# Patient Record
Sex: MALE | Race: WHITE | NOT HISPANIC OR LATINO | Employment: OTHER | URBAN - NONMETROPOLITAN AREA
[De-identification: names, ages, dates, MRNs, and addresses within clinical notes are randomized per-mention and may not be internally consistent; named-entity substitution may affect disease eponyms.]

---

## 2017-04-11 ENCOUNTER — OFFICE VISIT (OUTPATIENT)
Dept: UROLOGY | Facility: CLINIC | Age: 62
End: 2017-04-11

## 2017-04-11 VITALS
DIASTOLIC BLOOD PRESSURE: 74 MMHG | SYSTOLIC BLOOD PRESSURE: 110 MMHG | HEIGHT: 71 IN | BODY MASS INDEX: 34.69 KG/M2 | TEMPERATURE: 97.6 F | WEIGHT: 247.8 LBS

## 2017-04-11 DIAGNOSIS — N13.8 BPH (BENIGN PROSTATIC HYPERTROPHY) WITH URINARY OBSTRUCTION: ICD-10-CM

## 2017-04-11 DIAGNOSIS — N40.1 BPH (BENIGN PROSTATIC HYPERTROPHY) WITH URINARY OBSTRUCTION: ICD-10-CM

## 2017-04-11 DIAGNOSIS — E29.1 HYPOGONADISM, MALE: Primary | ICD-10-CM

## 2017-04-11 DIAGNOSIS — N52.9 ERECTILE DYSFUNCTION, UNSPECIFIED ERECTILE DYSFUNCTION TYPE: ICD-10-CM

## 2017-04-11 DIAGNOSIS — N52.9 IMPOTENCE DUE TO ERECTILE DYSFUNCTION: ICD-10-CM

## 2017-04-11 LAB
BILIRUB BLD-MCNC: NEGATIVE MG/DL
CLARITY, POC: CLEAR
COLOR UR: YELLOW
GLUCOSE UR STRIP-MCNC: NEGATIVE MG/DL
KETONES UR QL: NEGATIVE
LEUKOCYTE EST, POC: NEGATIVE
NITRITE UR-MCNC: NEGATIVE MG/ML
PH UR: 6.5 [PH] (ref 5–8)
PROT UR STRIP-MCNC: ABNORMAL MG/DL
RBC # UR STRIP: ABNORMAL /UL
SP GR UR: 1.03 (ref 1–1.03)
UROBILINOGEN UR QL: NORMAL

## 2017-04-11 PROCEDURE — 81003 URINALYSIS AUTO W/O SCOPE: CPT | Performed by: UROLOGY

## 2017-04-11 PROCEDURE — 99214 OFFICE O/P EST MOD 30 MIN: CPT | Performed by: UROLOGY

## 2017-04-11 RX ORDER — VARDENAFIL HYDROCHLORIDE 20 MG/1
20 TABLET ORAL DAILY PRN
Qty: 6 TABLET | Refills: 5 | Status: SHIPPED | OUTPATIENT
Start: 2017-04-11 | End: 2017-12-16 | Stop reason: SDUPTHER

## 2017-04-11 NOTE — PROGRESS NOTES
Subjective    Mr. Rees is 61 y.o. male    CHIEF COMPLAINT: Hypogonadism    The patient is doing reasonably well from a hypogonadism point of view has recent testosterone is right in the middle 668 he is using Axiron one swipe under each arm daily.    His hemoglobin and hematocrit although high normal are stable he is still donating blood as often as he is allowed to        We also following him for BPH clinically he is doing well his AUA score today is moderate he denies any gross hematuria there is no flank pain pain he does have little bit urgency frequency known infections etc. since we have seen him his PSA is normal in the 1.35 range he still takes tamsulosin he said he tried to stop it but he noticed immediately the results and so he is continuing to take    We are also following him for ED and Levitra seems to working well and went ahead and refill that for him today      History of Present Illness      The following portions of the patient's history were reviewed and updated as appropriate: allergies, current medications, past family history, past medical history, past social history, past surgical history and problem list.    Review of Systems   Constitutional: Negative.  Negative for chills and fever.   Gastrointestinal: Negative for abdominal distention, abdominal pain, anal bleeding, blood in stool and nausea.   Genitourinary: Positive for frequency. Negative for difficulty urinating, dysuria, flank pain, hematuria and urgency.   Psychiatric/Behavioral: Negative.  Negative for agitation and confusion.         Current Outpatient Prescriptions:   •  aspirin 81 MG EC tablet, Take 81 mg by mouth Daily., Disp: , Rfl:   •  AXIRON 30 MG/ACT solution, USE 1 PUMP TO EACH ARM DAILY, Disp: , Rfl: 5  •  CHANTIX CONTINUING MONTH HETAL 1 MG tablet, TAKE 1 TABLET BY MOUTH TWICE A DAY, Disp: , Rfl: 2  •  lisinopril-hydrochlorothiazide (PRINZIDE,ZESTORETIC) 20-12.5 MG per tablet, TAKE 1 TABLET BY MOUTH EVERY DAY, Disp: ,  "Rfl: 5  •  tamsulosin (FLOMAX) 0.4 MG capsule 24 hr capsule, TAKE ONE CAPSULE BY MOUTH EVERY DAY SHORTLY AFTER THE SAME MEAL, Disp: , Rfl: 3  •  vardenafil (LEVITRA) 20 MG tablet, Take 1 tablet by mouth Daily As Needed for erectile dysfunction., Disp: 6 tablet, Rfl: 5    Past Medical History:   Diagnosis Date   • Hypertension    • Nephrolithiasis        Past Surgical History:   Procedure Laterality Date   • BELOW KNEE LEG AMPUTATION     • LYMPH NODE BIOPSY         Social History     Social History   • Marital status:      Spouse name: N/A   • Number of children: N/A   • Years of education: N/A     Social History Main Topics   • Smoking status: Former Smoker   • Smokeless tobacco: None   • Alcohol use No   • Drug use: None   • Sexual activity: Not Asked     Other Topics Concern   • None     Social History Narrative       Family History   Problem Relation Age of Onset   • Kidney cancer Father    • No Known Problems Mother        Objective    /74  Temp 97.6 °F (36.4 °C)  Ht 71\" (180.3 cm)  Wt 247 lb 12.8 oz (112 kg)  BMI 34.56 kg/m2    Physical Exam      Results Encounter on 12/11/2016   Component Date Value Ref Range Status   • WBC 04/04/2017 5.65  4.80 - 10.80 10*3/mm3 Final   • RBC 04/04/2017 6.60* 4.80 - 5.90 10*6/mm3 Final   • Hemoglobin 04/04/2017 16.1  14.0 - 18.0 g/dL Final   • Hematocrit 04/04/2017 51.1  40.0 - 52.0 % Final   • MCV 04/04/2017 77.4* 82.0 - 95.0 fL Final   • MCH 04/04/2017 24.4* 28.0 - 32.0 pg Final   • MCHC 04/04/2017 31.5* 33.0 - 36.0 g/dL Final   • RDW 04/04/2017 15.1* 12.0 - 15.0 % Final   • Platelets 04/04/2017 297  130 - 400 10*3/mm3 Final   • Neutrophil Rel % 04/04/2017 49.7  39.0 - 78.0 % Final   • Lymphocyte Rel % 04/04/2017 35.6  15.0 - 45.0 % Final   • Monocyte Rel % 04/04/2017 11.9  4.0 - 12.0 % Final   • Eosinophil Rel % 04/04/2017 1.9  0.0 - 4.0 % Final   • Basophil Rel % 04/04/2017 0.7  0.0 - 2.0 % Final   • Neutrophils Absolute 04/04/2017 2.81  1.87 - 8.40 " 10*3/mm3 Final   • Lymphocytes Absolute 04/04/2017 2.01  0.72 - 4.86 10*3/mm3 Final   • Monocytes Absolute 04/04/2017 0.67  0.19 - 1.30 10*3/mm3 Final   • Eosinophils Absolute 04/04/2017 0.11  0.00 - 0.70 10*3/mm3 Final   • Basophils Absolute 04/04/2017 0.04  0.00 - 0.20 10*3/mm3 Final   • Immature Granulocyte Rel % 04/04/2017 0.2  0.0 - 5.0 % Final   • Immature Grans Absolute 04/04/2017 0.01  0.00 - 0.03 10*3/mm3 Final   • nRBC 04/04/2017 0.0  0.0 - 0.0 /100 WBC Final   • Glucose 04/04/2017 92  70 - 100 mg/dL Final   • BUN 04/04/2017 21  5 - 21 mg/dL Final   • Creatinine 04/04/2017 0.98  0.50 - 1.40 mg/dL Final   • eGFR Non African Am 04/04/2017 78  >60 mL/min/1.73 Final   • eGFR African Am 04/04/2017 95  >60 mL/min/1.73 Final   • BUN/Creatinine Ratio 04/04/2017 21.4  7.0 - 25.0 Final   • Sodium 04/04/2017 138  135 - 145 mmol/L Final   • Potassium 04/04/2017 4.7  3.5 - 5.3 mmol/L Final   • Chloride 04/04/2017 100  98 - 110 mmol/L Final   • Total CO2 04/04/2017 27.0  24.0 - 31.0 mmol/L Final   • Calcium 04/04/2017 9.2  8.4 - 10.4 mg/dL Final   • Total Protein 04/04/2017 7.2  6.3 - 8.7 g/dL Final   • Albumin 04/04/2017 4.20  3.50 - 5.00 g/dL Final   • Globulin 04/04/2017 3.0  gm/dL Final   • A/G Ratio 04/04/2017 1.4  1.1 - 2.5 g/dL Final   • Total Bilirubin 04/04/2017 0.5  0.1 - 1.0 mg/dL Final   • Alkaline Phosphatase 04/04/2017 48  24 - 120 U/L Final   • AST (SGOT) 04/04/2017 25  7 - 45 U/L Final   • ALT (SGPT) 04/04/2017 33  0 - 54 U/L Final   • PSA 04/04/2017 1.350  0.000 - 4.000 ng/mL Final   • Testosterone, Total 04/04/2017 668  348 - 1197 ng/dL Final   • Comment 04/04/2017 Comment   Final    Comment: Adult male reference interval is based on a population of lean males  up to 40 years old.         Results for orders placed or performed in visit on 04/11/17   POC Urinalysis Dipstick, Automated   Result Value Ref Range    Color Yellow Yellow, Straw, Dark Yellow, Linda    Clarity, UA Clear Clear    Glucose, UA  Negative Negative, 1000 mg/dL (3+) mg/dL    Bilirubin Negative Negative    Ketones, UA Negative Negative    Specific Gravity  1.030 1.005 - 1.030    Blood, UA Trace (A) Negative    pH, Urine 6.5 5.0 - 8.0    Protein, POC Trace (A) Negative mg/dL    Urobilinogen, UA Normal Normal    Leukocytes Negative Negative    Nitrite, UA Negative Negative       Assessment and Plan    Roc was seen today for hypogonadism.    Diagnoses and all orders for this visit:    Hypogonadism, male  -     POC Urinalysis Dipstick, Automated  -     CBC & Differential; Future  -     Comprehensive Metabolic Panel; Future  -     Testosterone; Future    BPH (benign prostatic hypertrophy) with urinary obstruction    Impotence due to erectile dysfunction    Erectile dysfunction, unspecified erectile dysfunction type  -     vardenafil (LEVITRA) 20 MG tablet; Take 1 tablet by mouth Daily As Needed for erectile dysfunction.   plan--from a hypogonadism point review he seems to be stable again we did discussed the elevated borderline high hemoglobin and hematocrit he does take aspirin daily and so he will continue giving units of blood as he has been.    I refilled his Axiron formed by written prescription today as well.    Also refilled his Levitra all seen back in 6 months repeat blood studies call sooner as needed    EMR Dragon/Transcription disclaimer:  Much of this encounter note is an electronic transcription/translation of spoken language to printed text. The electronic translation of spoken language may permit erroneous, or at times, nonsensical words or phrases to be inadvertently transcribed; although I have reviewed the note for such errors, some may still exist.

## 2017-04-16 ENCOUNTER — RESULTS ENCOUNTER (OUTPATIENT)
Dept: UROLOGY | Facility: CLINIC | Age: 62
End: 2017-04-16

## 2017-04-16 DIAGNOSIS — E29.1 HYPOGONADISM, MALE: ICD-10-CM

## 2017-06-12 ENCOUNTER — TELEPHONE (OUTPATIENT)
Dept: UROLOGY | Facility: CLINIC | Age: 62
End: 2017-06-12

## 2017-06-12 DIAGNOSIS — N40.1 BPH (BENIGN PROSTATIC HYPERTROPHY) WITH URINARY OBSTRUCTION: Primary | ICD-10-CM

## 2017-06-12 DIAGNOSIS — N13.8 BPH (BENIGN PROSTATIC HYPERTROPHY) WITH URINARY OBSTRUCTION: Primary | ICD-10-CM

## 2017-06-12 RX ORDER — TAMSULOSIN HYDROCHLORIDE 0.4 MG/1
1 CAPSULE ORAL DAILY
Qty: 30 CAPSULE | Refills: 3 | Status: SHIPPED | OUTPATIENT
Start: 2017-06-12 | End: 2017-10-10

## 2017-06-12 NOTE — TELEPHONE ENCOUNTER
Patient called for a medication refill on Flomax (Tamsulosin) 0.4 mg. He takes one a day. He wants it to go to [facility].

## 2017-10-05 LAB
ALBUMIN SERPL-MCNC: 4.4 G/DL (ref 3.5–5)
ALBUMIN/GLOB SERPL: 1.5 G/DL (ref 1.1–2.5)
ALP SERPL-CCNC: 52 U/L (ref 24–120)
ALT SERPL-CCNC: 31 U/L (ref 0–54)
AST SERPL-CCNC: 24 U/L (ref 7–45)
BASOPHILS # BLD AUTO: 0.06 10*3/MM3 (ref 0–0.2)
BASOPHILS NFR BLD AUTO: 1 % (ref 0–2)
BILIRUB SERPL-MCNC: 0.4 MG/DL (ref 0.1–1)
BUN SERPL-MCNC: 18 MG/DL (ref 5–21)
BUN/CREAT SERPL: 20.2 (ref 7–25)
CALCIUM SERPL-MCNC: 9.3 MG/DL (ref 8.4–10.4)
CHLORIDE SERPL-SCNC: 103 MMOL/L (ref 98–110)
CO2 SERPL-SCNC: 26 MMOL/L (ref 24–31)
CREAT SERPL-MCNC: 0.89 MG/DL (ref 0.5–1.4)
EOSINOPHIL # BLD AUTO: 0.16 10*3/MM3 (ref 0–0.7)
EOSINOPHIL NFR BLD AUTO: 2.8 % (ref 0–4)
ERYTHROCYTE [DISTWIDTH] IN BLOOD BY AUTOMATED COUNT: 14.9 % (ref 12–15)
GLOBULIN SER CALC-MCNC: 2.9 GM/DL
GLUCOSE SERPL-MCNC: 92 MG/DL (ref 70–100)
HCT VFR BLD AUTO: 50.7 % (ref 40–52)
HGB BLD-MCNC: 15.8 G/DL (ref 14–18)
IMM GRANULOCYTES # BLD: 0.02 10*3/MM3 (ref 0–0.03)
IMM GRANULOCYTES NFR BLD: 0.3 % (ref 0–5)
LYMPHOCYTES # BLD AUTO: 1.86 10*3/MM3 (ref 0.72–4.86)
LYMPHOCYTES NFR BLD AUTO: 32.1 % (ref 15–45)
MCH RBC QN AUTO: 24.6 PG (ref 28–32)
MCHC RBC AUTO-ENTMCNC: 31.2 G/DL (ref 33–36)
MCV RBC AUTO: 79.1 FL (ref 82–95)
MONOCYTES # BLD AUTO: 0.75 10*3/MM3 (ref 0.19–1.3)
MONOCYTES NFR BLD AUTO: 12.9 % (ref 4–12)
NEUTROPHILS # BLD AUTO: 2.95 10*3/MM3 (ref 1.87–8.4)
NEUTROPHILS NFR BLD AUTO: 50.9 % (ref 39–78)
PLATELET # BLD AUTO: 320 10*3/MM3 (ref 130–400)
POTASSIUM SERPL-SCNC: 4.6 MMOL/L (ref 3.5–5.3)
PROT SERPL-MCNC: 7.3 G/DL (ref 6.3–8.7)
RBC # BLD AUTO: 6.41 10*6/MM3 (ref 4.8–5.9)
SODIUM SERPL-SCNC: 140 MMOL/L (ref 135–145)
TESTOST SERPL-MCNC: 700 NG/DL (ref 264–916)
WBC # BLD AUTO: 5.8 10*3/MM3 (ref 4.8–10.8)

## 2017-10-17 ENCOUNTER — OFFICE VISIT (OUTPATIENT)
Dept: UROLOGY | Facility: CLINIC | Age: 62
End: 2017-10-17

## 2017-10-17 VITALS — BODY MASS INDEX: 36.14 KG/M2 | WEIGHT: 244 LBS | TEMPERATURE: 97.4 F | HEIGHT: 69 IN

## 2017-10-17 DIAGNOSIS — N52.9 IMPOTENCE DUE TO ERECTILE DYSFUNCTION: ICD-10-CM

## 2017-10-17 DIAGNOSIS — N40.1 BENIGN PROSTATIC HYPERPLASIA WITH LOWER URINARY TRACT SYMPTOMS, SYMPTOM DETAILS UNSPECIFIED: ICD-10-CM

## 2017-10-17 DIAGNOSIS — E29.1 HYPOGONADISM, MALE: Primary | ICD-10-CM

## 2017-10-17 LAB
BILIRUB BLD-MCNC: NEGATIVE MG/DL
CLARITY, POC: CLEAR
COLOR UR: YELLOW
GLUCOSE UR STRIP-MCNC: NEGATIVE MG/DL
KETONES UR QL: NEGATIVE
LEUKOCYTE EST, POC: NEGATIVE
NITRITE UR-MCNC: NEGATIVE MG/ML
PH UR: 6 [PH] (ref 5–8)
PROT UR STRIP-MCNC: NEGATIVE MG/DL
RBC # UR STRIP: ABNORMAL /UL
SP GR UR: 1.03 (ref 1–1.03)
UROBILINOGEN UR QL: NORMAL

## 2017-10-17 PROCEDURE — 99214 OFFICE O/P EST MOD 30 MIN: CPT | Performed by: UROLOGY

## 2017-10-17 PROCEDURE — 81003 URINALYSIS AUTO W/O SCOPE: CPT | Performed by: UROLOGY

## 2017-10-17 NOTE — PROGRESS NOTES
Subjective    Mr. Rees is 62 y.o. male    CHIEF COMPLAINT: Hypogonadism    The patient came back today for follow-up of hypogonadism he is been doing recently well he still takes Axiron daily on his testosterone still is right in the middle and consistent with what was last time at 700.    We again discussed a CBC his hemoglobin and hematocrit are borderline upper limits normal but he does give a unit of blood (much as frequently as he can.    The lab work appears to be normal he does feel better with the Axiron and wants to continue    He also has BPH as a way score today is 15 he did question other alternatives other than the Flomax which the patient is on I did discuss with him other options including the ureter left and gave him a brochure regarding this also laser and a TURP.  He does not have any gross hematuria no urinary tract infections no worsening really have the symptoms too much since we last saw    At this point he is not interested in pursuing this any further but will keep these things in mind    Patient also complains of ED he does take Levitra and he requested a refill for medication      History of Present Illness      The following portions of the patient's history were reviewed and updated as appropriate: allergies, current medications, past family history, past medical history, past social history, past surgical history and problem list.    Review of Systems   Constitutional: Negative.  Negative for chills and fever.   Gastrointestinal: Negative for abdominal distention, abdominal pain, anal bleeding, blood in stool and nausea.   Genitourinary: Positive for frequency and urgency. Negative for difficulty urinating, dysuria, flank pain and hematuria.   Psychiatric/Behavioral: Negative.  Negative for agitation and confusion.         Current Outpatient Prescriptions:   •  aspirin 81 MG EC tablet, Take 81 mg by mouth Daily., Disp: , Rfl:   •  AXIRON 30 MG/ACT solution, USE 1 PUMP TO EACH ARM DAILY,  "Disp: , Rfl: 5  •  CHANTIX CONTINUING MONTH HETAL 1 MG tablet, TAKE 1 TABLET BY MOUTH TWICE A DAY, Disp: , Rfl: 2  •  lisinopril-hydrochlorothiazide (PRINZIDE,ZESTORETIC) 20-12.5 MG per tablet, TAKE 1 TABLET BY MOUTH EVERY DAY, Disp: , Rfl: 5  •  vardenafil (LEVITRA) 20 MG tablet, Take 1 tablet by mouth Daily As Needed for erectile dysfunction., Disp: 6 tablet, Rfl: 5    Past Medical History:   Diagnosis Date   • Hypertension    • Nephrolithiasis        Past Surgical History:   Procedure Laterality Date   • BELOW KNEE LEG AMPUTATION     • LYMPH NODE BIOPSY         Social History     Social History   • Marital status:      Spouse name: N/A   • Number of children: N/A   • Years of education: N/A     Social History Main Topics   • Smoking status: Former Smoker   • Smokeless tobacco: Never Used   • Alcohol use No   • Drug use: None   • Sexual activity: Not Asked     Other Topics Concern   • None     Social History Narrative       Family History   Problem Relation Age of Onset   • Kidney cancer Father    • No Known Problems Mother        Objective    Temp 97.4 °F (36.3 °C)  Ht 69\" (175.3 cm)  Wt 244 lb (111 kg)  BMI 36.03 kg/m2    Physical Exam   Constitutional: He is oriented to person, place, and time. He appears well-developed and well-nourished.   Pulmonary/Chest: Effort normal.   Abdominal: Soft. He exhibits no distension and no mass. There is no tenderness. There is no rebound and no guarding. No hernia. Hernia confirmed negative in the right inguinal area and confirmed negative in the left inguinal area.   overweight   Genitourinary: Penis normal. Rectal exam shows no mass, no tenderness and anal tone normal. Enlarged: for the age of the patient. Right testis shows no mass, no swelling and no tenderness. Left testis shows no mass, no swelling and no tenderness. Circumcised. No hypospadias. No discharge found.   Genitourinary Comments:  The urethral meatus normal in position without evidence of stricture. " Epididymis without mass or tenderness. Vas Deferens is palpably normal.Anus and perineum without mass or tenderness. The prostate is approximately 30 ml. It is Symmetric, with a Soft consistency. There are no nodules present. . The seminal vesicles are Not palpable due to the size of the prostate    Testes atrophic.     Neurological: He is alert and oriented to person, place, and time.   Vitals reviewed.        Results Encounter on 04/16/2017   Component Date Value Ref Range Status   • WBC 10/04/2017 5.80  4.80 - 10.80 10*3/mm3 Final   • RBC 10/04/2017 6.41* 4.80 - 5.90 10*6/mm3 Final   • Hemoglobin 10/04/2017 15.8  14.0 - 18.0 g/dL Final   • Hematocrit 10/04/2017 50.7  40.0 - 52.0 % Final   • MCV 10/04/2017 79.1* 82.0 - 95.0 fL Final   • MCH 10/04/2017 24.6* 28.0 - 32.0 pg Final   • MCHC 10/04/2017 31.2* 33.0 - 36.0 g/dL Final   • RDW 10/04/2017 14.9  12.0 - 15.0 % Final   • Platelets 10/04/2017 320  130 - 400 10*3/mm3 Final   • Neutrophil Rel % 10/04/2017 50.9  39.0 - 78.0 % Final   • Lymphocyte Rel % 10/04/2017 32.1  15.0 - 45.0 % Final   • Monocyte Rel % 10/04/2017 12.9* 4.0 - 12.0 % Final   • Eosinophil Rel % 10/04/2017 2.8  0.0 - 4.0 % Final   • Basophil Rel % 10/04/2017 1.0  0.0 - 2.0 % Final   • Neutrophils Absolute 10/04/2017 2.95  1.87 - 8.40 10*3/mm3 Final   • Lymphocytes Absolute 10/04/2017 1.86  0.72 - 4.86 10*3/mm3 Final   • Monocytes Absolute 10/04/2017 0.75  0.19 - 1.30 10*3/mm3 Final   • Eosinophils Absolute 10/04/2017 0.16  0.00 - 0.70 10*3/mm3 Final   • Basophils Absolute 10/04/2017 0.06  0.00 - 0.20 10*3/mm3 Final   • Immature Granulocyte Rel % 10/04/2017 0.3  0.0 - 5.0 % Final   • Immature Grans Absolute 10/04/2017 0.02  0.00 - 0.03 10*3/mm3 Final   • Glucose 10/04/2017 92  70 - 100 mg/dL Final   • BUN 10/04/2017 18  5 - 21 mg/dL Final   • Creatinine 10/04/2017 0.89  0.50 - 1.40 mg/dL Final   • eGFR Non  Am 10/04/2017 87  >60 mL/min/1.73 Final   • eGFR African Am 10/04/2017 105  >60  mL/min/1.73 Final   • BUN/Creatinine Ratio 10/04/2017 20.2  7.0 - 25.0 Final   • Sodium 10/04/2017 140  135 - 145 mmol/L Final   • Potassium 10/04/2017 4.6  3.5 - 5.3 mmol/L Final   • Chloride 10/04/2017 103  98 - 110 mmol/L Final   • Total CO2 10/04/2017 26.0  24.0 - 31.0 mmol/L Final   • Calcium 10/04/2017 9.3  8.4 - 10.4 mg/dL Final   • Total Protein 10/04/2017 7.3  6.3 - 8.7 g/dL Final   • Albumin 10/04/2017 4.40  3.50 - 5.00 g/dL Final   • Globulin 10/04/2017 2.9  gm/dL Final   • A/G Ratio 10/04/2017 1.5  1.1 - 2.5 g/dL Final   • Total Bilirubin 10/04/2017 0.4  0.1 - 1.0 mg/dL Final   • Alkaline Phosphatase 10/04/2017 52  24 - 120 U/L Final   • AST (SGOT) 10/04/2017 24  7 - 45 U/L Final   • ALT (SGPT) 10/04/2017 31  0 - 54 U/L Final   • Testosterone, Total 10/04/2017 700  264 - 916 ng/dL Final    Comment: Adult male reference interval is based on a population of  healthy nonobese males (BMI <30) between 19 and 39 years old.  chaparro Leon.al. JCEM 2017,102;8448-7566. PMID: 95216771.         Results for orders placed or performed in visit on 10/17/17   POC Urinalysis Dipstick, Automated   Result Value Ref Range    Color Yellow Yellow, Straw, Dark Yellow, Linda    Clarity, UA Clear Clear    Glucose, UA Negative Negative, 1000 mg/dL (3+) mg/dL    Bilirubin Negative Negative    Ketones, UA Negative Negative    Specific Gravity  1.030 1.005 - 1.030    Blood, UA Trace (A) Negative    pH, Urine 6.0 5.0 - 8.0    Protein, POC Negative Negative mg/dL    Urobilinogen, UA Normal Normal    Leukocytes Negative Negative    Nitrite, UA Negative Negative       Assessment and Plan    Roc was seen today for hypogandism, in male.    Diagnoses and all orders for this visit:    Hypogonadism, male  -     POC Urinalysis Dipstick, Automated  -     PSA; Future  -     Testosterone; Future  -     CBC & Differential; Future  -     Comprehensive Metabolic Panel; Future    Impotence due to erectile dysfunction    Benign prostatic  hyperplasia with lower urinary tract symptoms, symptom details unspecified  -     PSA; Future    Plan--from a BPH point review the patient is doing recently well again I discussed alternative management gave him information regarding your left he will call if he like to pursue this further    I also discussed with him the testosterone and he would like to continue the actual on he still has a couple refills on this prescription so he will call when it starts to run out    We also discussed ED I did refill his Levitra 20 mg form gave him 10 with 5 refills    We will seen back in 6 months again with a PSA and blood work has a difficulties prior then he will call

## 2017-10-22 ENCOUNTER — RESULTS ENCOUNTER (OUTPATIENT)
Dept: UROLOGY | Facility: CLINIC | Age: 62
End: 2017-10-22

## 2017-10-22 DIAGNOSIS — N40.1 BENIGN PROSTATIC HYPERPLASIA WITH LOWER URINARY TRACT SYMPTOMS, SYMPTOM DETAILS UNSPECIFIED: ICD-10-CM

## 2017-10-22 DIAGNOSIS — E29.1 HYPOGONADISM, MALE: ICD-10-CM

## 2017-11-13 ENCOUNTER — TRANSCRIBE ORDERS (OUTPATIENT)
Dept: GENERAL RADIOLOGY | Facility: HOSPITAL | Age: 62
End: 2017-11-13

## 2017-11-13 ENCOUNTER — HOSPITAL ENCOUNTER (OUTPATIENT)
Dept: GENERAL RADIOLOGY | Facility: HOSPITAL | Age: 62
Discharge: HOME | End: 2017-11-13
Attending: INTERNAL MEDICINE | Admitting: INTERNAL MEDICINE

## 2017-11-13 DIAGNOSIS — J20.9 ACUTE BRONCHITIS, UNSPECIFIED ORGANISM: Primary | ICD-10-CM

## 2017-11-13 PROCEDURE — 71020 HC CHEST PA AND LATERAL: CPT

## 2017-12-01 ENCOUNTER — TELEPHONE (OUTPATIENT)
Dept: UROLOGY | Facility: CLINIC | Age: 62
End: 2017-12-01

## 2017-12-01 DIAGNOSIS — N40.1 BENIGN PROSTATIC HYPERPLASIA WITH LOWER URINARY TRACT SYMPTOMS, SYMPTOM DETAILS UNSPECIFIED: Primary | ICD-10-CM

## 2017-12-01 NOTE — TELEPHONE ENCOUNTER
----- Message from Ingrid Vieyra MA sent at 12/1/2017  8:50 AM CST -----  Tried escribing, could not I verablly called this in to [facility] 12/1/17 8:51  ----- Message -----     From: Sweta Cordova CMA     Sent: 12/1/2017   8:39 AM       To: Ingrid Vieyra MA    Patient would like axiron refilled.

## 2017-12-16 DIAGNOSIS — N52.9 ERECTILE DYSFUNCTION, UNSPECIFIED ERECTILE DYSFUNCTION TYPE: ICD-10-CM

## 2017-12-18 RX ORDER — VARDENAFIL HCL 20 MG
TABLET ORAL
Qty: 18 TABLET | Refills: 4 | Status: SHIPPED | OUTPATIENT
Start: 2017-12-18 | End: 2018-04-17 | Stop reason: SDUPTHER

## 2018-03-12 ENCOUNTER — HOSPITAL ENCOUNTER (OUTPATIENT)
Dept: GENERAL RADIOLOGY | Facility: HOSPITAL | Age: 63
Discharge: HOME | End: 2018-03-12
Admitting: NURSE PRACTITIONER

## 2018-03-12 ENCOUNTER — TRANSCRIBE ORDERS (OUTPATIENT)
Dept: GENERAL RADIOLOGY | Facility: HOSPITAL | Age: 63
End: 2018-03-12

## 2018-03-12 DIAGNOSIS — M53.3 COCCYX PAIN: Primary | ICD-10-CM

## 2018-03-12 PROCEDURE — 72220 X-RAY EXAM SACRUM TAILBONE: CPT

## 2018-04-11 LAB
ALBUMIN SERPL-MCNC: 4.3 G/DL (ref 3.5–5)
ALBUMIN/GLOB SERPL: 1.5 G/DL (ref 1.1–2.5)
ALP SERPL-CCNC: 51 U/L (ref 24–120)
ALT SERPL-CCNC: 34 U/L (ref 0–54)
AST SERPL-CCNC: 25 U/L (ref 7–45)
BASOPHILS # BLD AUTO: 0.05 10*3/MM3 (ref 0–0.2)
BASOPHILS NFR BLD AUTO: 1 % (ref 0–2)
BILIRUB SERPL-MCNC: 0.3 MG/DL (ref 0.1–1)
BUN SERPL-MCNC: 20 MG/DL (ref 5–21)
BUN/CREAT SERPL: 22 (ref 7–25)
CALCIUM SERPL-MCNC: 9.5 MG/DL (ref 8.4–10.4)
CHLORIDE SERPL-SCNC: 100 MMOL/L (ref 98–110)
CO2 SERPL-SCNC: 28 MMOL/L (ref 24–31)
CREAT SERPL-MCNC: 0.91 MG/DL (ref 0.5–1.4)
EOSINOPHIL # BLD AUTO: 0.09 10*3/MM3 (ref 0–0.7)
EOSINOPHIL NFR BLD AUTO: 1.9 % (ref 0–4)
ERYTHROCYTE [DISTWIDTH] IN BLOOD BY AUTOMATED COUNT: 14.8 % (ref 12–15)
GFR SERPLBLD CREATININE-BSD FMLA CKD-EPI: 103 ML/MIN/1.73
GFR SERPLBLD CREATININE-BSD FMLA CKD-EPI: 85 ML/MIN/1.73
GLOBULIN SER CALC-MCNC: 2.9 GM/DL
GLUCOSE SERPL-MCNC: 75 MG/DL (ref 70–100)
HCT VFR BLD AUTO: 53 % (ref 40–52)
HGB BLD-MCNC: 16.2 G/DL (ref 14–18)
IMM GRANULOCYTES # BLD: 0.01 10*3/MM3 (ref 0–0.03)
IMM GRANULOCYTES NFR BLD: 0.2 % (ref 0–5)
LYMPHOCYTES # BLD AUTO: 1.79 10*3/MM3 (ref 0.72–4.86)
LYMPHOCYTES NFR BLD AUTO: 37.1 % (ref 15–45)
MCH RBC QN AUTO: 24.7 PG (ref 28–32)
MCHC RBC AUTO-ENTMCNC: 30.6 G/DL (ref 33–36)
MCV RBC AUTO: 80.9 FL (ref 82–95)
MONOCYTES # BLD AUTO: 0.55 10*3/MM3 (ref 0.19–1.3)
MONOCYTES NFR BLD AUTO: 11.4 % (ref 4–12)
NEUTROPHILS # BLD AUTO: 2.34 10*3/MM3 (ref 1.87–8.4)
NEUTROPHILS NFR BLD AUTO: 48.4 % (ref 39–78)
NRBC BLD AUTO-RTO: 0 /100 WBC (ref 0–0)
PLATELET # BLD AUTO: 347 10*3/MM3 (ref 130–400)
POTASSIUM SERPL-SCNC: 4.2 MMOL/L (ref 3.5–5.3)
PROT SERPL-MCNC: 7.2 G/DL (ref 6.3–8.7)
PSA SERPL-MCNC: 1.23 NG/ML (ref 0–4)
RBC # BLD AUTO: 6.55 10*6/MM3 (ref 4.8–5.9)
SODIUM SERPL-SCNC: 140 MMOL/L (ref 135–145)
TESTOST SERPL-MCNC: 551 NG/DL (ref 264–916)
WBC # BLD AUTO: 4.83 10*3/MM3 (ref 4.8–10.8)

## 2018-04-17 ENCOUNTER — OFFICE VISIT (OUTPATIENT)
Dept: UROLOGY | Facility: CLINIC | Age: 63
End: 2018-04-17

## 2018-04-17 VITALS
HEIGHT: 71 IN | WEIGHT: 246.4 LBS | DIASTOLIC BLOOD PRESSURE: 80 MMHG | TEMPERATURE: 98 F | BODY MASS INDEX: 34.5 KG/M2 | SYSTOLIC BLOOD PRESSURE: 138 MMHG

## 2018-04-17 DIAGNOSIS — E29.1 HYPOGONADISM, MALE: Primary | ICD-10-CM

## 2018-04-17 DIAGNOSIS — N52.9 ERECTILE DYSFUNCTION, UNSPECIFIED ERECTILE DYSFUNCTION TYPE: ICD-10-CM

## 2018-04-17 DIAGNOSIS — N40.1 BENIGN PROSTATIC HYPERPLASIA WITH LOWER URINARY TRACT SYMPTOMS, SYMPTOM DETAILS UNSPECIFIED: ICD-10-CM

## 2018-04-17 LAB
BILIRUB BLD-MCNC: NEGATIVE MG/DL
CLARITY, POC: CLEAR
COLOR UR: YELLOW
GLUCOSE UR STRIP-MCNC: NEGATIVE MG/DL
KETONES UR QL: NEGATIVE
LEUKOCYTE EST, POC: NEGATIVE
NITRITE UR-MCNC: NEGATIVE MG/ML
PH UR: 6 [PH] (ref 5–8)
PROT UR STRIP-MCNC: ABNORMAL MG/DL
RBC # UR STRIP: ABNORMAL /UL
SP GR UR: 1.02 (ref 1–1.03)
UROBILINOGEN UR QL: NORMAL

## 2018-04-17 PROCEDURE — 81001 URINALYSIS AUTO W/SCOPE: CPT | Performed by: UROLOGY

## 2018-04-17 PROCEDURE — 99213 OFFICE O/P EST LOW 20 MIN: CPT | Performed by: UROLOGY

## 2018-04-17 RX ORDER — VARDENAFIL HYDROCHLORIDE 20 MG/1
20 TABLET ORAL SEE ADMIN INSTRUCTIONS
Qty: 18 TABLET | Refills: 4 | Status: SHIPPED | OUTPATIENT
Start: 2018-04-17 | End: 2018-12-24 | Stop reason: SDUPTHER

## 2018-04-17 RX ORDER — TAMSULOSIN HYDROCHLORIDE 0.4 MG/1
1 CAPSULE ORAL DAILY
Qty: 90 CAPSULE | Refills: 5 | Status: SHIPPED | OUTPATIENT
Start: 2018-04-17 | End: 2019-04-16 | Stop reason: SDUPTHER

## 2018-04-17 NOTE — PROGRESS NOTES
Subjective    Mr. Rees is 62 y.o. male    CHIEF COMPLAINT: Hypogonadism    The patient comes back for follow-up of hypogonadism clinically he is doing well I he denies any significant side effects from medications I did discuss his hemoglobin and hematocrit with him which is minimally elevated and actually he is giving a unit of blood whenever he cancer recommend he continue to do so.    I has most recent testosterone was 550.    He also has a history of BPH as a way score today is moderate he denies any gross hematuria little bit urgency on occasion no urinary tract infections he does take Flomax on a daily basis which I refilled for him.  His PSA is normal    He also has ED and well with Levitra and the testosterone I refilled Levitra as well.          History of Present Illness      The following portions of the patient's history were reviewed and updated as appropriate: allergies, current medications, past family history, past medical history, past social history, past surgical history and problem list.    Review of Systems   Constitutional: Negative.  Negative for chills and fever.   Gastrointestinal: Negative for abdominal distention, abdominal pain, anal bleeding, blood in stool and nausea.   Genitourinary: Positive for urgency. Negative for difficulty urinating, dysuria, flank pain, frequency and hematuria.   Psychiatric/Behavioral: Negative.  Negative for agitation and confusion.         Current Outpatient Prescriptions:   •  aspirin 81 MG EC tablet, Take 81 mg by mouth Daily., Disp: , Rfl:   •  AXIRON 30 MG/ACT solution, Use 1 pump under each arm daily, Disp: 90 mL, Rfl: 5  •  CHANTIX CONTINUING MONTH HETAL 1 MG tablet, TAKE 1 TABLET BY MOUTH TWICE A DAY, Disp: , Rfl: 2  •  lisinopril-hydrochlorothiazide (PRINZIDE,ZESTORETIC) 20-12.5 MG per tablet, TAKE 1 TABLET BY MOUTH EVERY DAY, Disp: , Rfl: 5  •  tamsulosin (FLOMAX) 0.4 MG capsule 24 hr capsule, Take 1 capsule by mouth Daily., Disp: 90 capsule, Rfl: 5  •   "vardenafil (LEVITRA) 20 MG tablet, Take 1 tablet by mouth See Admin Instructions., Disp: 18 tablet, Rfl: 4    Past Medical History:   Diagnosis Date   • Hypertension    • Nephrolithiasis        Past Surgical History:   Procedure Laterality Date   • BELOW KNEE LEG AMPUTATION     • LYMPH NODE BIOPSY         Social History     Social History   • Marital status:      Social History Main Topics   • Smoking status: Former Smoker   • Smokeless tobacco: Never Used   • Alcohol use No   • Drug use: Unknown     Other Topics Concern   • Not on file       Family History   Problem Relation Age of Onset   • Kidney cancer Father    • No Known Problems Mother        Objective    /80   Temp 98 °F (36.7 °C)   Ht 180.3 cm (71\")   Wt 112 kg (246 lb 6.4 oz)   BMI 34.37 kg/m²     Physical Exam      Results Encounter on 10/22/2017   Component Date Value Ref Range Status   • PSA 04/11/2018 1.230  0.000 - 4.000 ng/mL Final   • Testosterone, Total 04/11/2018 551  264 - 916 ng/dL Final    Comment: Adult male reference interval is based on a population of  healthy nonobese males (BMI <30) between 19 and 39 years old.  Edward et.al. JCEM 2017,102;1735-7215. PMID: [id number].     • WBC 04/11/2018 4.83  4.80 - 10.80 10*3/mm3 Final   • RBC 04/11/2018 6.55* 4.80 - 5.90 10*6/mm3 Final   • Hemoglobin 04/11/2018 16.2  14.0 - 18.0 g/dL Final   • Hematocrit 04/11/2018 53.0* 40.0 - 52.0 % Final   • MCV 04/11/2018 80.9* 82.0 - 95.0 fL Final   • MCH 04/11/2018 24.7* 28.0 - 32.0 pg Final   • MCHC 04/11/2018 30.6* 33.0 - 36.0 g/dL Final   • RDW 04/11/2018 14.8  12.0 - 15.0 % Final   • Platelets 04/11/2018 347  130 - 400 10*3/mm3 Final   • Neutrophil Rel % 04/11/2018 48.4  39.0 - 78.0 % Final   • Lymphocyte Rel % 04/11/2018 37.1  15.0 - 45.0 % Final   • Monocyte Rel % 04/11/2018 11.4  4.0 - 12.0 % Final   • Eosinophil Rel % 04/11/2018 1.9  0.0 - 4.0 % Final   • Basophil Rel % 04/11/2018 1.0  0.0 - 2.0 % Final   • Neutrophils Absolute " 04/11/2018 2.34  1.87 - 8.40 10*3/mm3 Final   • Lymphocytes Absolute 04/11/2018 1.79  0.72 - 4.86 10*3/mm3 Final   • Monocytes Absolute 04/11/2018 0.55  0.19 - 1.30 10*3/mm3 Final   • Eosinophils Absolute 04/11/2018 0.09  0.00 - 0.70 10*3/mm3 Final   • Basophils Absolute 04/11/2018 0.05  0.00 - 0.20 10*3/mm3 Final   • Immature Granulocyte Rel % 04/11/2018 0.2  0.0 - 5.0 % Final   • Immature Grans Absolute 04/11/2018 0.01  0.00 - 0.03 10*3/mm3 Final   • nRBC 04/11/2018 0.0  0.0 - 0.0 /100 WBC Final   • Glucose 04/11/2018 75  70 - 100 mg/dL Final   • BUN 04/11/2018 20  5 - 21 mg/dL Final   • Creatinine 04/11/2018 0.91  0.50 - 1.40 mg/dL Final   • eGFR Non African Am 04/11/2018 85  >60 mL/min/1.73 Final   • eGFR African Am 04/11/2018 103  >60 mL/min/1.73 Final   • BUN/Creatinine Ratio 04/11/2018 22.0  7.0 - 25.0 Final   • Sodium 04/11/2018 140  135 - 145 mmol/L Final   • Potassium 04/11/2018 4.2  3.5 - 5.3 mmol/L Final   • Chloride 04/11/2018 100  98 - 110 mmol/L Final   • Total CO2 04/11/2018 28.0  24.0 - 31.0 mmol/L Final   • Calcium 04/11/2018 9.5  8.4 - 10.4 mg/dL Final   • Total Protein 04/11/2018 7.2  6.3 - 8.7 g/dL Final   • Albumin 04/11/2018 4.30  3.50 - 5.00 g/dL Final   • Globulin 04/11/2018 2.9  gm/dL Final   • A/G Ratio 04/11/2018 1.5  1.1 - 2.5 g/dL Final   • Total Bilirubin 04/11/2018 0.3  0.1 - 1.0 mg/dL Final   • Alkaline Phosphatase 04/11/2018 51  24 - 120 U/L Final   • AST (SGOT) 04/11/2018 25  7 - 45 U/L Final   • ALT (SGPT) 04/11/2018 34  0 - 54 U/L Final       Results for orders placed or performed in visit on 04/17/18   POC Urinalysis Dipstick, Automated   Result Value Ref Range    Color Yellow Yellow, Straw, Dark Yellow, Linda    Clarity, UA Clear Clear    Glucose, UA Negative Negative, 1000 mg/dL (3+) mg/dL    Bilirubin Negative Negative    Ketones, UA Negative Negative    Specific Gravity  1.025 1.005 - 1.030    Blood, UA Moderate (A) Negative    pH, Urine 6.0 5.0 - 8.0    Protein, POC Trace  (A) Negative mg/dL    Urobilinogen, UA Normal Normal    Leukocytes Negative Negative    Nitrite, UA Negative Negative       Assessment and Plan    Diagnoses and all orders for this visit:    Hypogonadism, male  -     POC Urinalysis Dipstick, Automated  -     CBC & Differential; Future  -     Comprehensive Metabolic Panel; Future  -     Testosterone; Future    Erectile dysfunction, unspecified erectile dysfunction type  -     vardenafil (LEVITRA) 20 MG tablet; Take 1 tablet by mouth See Admin Instructions.    Benign prostatic hyperplasia with lower urinary tract symptoms, symptom details unspecified  -     tamsulosin (FLOMAX) 0.4 MG capsule 24 hr capsule; Take 1 capsule by mouth Daily.    Plan--we will continue the actual wrong which I refilled for him will seen back again in 6 months with repeat blood work he will continue to give blood and can take aspirin    From a BPH point review he is doing well seen back again in 66 months he does not need a PSA at that time    From ED point review I refilled

## 2018-04-17 NOTE — PATIENT INSTRUCTIONS

## 2018-04-22 ENCOUNTER — RESULTS ENCOUNTER (OUTPATIENT)
Dept: UROLOGY | Facility: CLINIC | Age: 63
End: 2018-04-22

## 2018-04-22 DIAGNOSIS — E29.1 HYPOGONADISM, MALE: ICD-10-CM

## 2018-06-07 ENCOUNTER — HOSPITAL ENCOUNTER (OUTPATIENT)
Dept: ULTRASOUND IMAGING | Facility: HOSPITAL | Age: 63
Discharge: HOME | End: 2018-06-07
Attending: EMERGENCY MEDICINE | Admitting: EMERGENCY MEDICINE

## 2018-06-07 ENCOUNTER — TRANSCRIBE ORDERS (OUTPATIENT)
Dept: LAB | Facility: HOSPITAL | Age: 63
End: 2018-06-07

## 2018-06-07 DIAGNOSIS — M79.605 LEFT LEG PAIN: ICD-10-CM

## 2018-06-07 DIAGNOSIS — M79.605 LEFT LEG PAIN: Primary | ICD-10-CM

## 2018-06-07 PROCEDURE — 93971 EXTREMITY STUDY: CPT

## 2018-10-10 LAB
ALBUMIN SERPL-MCNC: 4.2 G/DL (ref 3.5–5)
ALBUMIN/GLOB SERPL: 1.5 G/DL (ref 1.1–2.5)
ALP SERPL-CCNC: 48 U/L (ref 24–120)
ALT SERPL-CCNC: 37 U/L (ref 0–54)
AST SERPL-CCNC: 33 U/L (ref 7–45)
BASOPHILS # BLD AUTO: 0.06 10*3/MM3 (ref 0–0.2)
BASOPHILS NFR BLD AUTO: 1.2 % (ref 0–2)
BILIRUB SERPL-MCNC: 0.5 MG/DL (ref 0.1–1)
BUN SERPL-MCNC: 20 MG/DL (ref 5–21)
BUN/CREAT SERPL: 20.8 (ref 7–25)
CALCIUM SERPL-MCNC: 9.1 MG/DL (ref 8.4–10.4)
CHLORIDE SERPL-SCNC: 101 MMOL/L (ref 98–110)
CO2 SERPL-SCNC: 28 MMOL/L (ref 24–31)
CREAT SERPL-MCNC: 0.96 MG/DL (ref 0.5–1.4)
EOSINOPHIL # BLD AUTO: 0.15 10*3/MM3 (ref 0–0.7)
EOSINOPHIL NFR BLD AUTO: 2.9 % (ref 0–4)
ERYTHROCYTE [DISTWIDTH] IN BLOOD BY AUTOMATED COUNT: 14.9 % (ref 12–15)
GLOBULIN SER CALC-MCNC: 2.8 GM/DL
GLUCOSE SERPL-MCNC: 95 MG/DL (ref 70–100)
HCT VFR BLD AUTO: 52.8 % (ref 40–52)
HGB BLD-MCNC: 15.8 G/DL (ref 14–18)
IMM GRANULOCYTES # BLD: 0.01 10*3/MM3 (ref 0–0.03)
IMM GRANULOCYTES NFR BLD: 0.2 % (ref 0–5)
LYMPHOCYTES # BLD AUTO: 1.96 10*3/MM3 (ref 0.72–4.86)
LYMPHOCYTES NFR BLD AUTO: 38.1 % (ref 15–45)
MCH RBC QN AUTO: 23.9 PG (ref 28–32)
MCHC RBC AUTO-ENTMCNC: 29.9 G/DL (ref 33–36)
MCV RBC AUTO: 79.8 FL (ref 82–95)
MONOCYTES # BLD AUTO: 0.53 10*3/MM3 (ref 0.19–1.3)
MONOCYTES NFR BLD AUTO: 10.3 % (ref 4–12)
NEUTROPHILS # BLD AUTO: 2.43 10*3/MM3 (ref 1.87–8.4)
NEUTROPHILS NFR BLD AUTO: 47.3 % (ref 39–78)
NRBC BLD AUTO-RTO: 0 /100 WBC (ref 0–0)
PLATELET # BLD AUTO: 336 10*3/MM3 (ref 130–400)
POTASSIUM SERPL-SCNC: 4.5 MMOL/L (ref 3.5–5.3)
PROT SERPL-MCNC: 7 G/DL (ref 6.3–8.7)
RBC # BLD AUTO: 6.62 10*6/MM3 (ref 4.8–5.9)
SODIUM SERPL-SCNC: 141 MMOL/L (ref 135–145)
TESTOST SERPL-MCNC: 919 NG/DL (ref 264–916)
WBC # BLD AUTO: 5.14 10*3/MM3 (ref 4.8–10.8)

## 2018-10-16 ENCOUNTER — OFFICE VISIT (OUTPATIENT)
Dept: UROLOGY | Facility: CLINIC | Age: 63
End: 2018-10-16

## 2018-10-16 VITALS
DIASTOLIC BLOOD PRESSURE: 78 MMHG | HEIGHT: 71 IN | WEIGHT: 245 LBS | BODY MASS INDEX: 34.3 KG/M2 | TEMPERATURE: 98.1 F | SYSTOLIC BLOOD PRESSURE: 138 MMHG

## 2018-10-16 DIAGNOSIS — N40.1 BENIGN PROSTATIC HYPERPLASIA WITH LOWER URINARY TRACT SYMPTOMS, SYMPTOM DETAILS UNSPECIFIED: ICD-10-CM

## 2018-10-16 DIAGNOSIS — N52.9 ERECTILE DYSFUNCTION, UNSPECIFIED ERECTILE DYSFUNCTION TYPE: ICD-10-CM

## 2018-10-16 DIAGNOSIS — E29.1 HYPOGONADISM, MALE: Primary | ICD-10-CM

## 2018-10-16 LAB
BILIRUB BLD-MCNC: NEGATIVE MG/DL
CLARITY, POC: CLEAR
COLOR UR: YELLOW
GLUCOSE UR STRIP-MCNC: NEGATIVE MG/DL
KETONES UR QL: NEGATIVE
LEUKOCYTE EST, POC: NEGATIVE
NITRITE UR-MCNC: NEGATIVE MG/ML
PH UR: 6 [PH] (ref 5–8)
PROT UR STRIP-MCNC: ABNORMAL MG/DL
RBC # UR STRIP: ABNORMAL /UL
SP GR UR: 1.03 (ref 1–1.03)
UROBILINOGEN UR QL: NORMAL

## 2018-10-16 PROCEDURE — 81001 URINALYSIS AUTO W/SCOPE: CPT | Performed by: UROLOGY

## 2018-10-16 PROCEDURE — 99213 OFFICE O/P EST LOW 20 MIN: CPT | Performed by: UROLOGY

## 2018-10-16 RX ORDER — LOSARTAN POTASSIUM 25 MG/1
25 TABLET ORAL DAILY
COMMUNITY
End: 2019-01-17

## 2018-10-16 NOTE — PROGRESS NOTES
Subjective    Mr. Rees is 63 y.o. male    CHIEF COMPLAINT: Hypogonadi    Patient is back today for follow-up of hypogonadism clinically he is doing reasonably well he feels like the medication is working well interestingly enough his blood work shows now but it is testosterones 919 fact he was starting to say that he really did not think he got enough on every time I told him that certainly by this blood test is higher than has been previously his hemoglobin is normal his hematocrit still slightly high but less than was previously again he feels as if it is working okay symptomatically.    He also has rather significant BPH as a way score today is 20 he is not had any gross materia no flank pain and recurrent infections his urinalysis is clear.    He is on Flomax daily I did discuss with him possibly increasing up to twice a day but he wants to discontinue at this rate at this point in time      History of Present Illness      The following portions of the patient's history were reviewed and updated as appropriate: allergies, current medications, past family history, past medical history, past social history, past surgical history and problem list.    Review of Systems   Constitutional: Negative.  Negative for chills and fever.   Gastrointestinal: Negative for abdominal distention, abdominal pain, anal bleeding, blood in stool and nausea.   Genitourinary: Negative for difficulty urinating, dysuria, flank pain, frequency, hematuria and urgency.   Psychiatric/Behavioral: Negative.  Negative for agitation and confusion.         Current Outpatient Prescriptions:   •  aspirin 81 MG EC tablet, Take 81 mg by mouth Daily., Disp: , Rfl:   •  AXIRON 30 MG/ACT solution, Use 1 pump under each arm daily, Disp: 90 mL, Rfl: 5  •  CHANTIX CONTINUING MONTH HETAL 1 MG tablet, TAKE 1 TABLET BY MOUTH TWICE A DAY, Disp: , Rfl: 2  •  losartan (COZAAR) 25 MG tablet, Take 25 mg by mouth Daily., Disp: , Rfl:   •  tamsulosin (FLOMAX) 0.4 MG  "capsule 24 hr capsule, Take 1 capsule by mouth Daily., Disp: 90 capsule, Rfl: 5  •  vardenafil (LEVITRA) 20 MG tablet, Take 1 tablet by mouth See Admin Instructions., Disp: 18 tablet, Rfl: 4  •  lisinopril-hydrochlorothiazide (PRINZIDE,ZESTORETIC) 20-12.5 MG per tablet, TAKE 1 TABLET BY MOUTH EVERY DAY, Disp: , Rfl: 5    Past Medical History:   Diagnosis Date   • Hypertension    • Nephrolithiasis        Past Surgical History:   Procedure Laterality Date   • BELOW KNEE LEG AMPUTATION     • LYMPH NODE BIOPSY         Social History     Social History   • Marital status:      Social History Main Topics   • Smoking status: Former Smoker   • Smokeless tobacco: Never Used   • Alcohol use No   • Drug use: Unknown     Other Topics Concern   • Not on file       Family History   Problem Relation Age of Onset   • Kidney cancer Father    • No Known Problems Mother        Objective    /78   Temp 98.1 °F (36.7 °C)   Ht 180.3 cm (71\")   Wt 111 kg (245 lb)   BMI 34.17 kg/m²     Physical Exam      Results Encounter on 04/22/2018   Component Date Value Ref Range Status   • WBC 10/09/2018 5.14  4.80 - 10.80 10*3/mm3 Final   • RBC 10/09/2018 6.62* 4.80 - 5.90 10*6/mm3 Final   • Hemoglobin 10/09/2018 15.8  14.0 - 18.0 g/dL Final   • Hematocrit 10/09/2018 52.8* 40.0 - 52.0 % Final   • MCV 10/09/2018 79.8* 82.0 - 95.0 fL Final   • MCH 10/09/2018 23.9* 28.0 - 32.0 pg Final   • MCHC 10/09/2018 29.9* 33.0 - 36.0 g/dL Final   • RDW 10/09/2018 14.9  12.0 - 15.0 % Final   • Platelets 10/09/2018 336  130 - 400 10*3/mm3 Final   • Neutrophil Rel % 10/09/2018 47.3  39.0 - 78.0 % Final   • Lymphocyte Rel % 10/09/2018 38.1  15.0 - 45.0 % Final   • Monocyte Rel % 10/09/2018 10.3  4.0 - 12.0 % Final   • Eosinophil Rel % 10/09/2018 2.9  0.0 - 4.0 % Final   • Basophil Rel % 10/09/2018 1.2  0.0 - 2.0 % Final   • Neutrophils Absolute 10/09/2018 2.43  1.87 - 8.40 10*3/mm3 Final   • Lymphocytes Absolute 10/09/2018 1.96  0.72 - 4.86 10*3/mm3 " Final   • Monocytes Absolute 10/09/2018 0.53  0.19 - 1.30 10*3/mm3 Final   • Eosinophils Absolute 10/09/2018 0.15  0.00 - 0.70 10*3/mm3 Final   • Basophils Absolute 10/09/2018 0.06  0.00 - 0.20 10*3/mm3 Final   • Immature Granulocyte Rel % 10/09/2018 0.2  0.0 - 5.0 % Final   • Immature Grans Absolute 10/09/2018 0.01  0.00 - 0.03 10*3/mm3 Final   • nRBC 10/09/2018 0.0  0.0 - 0.0 /100 WBC Final   • Glucose 10/09/2018 95  70 - 100 mg/dL Final   • BUN 10/09/2018 20  5 - 21 mg/dL Final   • Creatinine 10/09/2018 0.96  0.50 - 1.40 mg/dL Final   • eGFR Non  Am 10/09/2018 79  >60 mL/min/1.73 Final   • eGFR African Am 10/09/2018 96  >60 mL/min/1.73 Final   • BUN/Creatinine Ratio 10/09/2018 20.8  7.0 - 25.0 Final   • Sodium 10/09/2018 141  135 - 145 mmol/L Final   • Potassium 10/09/2018 4.5  3.5 - 5.3 mmol/L Final   • Chloride 10/09/2018 101  98 - 110 mmol/L Final   • Total CO2 10/09/2018 28.0  24.0 - 31.0 mmol/L Final   • Calcium 10/09/2018 9.1  8.4 - 10.4 mg/dL Final   • Total Protein 10/09/2018 7.0  6.3 - 8.7 g/dL Final   • Albumin 10/09/2018 4.20  3.50 - 5.00 g/dL Final   • Globulin 10/09/2018 2.8  gm/dL Final   • A/G Ratio 10/09/2018 1.5  1.1 - 2.5 g/dL Final   • Total Bilirubin 10/09/2018 0.5  0.1 - 1.0 mg/dL Final   • Alkaline Phosphatase 10/09/2018 48  24 - 120 U/L Final   • AST (SGOT) 10/09/2018 33  7 - 45 U/L Final   • ALT (SGPT) 10/09/2018 37  0 - 54 U/L Final   • Testosterone, Total 10/09/2018 919* 264 - 916 ng/dL Final    Comment: Adult male reference interval is based on a population of  healthy nonobese males (BMI <30) between 19 and 39 years old.  Edward et.al. JCEM 2017,102;4390-1496. PMID: [id number].         Results for orders placed or performed in visit on 10/16/18   POC Urinalysis Dipstick, Multipro   Result Value Ref Range    Color Yellow Yellow, Straw, Dark Yellow, Linda    Clarity, UA Clear Clear    Glucose, UA Negative Negative, 1000 mg/dL (3+) mg/dL    Bilirubin Negative Negative    Ketones,  UA Negative Negative    Specific Gravity  1.030 1.005 - 1.030    Blood, UA Trace (A) Negative    pH, Urine 6.0 5.0 - 8.0    Protein, POC 30 mg/dL (A) Negative mg/dL    Urobilinogen, UA Normal Normal    Nitrite, UA Negative Negative    Leukocytes Negative Negative       Assessment and Plan    Diagnoses and all orders for this visit:    Hypogonadism, male  -     POC Urinalysis Dipstick, Multipro  -     CBC & Differential; Future  -     Comprehensive Metabolic Panel; Future  -     Testosterone; Future    Benign prostatic hyperplasia with lower urinary tract symptoms, symptom details unspecified  -     PSA DIAGNOSTIC; Future    Erectile dysfunction, unspecified erectile dysfunction type    Plan--we will seen back again in 6 months time again I am asking to please make sure that he was not feeling too much the testosterone and we will repeat again when he returns.    First a BPH once again we did discuss possibility of increasing the Flomax twice a day or other options but he does not want to do anything further at this time.    His ED is stable   Non verbal Non verbal

## 2018-10-16 NOTE — PATIENT INSTRUCTIONS

## 2018-10-21 ENCOUNTER — RESULTS ENCOUNTER (OUTPATIENT)
Dept: UROLOGY | Facility: CLINIC | Age: 63
End: 2018-10-21

## 2018-10-21 DIAGNOSIS — E29.1 HYPOGONADISM, MALE: ICD-10-CM

## 2018-11-08 DIAGNOSIS — E29.1 HYPOGONADISM IN MALE: Primary | ICD-10-CM

## 2018-12-24 DIAGNOSIS — N52.9 ERECTILE DYSFUNCTION, UNSPECIFIED ERECTILE DYSFUNCTION TYPE: ICD-10-CM

## 2018-12-26 RX ORDER — VARDENAFIL HYDROCHLORIDE 20 MG/1
TABLET ORAL
Qty: 18 TABLET | Refills: 2 | Status: SHIPPED | OUTPATIENT
Start: 2018-12-26 | End: 2019-01-17

## 2019-01-17 ENCOUNTER — HOSPITAL ENCOUNTER (OUTPATIENT)
Dept: GENERAL RADIOLOGY | Facility: HOSPITAL | Age: 64
Discharge: HOME | End: 2019-01-17
Admitting: ORTHOPAEDIC SURGERY

## 2019-01-17 ENCOUNTER — APPOINTMENT (OUTPATIENT)
Dept: PREADMISSION TESTING | Facility: HOSPITAL | Age: 64
End: 2019-01-17

## 2019-01-17 VITALS
SYSTOLIC BLOOD PRESSURE: 159 MMHG | BODY MASS INDEX: 36.93 KG/M2 | HEART RATE: 85 BPM | WEIGHT: 257.94 LBS | RESPIRATION RATE: 16 BRPM | HEIGHT: 70 IN | DIASTOLIC BLOOD PRESSURE: 83 MMHG | OXYGEN SATURATION: 93 %

## 2019-01-17 LAB
ALBUMIN SERPL-MCNC: 4.5 G/DL (ref 3.5–5)
ALBUMIN/GLOB SERPL: 1.5 G/DL (ref 1.1–2.5)
ALP SERPL-CCNC: 43 U/L (ref 24–120)
ALT SERPL W P-5'-P-CCNC: 40 U/L (ref 0–54)
ANION GAP SERPL CALCULATED.3IONS-SCNC: 9 MMOL/L (ref 4–13)
AST SERPL-CCNC: 34 U/L (ref 7–45)
BILIRUB SERPL-MCNC: 0.5 MG/DL (ref 0.1–1)
BUN BLD-MCNC: 22 MG/DL (ref 5–21)
BUN/CREAT SERPL: 23.7 (ref 7–25)
CALCIUM SPEC-SCNC: 9.2 MG/DL (ref 8.4–10.4)
CHLORIDE SERPL-SCNC: 96 MMOL/L (ref 98–110)
CO2 SERPL-SCNC: 33 MMOL/L (ref 24–31)
CREAT BLD-MCNC: 0.93 MG/DL (ref 0.5–1.4)
DEPRECATED RDW RBC AUTO: 42.7 FL (ref 40–54)
ERYTHROCYTE [DISTWIDTH] IN BLOOD BY AUTOMATED COUNT: 17 % (ref 12–15)
GFR SERPL CREATININE-BSD FRML MDRD: 82 ML/MIN/1.73
GLOBULIN UR ELPH-MCNC: 3.1 GM/DL
GLUCOSE BLD-MCNC: 109 MG/DL (ref 70–100)
HCT VFR BLD AUTO: 53.2 % (ref 40–52)
HGB BLD-MCNC: 16.7 G/DL (ref 14–18)
INR PPP: 0.95 (ref 0.91–1.09)
MCH RBC QN AUTO: 24.5 PG (ref 28–32)
MCHC RBC AUTO-ENTMCNC: 31.4 G/DL (ref 33–36)
MCV RBC AUTO: 78.1 FL (ref 82–95)
PLATELET # BLD AUTO: 318 10*3/MM3 (ref 130–400)
PMV BLD AUTO: 9.5 FL (ref 6–12)
POTASSIUM BLD-SCNC: 3.7 MMOL/L (ref 3.5–5.3)
PROT SERPL-MCNC: 7.6 G/DL (ref 6.3–8.7)
PROTHROMBIN TIME: 13 SECONDS (ref 11.9–14.6)
RBC # BLD AUTO: 6.81 10*6/MM3 (ref 4.8–5.9)
SODIUM BLD-SCNC: 138 MMOL/L (ref 135–145)
WBC NRBC COR # BLD: 6.65 10*3/MM3 (ref 4.8–10.8)

## 2019-01-17 PROCEDURE — 93010 ELECTROCARDIOGRAM REPORT: CPT | Performed by: INTERNAL MEDICINE

## 2019-01-17 PROCEDURE — 36415 COLL VENOUS BLD VENIPUNCTURE: CPT

## 2019-01-17 PROCEDURE — 85027 COMPLETE CBC AUTOMATED: CPT | Performed by: ORTHOPAEDIC SURGERY

## 2019-01-17 PROCEDURE — 85610 PROTHROMBIN TIME: CPT | Performed by: ORTHOPAEDIC SURGERY

## 2019-01-17 PROCEDURE — 93005 ELECTROCARDIOGRAM TRACING: CPT

## 2019-01-17 PROCEDURE — 80053 COMPREHEN METABOLIC PANEL: CPT | Performed by: ORTHOPAEDIC SURGERY

## 2019-01-17 PROCEDURE — 71046 X-RAY EXAM CHEST 2 VIEWS: CPT

## 2019-01-17 RX ORDER — LOSARTAN POTASSIUM AND HYDROCHLOROTHIAZIDE 12.5; 1 MG/1; MG/1
1 TABLET ORAL DAILY
COMMUNITY
End: 2022-04-18

## 2019-01-17 NOTE — DISCHARGE INSTRUCTIONS
DAY OF SURGERY INSTRUCTIONS        YOUR SURGEON:     PROCEDURE: right reverse total shoulder arthroplasty    DATE OF SURGERY: 1/22/19    ARRIVAL TIME: AS DIRECTED BY OFFICE    YOU MAY TAKE THE FOLLOWING MEDICATION(S) THE MORNING OF SURGERY WITH A SIP OF WATER: none                MANAGING PAIN AFTER SURGERY    We know you are probably wondering what your pain will be like after surgery.  Following surgery it is unrealistic to expect you will not have pain.   Pain is how our bodies let us know that something is wrong or cautions us to be careful.  That said, our goal is to make your pain tolerable.    Methods we may use to treat your pain include (oral or IV medications, PCAs, epidurals, nerve blocks, etc.)   While some procedures require IV pain medications for a short time after surgery, transitioning to pain medications by mouth allows for better management of pain.   Your nurse will encourage you to take oral pain medications whenever possible.  IV medications work almost immediately, but only last a short while.  Taking medications by mouth allows for a more constant level of medication in your blood stream for a longer period of time.      Once your pain is out of control it is harder to get back under control.  It is important you are aware when your next dose of pain medication is due.  If you are admitted, your nurse may write the time of your next dose on the white board in your room to help you remember.      We are interested in your pain and encourage you to inform us about aggravating factors during your visit.   Many times a simple repositioning every few hours can make a big difference.    If your physician says it is okay, do not let your pain prevent you from getting out of bed. Be sure to call your nurse for assistance prior to getting up so you do not fall.      Before surgery, please decide your tolerable pain goal.  These faces help describe the pain ratings we use on a 0-10 scale.    Be prepared to tell us your goal and whether or not you take pain or anxiety medications at home.          BEFORE YOU COME TO THE HOSPITAL  (Pre-op instructions)  • Do not eat, drink, smoke or chew gum after midnight the night before surgery.  This also includes no mints.  • Morning of surgery take only the medicines you have been instructed with a sip of water unless otherwise instructed  by your physician.  • Do not shave, wear makeup or dark nail polish.  • Remove all jewelry including rings.  • Leave anything you consider valuable at home.  • Leave your suitcase in the car until after your surgery.  • Bring the following with you if applicable:  o Picture ID and insurance, Medicare or Medicaid cards  o Co-pay/deductible required by insurance (cash, check, credit card)  o Copy of advance directive, living will or power-of- documents if not brought to PAT  o CPAP or BIPAP mask and tubing  o Relaxation aids (MP3 player, book, magazine)  • On the day of surgery check in at registration located at the main entrance of the hospital.       Outpatient Surgery Guidelines, Adult  Outpatient procedures are those for which the person having the procedure is allowed to go home the same day as the procedure. Various procedures are done on an outpatient basis. You should follow some general guidelines if you will be having an outpatient procedure.  LET YOUR HEALTH CARE PROVIDER KNOW ABOUT:  · Any allergies you have.  · All medicines you are taking, including vitamins, herbs, eye drops, creams, and over-the-counter medicines.  · Previous problems you or members of your family have had with the use of anesthetics.  · Any blood disorders you have.  · Previous surgeries you have had.  · Medical conditions you have.  RISKS AND COMPLICATIONS  Your health care provider will discuss possible risks and complications with you before surgery. Common risks and complications include:    · Problems due to the use of  anesthetics.  · Blood loss and replacement (does not apply to minor surgical procedures).  · Temporary increase in pain due to surgery.  · Uncorrected pain or problems that the surgery was meant to correct.  · Infection.  · New damage.  BEFORE THE PROCEDURE  · Ask your health care provider about changing or stopping your regular medicines. You may need to stop taking certain medicines in the days or weeks before the procedure.  · Stop smoking at least 2 weeks before surgery. This lowers your risk for complications during and after surgery. Ask your health care provider for help with this if needed.  · Eat your usual meals and a light supper the day before surgery. Continue fluid intake. Do not drink alcohol.  · Do not eat or drink after midnight the night before your surgery.   · Arrange for someone to take you home and to stay with you for 24 hours after the procedure. Medicine given for your procedure may affect your ability to drive or to care for yourself.  · Call your health care provider's office if you develop an illness or problem that may prevent you from safely having your procedure.  AFTER THE PROCEDURE  After surgery, you will be taken to a recovery area, where your progress will be monitored. If there are no complications, you will be allowed to go home when you are awake, stable, and taking fluids well. You may have numbness around the surgical site. Healing will take some time. You will have tenderness at the surgical site and may have some swelling and bruising. You may also have some nausea.  HOME CARE INSTRUCTIONS  · Do not drive for 24 hours, or as directed by your health care provider. Do not drive while taking prescription pain medicines.  · Do not drink alcohol for 24 hours.  · Do not make important decisions or sign legal documents for 24 hours.  · You may resume a normal diet and activities as directed.  · Do not lift anything heavier than 10 pounds (4.5 kg) or play contact sports until your  health care provider says it is okay.  · Change your bandages (dressings) as directed.  · Only take over-the-counter or prescription medicines as directed by your health care provider.  · Follow up with your health care provider as directed.  SEEK MEDICAL CARE IF:  · You have increased bleeding (more than a small spot) from the surgical site.  · You have redness, swelling, or increasing pain in the wound.  · You see pus coming from the wound.  · You have a fever.  · You notice a bad smell coming from the wound or dressing.  · You feel lightheaded or faint.  · You develop a rash.  · You have trouble breathing.  · You develop allergies.  MAKE SURE YOU:  · Understand these instructions.  · Will watch your condition.  · Will get help right away if you are not doing well or get worse.     This information is not intended to replace advice given to you by your health care provider. Make sure you discuss any questions you have with your health care provider.     Document Released: 09/12/2002 Document Revised: 05/03/2016 Document Reviewed: 05/22/2014  [employer] Interactive Patient Education ©2016 [employer]       Fall Prevention in Hospitals, Adult  As a hospital patient, your condition and the treatments you receive can increase your risk for falls. Some additional risk factors for falls in a hospital include:  · Being in an unfamiliar environment.  · Being on bed rest.  · Your surgery.  · Taking certain medicines.  · Your tubing requirements, such as intravenous (IV) therapy or catheters.  It is important that you learn how to decrease fall risks while at the hospital. Below are important tips that can help prevent falls.  SAFETY TIPS FOR PREVENTING FALLS  Talk about your risk of falling.  · Ask your health care provider why you are at risk for falling. Is it your medicine, illness, tubing placement, or something else?  · Make a plan with your health care provider to keep you safe from falls.  · Ask your health care  provider or pharmacist about side effects of your medicines. Some medicines can make you dizzy or affect your coordination.  Ask for help.  · Ask for help before getting out of bed. You may need to press your call button.  · Ask for assistance in getting safely to the toilet.  · Ask for a walker or cane to be put at your bedside. Ask that most of the side rails on your bed be placed up before your health care provider leaves the room.  · Ask family or friends to sit with you.  · Ask for things that are out of your reach, such as your glasses, hearing aids, telephone, bedside table, or call button.  Follow these tips to avoid falling:  · Stay lying or seated, rather than standing, while waiting for help.  · Wear rubber-soled slippers or shoes whenever you walk in the hospital.  · Avoid quick, sudden movements.  ¨ Change positions slowly.  ¨ Sit on the side of your bed before standing.  ¨ Stand up slowly and wait before you start to walk.  · Let your health care provider know if there is a spill on the floor.  · Pay careful attention to the medical equipment, electrical cords, and tubes around you.  · When you need help, use your call button by your bed or in the bathroom. Wait for one of your health care providers to help you.  · If you feel dizzy or unsure of your footing, return to bed and wait for assistance.  · Avoid being distracted by the TV, telephone, or another person in your room.  · Do not lean or support yourself on rolling objects, such as IV poles or bedside tables.     This information is not intended to replace advice given to you by your health care provider. Make sure you discuss any questions you have with your health care provider.     Document Released: 12/15/2001 Document Revised: 01/08/2016 Document Reviewed: 08/25/2013  [employer] Interactive Patient Education ©2016 [employer]       Surgical Site Infections FAQs  What is a Surgical Site Infection (SSI)?  A surgical site infection is an  infection that occurs after surgery in the part of the body where the surgery took place. Most patients who have surgery do not develop an infection. However, infections develop in about 1 to 3 out of every 100 patients who have surgery.  Some of the common symptoms of a surgical site infection are:  · Redness and pain around the area where you had surgery  · Drainage of cloudy fluid from your surgical wound  · Fever  Can SSIs be treated?  Yes. Most surgical site infections can be treated with antibiotics. The antibiotic given to you depends on the bacteria (germs) causing the infection. Sometimes patients with SSIs also need another surgery to treat the infection.  What are some of the things that hospitals are doing to prevent SSIs?  To prevent SSIs, doctors, nurses, and other healthcare providers:  · Clean their hands and arms up to their elbows with an antiseptic agent just before the surgery.  · Clean their hands with soap and water or an alcohol-based hand rub before and after caring for each patient.  · May remove some of your hair immediately before your surgery using electric clippers if the hair is in the same area where the procedure will occur. They should not shave you with a razor.  · Wear special hair covers, masks, gowns, and gloves during surgery to keep the surgery area clean.  · Give you antibiotics before your surgery starts. In most cases, you should get antibiotics within 60 minutes before the surgery starts and the antibiotics should be stopped within 24 hours after surgery.  · Clean the skin at the site of your surgery with a special soap that kills germs.  What can I do to help prevent SSIs?  Before your surgery:  · Tell your doctor about other medical problems you may have. Health problems such as allergies, diabetes, and obesity could affect your surgery and your treatment.  · Quit smoking. Patients who smoke get more infections. Talk to your doctor about how you can quit before your  surgery.  · Do not shave near where you will have surgery. Shaving with a razor can irritate your skin and make it easier to develop an infection.  At the time of your surgery:  · Speak up if someone tries to shave you with a razor before surgery. Ask why you need to be shaved and talk with your surgeon if you have any concerns.  · Ask if you will get antibiotics before surgery.  After your surgery:  · Make sure that your healthcare providers clean their hands before examining you, either with soap and water or an alcohol-based hand rub.  · If you do not see your providers clean their hands, please ask them to do so.  · Family and friends who visit you should not touch the surgical wound or dressings.  · Family and friends should clean their hands with soap and water or an alcohol-based hand rub before and after visiting you. If you do not see them clean their hands, ask them to clean their hands.  What do I need to do when I go home from the hospital?  · Before you go home, your doctor or nurse should explain everything you need to know about taking care of your wound. Make sure you understand how to care for your wound before you leave the hospital.  · Always clean your hands before and after caring for your wound.  · Before you go home, make sure you know who to contact if you have questions or problems after you get home.  · If you have any symptoms of an infection, such as redness and pain at the surgery site, drainage, or fever, call your doctor immediately.  If you have additional questions, please ask your doctor or nurse.  Developed and co-sponsored by The Society for Healthcare Epidemiology of [place] (SHEA); Infectious Diseases Society of [place] (IDSA); American Hospital Association; Association for Professionals in Infection Control and Epidemiology (APIC); Centers for Disease Control and Prevention (CDC); and The Joint Commission.     This information is not intended to replace advice given to you by  your health care provider. Make sure you discuss any questions you have with your health care provider.     Document Released: 12/23/2014 Document Revised: 01/08/2016 Document Reviewed: 03/02/2016  [employer] Interactive Patient Education ©2016 [employer]       [facility]  CHG 4% Patient Instruction Sheet    Preparing the Skin Before Surgery  Preparing or “prepping” skin before surgery can reduce the risk of infection at the surgical site. To make the process easier,[facility] has chosen 4% Chlorhexidine Gluconate (CHG) antiseptic solution.   The steps below outline the prepping process and should be carefully followed.                                                                                                                                                      Prep the skin at the following time(s):                                                      We recommend you shower the night before surgery, and again the morning of surgery with the 4% CHG antiseptic solution using half of the bottle and a cloth each time.  Dress in clean clothes/sleepwear after showering.  See instructions below for application.          Do not apply any lotions or moisturizers.       Do not shave the area to be prepped for at least 2 days prior to surgery.    Clipping the hair may be done immediately prior to your surgery at the hospital    if needed.    Directions:  Thoroughly rinse your body with water.  Apply 4% CHG to a cloth and wash skin gently, paying special attention to the operative site.  Rinse again thoroughly.  Once you have begun using this product do not apply anything else to your skin. If itching or redness persists, rinse affected areas and discontinue use.    When using this product:  • Keep out of eyes, ears, and mouth.  • If solution should contact these areas, rinse out promptly and thoroughly with water.  • For external use only.  • Do not use in genital area, on your face or  head.      PATIENT/FAMILY/RESPONSIBLE PARTY VERBALIZES UNDERSTANDING OF ABOVE EDUCATION.  COPY OF PAIN SCALE GIVEN AND REVIEWED WITH VERBALIZED UNDERSTANDING.

## 2019-01-21 PROBLEM — M12.811 ROTATOR CUFF ARTHROPATHY OF RIGHT SHOULDER: Status: ACTIVE | Noted: 2019-01-21

## 2019-01-21 NOTE — OP NOTE
Patient Name: Gavin  MRN: [id number]  : 1955      DATE of SURGERY: 19    SURGEON: Donnie Frias MD    ASSISTANT: NONE    PREOPERATIVE DIAGNOSIS: Right Shoulder Primary Osteoarthritis    POSTOPERATIVE DIAGNOSIS:  Right Shoulder Primary Osteoarthritis    PROCEDURE PERFORMED:  Right Reverse Total Shoulder Arthroplasty    IMPLANTS: [employer]                Baseplate: large                Glenosphere: 42 + 4                Poly: + 6 metal, + 3 poly                Suture Cup: 42 neutral                              Stem: 14 pressfit    ANESTHESIA USED: General endotrachial anesthesia, interscalene block    OPERATIVE INDICATIONS: 63 y.o. male with progressive loss of function and increasing pain of the upper extremity due to severe end stage osteoarthritis associated with a diseased and dysfunctional rotator cuff. Due to loss of function and progressive pain, a reverse shoulder arthroplasty is planned to improve function and decrease pain. Surgical evaluation was discussed and the patient wished to proceed understanding risks, benefits, and alternatives. The surgical indications were to relieve pain, improve function, and prevent future disability in regards to the shoulder pathology dictated in the aboved diagnoses.  Risks included, but were not limited to, that of anesthesia, bleeding, infection, pain, damage to local structures, postoperative dislocation, need for further surgery, instability, stiffness, failure of implants, and loss of function.    The patient has failed a combination of the following improve pain and function: physical therapy >12 wks, corticosteroid injections, NSAID’s, activity modification.     ESTIMATED BLOOD LOSS: 150 mL    DRAINS: none     COMPLICATIONS: none    SPECIMENS: none    FINDINGS: see op note    PROCEDURE in DETAIL:  The patient was seen in the preoperative holding room, once again the informed consent was reviewed with the patient and signed.  The  "site of surgery was marked with the patient's agreement.  After being transported to the operating room, a timeout was performed identifying the correct patient as well as the operative site.  Dose appropriate IV antibiotics were given prior to incision.  The patient was positioned in the beach chair position, all bony prominences were protected and a sterile prep and drape was performed.  The surgical site was draped with ioban dressing.    A deltopectoral approach to the shoulder joint was utilized as soft tissue was dissected down the level of the cephalic vein which was taken laterally along with the deltoid.  The biceps tendon was located, tenodesed to the superior border of the pectoralis major tendon insertion.  The rotator interval was opened.  A tagging stitch was placed in the subscapularis and with progressive external rotation of the shoulder, the tendon and underlying capsule were peeled from the less tuberosity.  The humeral head was dislocated from the glenoid and strategic retractors were placed to protect the surrounding soft tissue.  The axillary nerve was palpated and protected, verified with a \"tug test.\"    Beginning a the apex of the humeral head, a starting reamer was introduced into the shaft of the humerus, followed by reaming with a 5 and 6 mm reamer.  The proximal humeral osteotomy guide was inserted and an osteotomy was performed and 135 degrees in 30 degrees of retroversion.  A protective plate was placed on the osteotomy site and attention was turned to the glenoid.      Again, strategic retractors were placed surround the glenoid, the axillary nerve was protected, and a complete capsulectomy was performed.  The labrum was excised.  A guidepin was placed in the inferior-central aspect of the glenoid, following by a reaming device, and drilling of the central peg hole.  A baseplate was impacted and superior, central, and inferior locking screws were inserted.  The glenosphere was then " impacted without complication.    Attention was turned back to the humeral side where progressively sized broaches were inserted until a stable fit was achieved, followed by the metaphyseal reaming guide.  The metaphysis was reamed and a trial stem inserted.  Trial polyethylenes were placed in the suture cup until range of motion and stability were adequate.  The conjoint tendon showed increased tension. With traction of the shoulder, the entire scapula was translating without dissociation of the polyethylene.    Trial implants were removed, final implants impacted, and the shoulder was once again reduced showing excellent stability and range of motion.    The incision was thoroughly irrigated, followed by closure in layers.  The skin was closed with adhesive glue.  A sterile dressing and sling were placed.  Counts were correct.    The patient was awakened by anesthesia, transported to the recovery room in stable condition.    POSTOPERATIVE PLAN:  1) Admit inpatient for pain control, neurovascular monitoring  2) Discharge home once pain is controlled  3) Reverse total shoulder protocol    Electronically signed by Donnie Frias MD on 1/22/2019 at 4:39 PM

## 2019-01-21 NOTE — DISCHARGE INSTRUCTIONS
UPPER EXTREMITY POST-OP INSTRUCTIONS - DR. VENTURA    IMPORTANT PHONE NUMBERS:  • For emergencies, please call [phone]  • You may reach Dr. Ventura and clinical staff at [phone]- M-F 8:00 am-5:00 pm  • After 5pm or on the weekends, please call [phone]  • Call immediately if you have any of the following symptoms:     Elevated temperature above 101.5 degrees for more than 48 hours after surgery     Persistent drainage from wound     Severe pain around surgical site    Sling use: The sling is provided for your comfort and to ensure proper healing of your repair following surgery. Please place the abduction pillow with the curved side against your side and the sling on the side of the pillow. Your surgery requires that you wear the sling if noted below.  ____ For comfort. Remove sling 24 hours and begin range of motion exercises  __X__ At all times except bathing, dressing, and therapy. Also wear the sling during sleep.  ____ No sling required    Bathing:  ___No bandages, no restrictions!!  _X__You may remove you dressing and shower on the 3rd day after surgery (Ex. Tues surgery, shower on Friday)  ** if you are told to it is ok to remove your dressing and shower, DO NOT SOAK your incisions in a tub.  ___Keep splint clean, dry, and intact. DO NOT place foreign objects into your splint.      Dressings: Keep dressing/splint intact unless instructed otherwise below. SOME DRAINAGE IS NORMAL!    • DO NOT touch or apply ointment to the incision.    • DO NOT remove the steri-strips over the incisions (if you have steri-strips). They will         generally fall off on their own or can be removed 1 weeksafter surgery.    • If you have yellow gauze and it comes off, do not worry about it. Leave them off.   • Signs of infection that warrant a phone call to our clinical line:     o Excessive drainage or redness     o Red streaking coming away from the incision  o Increased pain  o Increased temperature  above 101 degrees      Physical Therapy:        *  Your physical therapy status will be discussed with you postoperatively and at your first post-op appointment. Some injuries will not require physical therapy.      *  If you have a shoulder manipulation, please schedule therapy for the next day      Medications: You will be discharged with the appropriate medications following your surgery. Fill these at the pharmacy and take them as directed on the label. Not all of the medications below may be prescribed. Occasionally, other medications may be prescribed with specific instructions.    Percocet/Lortab (oxycodone/hydrocodone with tylenol) - Pain Medication, will cause drowsiness, possibly itchiness (this is NOT an allergy - use benadryl or an over the counter allergy medication such as Claritin or Zyrtec)     o Take 1-2 tablets every 4-6 hours. DO NOT EXCEED 4,000mg of Tylenol in 24 hours.  **DO NOT MIX WITH ALCOHOL, DRIVE WHILE TAKING, OR TAKE with extra TYLENOL**    Colace (Docusate) - stool softener, used for constipation. Take this only if you feel constipated.      Zofran (Ondansetron) or Phenergan - Anti-nausea medication, will cause drowsiness      **If you are running low on pain medications, please notify us if you need a refill 24-48 hours prior to when you run out, so we can make arrangements to refill the prescription for you if we determine is necessary

## 2019-01-22 ENCOUNTER — ANESTHESIA EVENT (OUTPATIENT)
Dept: PERIOP | Facility: HOSPITAL | Age: 64
End: 2019-01-22

## 2019-01-22 ENCOUNTER — APPOINTMENT (OUTPATIENT)
Dept: GENERAL RADIOLOGY | Facility: HOSPITAL | Age: 64
End: 2019-01-22

## 2019-01-22 ENCOUNTER — HOSPITAL ENCOUNTER (INPATIENT)
Facility: HOSPITAL | Age: 64
LOS: 1 days | Discharge: HOME | End: 2019-01-23
Attending: ORTHOPAEDIC SURGERY | Admitting: ORTHOPAEDIC SURGERY

## 2019-01-22 ENCOUNTER — ANESTHESIA (OUTPATIENT)
Dept: PERIOP | Facility: HOSPITAL | Age: 64
End: 2019-01-22

## 2019-01-22 DIAGNOSIS — M12.811 ROTATOR CUFF ARTHROPATHY OF RIGHT SHOULDER: Primary | ICD-10-CM

## 2019-01-22 DIAGNOSIS — M19.011 PRIMARY OSTEOARTHRITIS OF RIGHT SHOULDER: ICD-10-CM

## 2019-01-22 LAB
ABO GROUP BLD: NORMAL
BLD GP AB SCN SERPL QL: NEGATIVE
RH BLD: POSITIVE
T&S EXPIRATION DATE: NORMAL

## 2019-01-22 PROCEDURE — 25010000002 ROPIVACAINE PER 1 MG: Performed by: ANESTHESIOLOGY

## 2019-01-22 PROCEDURE — 25010000002 ONDANSETRON PER 1 MG: Performed by: NURSE ANESTHETIST, CERTIFIED REGISTERED

## 2019-01-22 PROCEDURE — 76000 FLUOROSCOPY <1 HR PHYS/QHP: CPT

## 2019-01-22 PROCEDURE — 25010000002 PROPOFOL 10 MG/ML EMULSION: Performed by: NURSE ANESTHETIST, CERTIFIED REGISTERED

## 2019-01-22 PROCEDURE — C1776 JOINT DEVICE (IMPLANTABLE): HCPCS | Performed by: ORTHOPAEDIC SURGERY

## 2019-01-22 PROCEDURE — 73030 X-RAY EXAM OF SHOULDER: CPT

## 2019-01-22 PROCEDURE — 73020 X-RAY EXAM OF SHOULDER: CPT

## 2019-01-22 PROCEDURE — 86850 RBC ANTIBODY SCREEN: CPT | Performed by: ORTHOPAEDIC SURGERY

## 2019-01-22 PROCEDURE — 25010000002 CEFAZOLIN PER 500 MG: Performed by: ORTHOPAEDIC SURGERY

## 2019-01-22 PROCEDURE — 0RRJ00Z REPLACEMENT OF RIGHT SHOULDER JOINT WITH REVERSE BALL AND SOCKET SYNTHETIC SUBSTITUTE, OPEN APPROACH: ICD-10-PCS | Performed by: ORTHOPAEDIC SURGERY

## 2019-01-22 PROCEDURE — 25010000002 NEOSTIGMINE PER 0.5 MG: Performed by: NURSE ANESTHETIST, CERTIFIED REGISTERED

## 2019-01-22 PROCEDURE — C1713 ANCHOR/SCREW BN/BN,TIS/BN: HCPCS | Performed by: ORTHOPAEDIC SURGERY

## 2019-01-22 PROCEDURE — 94760 N-INVAS EAR/PLS OXIMETRY 1: CPT

## 2019-01-22 PROCEDURE — 94799 UNLISTED PULMONARY SVC/PX: CPT

## 2019-01-22 PROCEDURE — 86900 BLOOD TYPING SEROLOGIC ABO: CPT | Performed by: ORTHOPAEDIC SURGERY

## 2019-01-22 PROCEDURE — 25010000002 MIDAZOLAM PER 1 MG: Performed by: ANESTHESIOLOGY

## 2019-01-22 PROCEDURE — 86901 BLOOD TYPING SEROLOGIC RH(D): CPT | Performed by: ORTHOPAEDIC SURGERY

## 2019-01-22 DEVICE — IMPLANTABLE DEVICE: Type: IMPLANTABLE DEVICE | Status: FUNCTIONAL

## 2019-01-22 DEVICE — LINER HUM UNIVERS REVERS CNSTR LG 42 PLS3: Type: IMPLANTABLE DEVICE | Status: FUNCTIONAL

## 2019-01-22 DEVICE — SCRW GLEN UNIVERS REVERS CENTRL 6.5X20MM: Type: IMPLANTABLE DEVICE | Status: FUNCTIONAL

## 2019-01-22 DEVICE — BASEPLT GLEN UNIVERS REVERS LG: Type: IMPLANTABLE DEVICE | Status: FUNCTIONAL

## 2019-01-22 DEVICE — SPACR HUM UNIVERS REVERS TI 42MM PLS6: Type: IMPLANTABLE DEVICE | Status: FUNCTIONAL

## 2019-01-22 DEVICE — CUP SUT UNIVERS REVERS 42 NTRL: Type: IMPLANTABLE DEVICE | Status: FUNCTIONAL

## 2019-01-22 DEVICE — GLENOSPHERE UNIVERS REVERS L/42 PLS4 LAT: Type: IMPLANTABLE DEVICE | Status: FUNCTIONAL

## 2019-01-22 DEVICE — SCRW GLEN UNIVERS REVERS PERIPH 4.5X30MM: Type: IMPLANTABLE DEVICE | Status: FUNCTIONAL

## 2019-01-22 RX ORDER — ROCURONIUM BROMIDE 10 MG/ML
INJECTION, SOLUTION INTRAVENOUS AS NEEDED
Status: DISCONTINUED | OUTPATIENT
Start: 2019-01-22 | End: 2019-01-22 | Stop reason: SURG

## 2019-01-22 RX ORDER — SODIUM CHLORIDE, SODIUM LACTATE, POTASSIUM CHLORIDE, CALCIUM CHLORIDE 600; 310; 30; 20 MG/100ML; MG/100ML; MG/100ML; MG/100ML
100 INJECTION, SOLUTION INTRAVENOUS CONTINUOUS
Status: DISCONTINUED | OUTPATIENT
Start: 2019-01-22 | End: 2019-01-23 | Stop reason: HOSPADM

## 2019-01-22 RX ORDER — ONDANSETRON 4 MG/1
4 TABLET, ORALLY DISINTEGRATING ORAL EVERY 6 HOURS PRN
Status: DISCONTINUED | OUTPATIENT
Start: 2019-01-22 | End: 2019-01-23 | Stop reason: HOSPADM

## 2019-01-22 RX ORDER — MEPERIDINE HYDROCHLORIDE 25 MG/ML
12.5 INJECTION INTRAMUSCULAR; INTRAVENOUS; SUBCUTANEOUS
Status: DISCONTINUED | OUTPATIENT
Start: 2019-01-22 | End: 2019-01-22 | Stop reason: HOSPADM

## 2019-01-22 RX ORDER — BUPIVACAINE HCL/0.9 % NACL/PF 0.1 %
2 PLASTIC BAG, INJECTION (ML) EPIDURAL ONCE
Status: COMPLETED | OUTPATIENT
Start: 2019-01-22 | End: 2019-01-22

## 2019-01-22 RX ORDER — NALOXONE HCL 0.4 MG/ML
0.4 VIAL (ML) INJECTION
Status: DISCONTINUED | OUTPATIENT
Start: 2019-01-22 | End: 2019-01-23 | Stop reason: HOSPADM

## 2019-01-22 RX ORDER — ONDANSETRON 2 MG/ML
INJECTION INTRAMUSCULAR; INTRAVENOUS AS NEEDED
Status: DISCONTINUED | OUTPATIENT
Start: 2019-01-22 | End: 2019-01-22 | Stop reason: SURG

## 2019-01-22 RX ORDER — SODIUM CHLORIDE 0.9 % (FLUSH) 0.9 %
1-10 SYRINGE (ML) INJECTION AS NEEDED
Status: DISCONTINUED | OUTPATIENT
Start: 2019-01-22 | End: 2019-01-22 | Stop reason: HOSPADM

## 2019-01-22 RX ORDER — DIPHENHYDRAMINE HYDROCHLORIDE 50 MG/ML
25 INJECTION INTRAMUSCULAR; INTRAVENOUS EVERY 6 HOURS PRN
Status: DISCONTINUED | OUTPATIENT
Start: 2019-01-22 | End: 2019-01-23 | Stop reason: HOSPADM

## 2019-01-22 RX ORDER — SODIUM CHLORIDE 0.9 % (FLUSH) 0.9 %
3 SYRINGE (ML) INJECTION EVERY 12 HOURS SCHEDULED
Status: DISCONTINUED | OUTPATIENT
Start: 2019-01-22 | End: 2019-01-22 | Stop reason: HOSPADM

## 2019-01-22 RX ORDER — PROMETHAZINE HYDROCHLORIDE 25 MG/ML
12.5 INJECTION, SOLUTION INTRAMUSCULAR; INTRAVENOUS EVERY 6 HOURS PRN
Status: DISCONTINUED | OUTPATIENT
Start: 2019-01-22 | End: 2019-01-23 | Stop reason: HOSPADM

## 2019-01-22 RX ORDER — MORPHINE SULFATE 2 MG/ML
2 INJECTION, SOLUTION INTRAMUSCULAR; INTRAVENOUS
Status: DISCONTINUED | OUTPATIENT
Start: 2019-01-22 | End: 2019-01-22 | Stop reason: HOSPADM

## 2019-01-22 RX ORDER — ONDANSETRON 4 MG/1
4 TABLET, FILM COATED ORAL EVERY 6 HOURS PRN
Status: DISCONTINUED | OUTPATIENT
Start: 2019-01-22 | End: 2019-01-23 | Stop reason: HOSPADM

## 2019-01-22 RX ORDER — DOCUSATE SODIUM 100 MG/1
100 CAPSULE, LIQUID FILLED ORAL 2 TIMES DAILY PRN
Status: DISCONTINUED | OUTPATIENT
Start: 2019-01-22 | End: 2019-01-23 | Stop reason: HOSPADM

## 2019-01-22 RX ORDER — SODIUM CHLORIDE 0.9 % (FLUSH) 0.9 %
3 SYRINGE (ML) INJECTION EVERY 12 HOURS SCHEDULED
Status: DISCONTINUED | OUTPATIENT
Start: 2019-01-22 | End: 2019-01-23 | Stop reason: HOSPADM

## 2019-01-22 RX ORDER — SODIUM CHLORIDE 0.9 % (FLUSH) 0.9 %
1-10 SYRINGE (ML) INJECTION AS NEEDED
Status: DISCONTINUED | OUTPATIENT
Start: 2019-01-22 | End: 2019-01-23 | Stop reason: HOSPADM

## 2019-01-22 RX ORDER — DIPHENHYDRAMINE HCL 25 MG
25 CAPSULE ORAL EVERY 6 HOURS PRN
Status: DISCONTINUED | OUTPATIENT
Start: 2019-01-22 | End: 2019-01-23 | Stop reason: HOSPADM

## 2019-01-22 RX ORDER — SODIUM CHLORIDE, SODIUM LACTATE, POTASSIUM CHLORIDE, CALCIUM CHLORIDE 600; 310; 30; 20 MG/100ML; MG/100ML; MG/100ML; MG/100ML
1000 INJECTION, SOLUTION INTRAVENOUS CONTINUOUS
Status: DISCONTINUED | OUTPATIENT
Start: 2019-01-22 | End: 2019-01-23 | Stop reason: HOSPADM

## 2019-01-22 RX ORDER — ACETAMINOPHEN 650 MG/1
650 SUPPOSITORY RECTAL EVERY 4 HOURS PRN
Status: DISCONTINUED | OUTPATIENT
Start: 2019-01-22 | End: 2019-01-23 | Stop reason: HOSPADM

## 2019-01-22 RX ORDER — MIDAZOLAM HYDROCHLORIDE 1 MG/ML
1 INJECTION INTRAMUSCULAR; INTRAVENOUS
Status: DISCONTINUED | OUTPATIENT
Start: 2019-01-22 | End: 2019-01-22 | Stop reason: HOSPADM

## 2019-01-22 RX ORDER — ONDANSETRON 2 MG/ML
4 INJECTION INTRAMUSCULAR; INTRAVENOUS AS NEEDED
Status: DISCONTINUED | OUTPATIENT
Start: 2019-01-22 | End: 2019-01-22 | Stop reason: HOSPADM

## 2019-01-22 RX ORDER — MORPHINE SULFATE 10 MG/ML
6 INJECTION INTRAMUSCULAR; INTRAVENOUS; SUBCUTANEOUS
Status: DISCONTINUED | OUTPATIENT
Start: 2019-01-22 | End: 2019-01-23 | Stop reason: HOSPADM

## 2019-01-22 RX ORDER — LIDOCAINE HYDROCHLORIDE 20 MG/ML
INJECTION, SOLUTION INFILTRATION; PERINEURAL AS NEEDED
Status: DISCONTINUED | OUTPATIENT
Start: 2019-01-22 | End: 2019-01-22 | Stop reason: SURG

## 2019-01-22 RX ORDER — OXYCODONE AND ACETAMINOPHEN 10; 325 MG/1; MG/1
1 TABLET ORAL ONCE AS NEEDED
Status: DISCONTINUED | OUTPATIENT
Start: 2019-01-22 | End: 2019-01-22 | Stop reason: HOSPADM

## 2019-01-22 RX ORDER — FLUMAZENIL 0.1 MG/ML
0.2 INJECTION INTRAVENOUS AS NEEDED
Status: DISCONTINUED | OUTPATIENT
Start: 2019-01-22 | End: 2019-01-22 | Stop reason: HOSPADM

## 2019-01-22 RX ORDER — TAMSULOSIN HYDROCHLORIDE 0.4 MG/1
0.4 CAPSULE ORAL DAILY
Status: DISCONTINUED | OUTPATIENT
Start: 2019-01-22 | End: 2019-01-23 | Stop reason: HOSPADM

## 2019-01-22 RX ORDER — MIDAZOLAM HYDROCHLORIDE 1 MG/ML
2 INJECTION INTRAMUSCULAR; INTRAVENOUS
Status: DISCONTINUED | OUTPATIENT
Start: 2019-01-22 | End: 2019-01-22 | Stop reason: HOSPADM

## 2019-01-22 RX ORDER — FENTANYL CITRATE 50 UG/ML
25 INJECTION, SOLUTION INTRAMUSCULAR; INTRAVENOUS
Status: DISCONTINUED | OUTPATIENT
Start: 2019-01-22 | End: 2019-01-22 | Stop reason: HOSPADM

## 2019-01-22 RX ORDER — GLYCOPYRROLATE 0.2 MG/ML
INJECTION INTRAMUSCULAR; INTRAVENOUS AS NEEDED
Status: DISCONTINUED | OUTPATIENT
Start: 2019-01-22 | End: 2019-01-22 | Stop reason: SURG

## 2019-01-22 RX ORDER — OXYCODONE AND ACETAMINOPHEN 7.5; 325 MG/1; MG/1
2 TABLET ORAL EVERY 4 HOURS PRN
Status: DISCONTINUED | OUTPATIENT
Start: 2019-01-22 | End: 2019-01-23 | Stop reason: HOSPADM

## 2019-01-22 RX ORDER — LABETALOL HYDROCHLORIDE 5 MG/ML
5 INJECTION, SOLUTION INTRAVENOUS
Status: DISCONTINUED | OUTPATIENT
Start: 2019-01-22 | End: 2019-01-22 | Stop reason: HOSPADM

## 2019-01-22 RX ORDER — PHENYLEPHRINE HCL IN 0.9% NACL 0.8MG/10ML
SYRINGE (ML) INTRAVENOUS AS NEEDED
Status: DISCONTINUED | OUTPATIENT
Start: 2019-01-22 | End: 2019-01-22 | Stop reason: SURG

## 2019-01-22 RX ORDER — SODIUM CHLORIDE 0.9 % (FLUSH) 0.9 %
3 SYRINGE (ML) INJECTION AS NEEDED
Status: DISCONTINUED | OUTPATIENT
Start: 2019-01-22 | End: 2019-01-22 | Stop reason: HOSPADM

## 2019-01-22 RX ORDER — ONDANSETRON 2 MG/ML
4 INJECTION INTRAMUSCULAR; INTRAVENOUS EVERY 6 HOURS PRN
Status: DISCONTINUED | OUTPATIENT
Start: 2019-01-22 | End: 2019-01-23 | Stop reason: HOSPADM

## 2019-01-22 RX ORDER — HYDRALAZINE HYDROCHLORIDE 20 MG/ML
5 INJECTION INTRAMUSCULAR; INTRAVENOUS
Status: DISCONTINUED | OUTPATIENT
Start: 2019-01-22 | End: 2019-01-22 | Stop reason: HOSPADM

## 2019-01-22 RX ORDER — OXYCODONE HCL 20 MG/1
20 TABLET, FILM COATED, EXTENDED RELEASE ORAL EVERY 12 HOURS SCHEDULED
Status: DISCONTINUED | OUTPATIENT
Start: 2019-01-22 | End: 2019-01-23 | Stop reason: HOSPADM

## 2019-01-22 RX ORDER — MAGNESIUM HYDROXIDE 1200 MG/15ML
LIQUID ORAL AS NEEDED
Status: DISCONTINUED | OUTPATIENT
Start: 2019-01-22 | End: 2019-01-22 | Stop reason: HOSPADM

## 2019-01-22 RX ORDER — SENNA AND DOCUSATE SODIUM 50; 8.6 MG/1; MG/1
2 TABLET, FILM COATED ORAL NIGHTLY
Status: DISCONTINUED | OUTPATIENT
Start: 2019-01-22 | End: 2019-01-23 | Stop reason: HOSPADM

## 2019-01-22 RX ORDER — NALOXONE HCL 0.4 MG/ML
0.04 VIAL (ML) INJECTION AS NEEDED
Status: DISCONTINUED | OUTPATIENT
Start: 2019-01-22 | End: 2019-01-22 | Stop reason: HOSPADM

## 2019-01-22 RX ORDER — FAMOTIDINE 20 MG/1
40 TABLET, FILM COATED ORAL DAILY
Status: DISCONTINUED | OUTPATIENT
Start: 2019-01-22 | End: 2019-01-23 | Stop reason: HOSPADM

## 2019-01-22 RX ORDER — FENTANYL CITRATE 50 UG/ML
25 INJECTION, SOLUTION INTRAMUSCULAR; INTRAVENOUS AS NEEDED
Status: DISCONTINUED | OUTPATIENT
Start: 2019-01-22 | End: 2019-01-22 | Stop reason: HOSPADM

## 2019-01-22 RX ORDER — ROPIVACAINE HYDROCHLORIDE 5 MG/ML
INJECTION, SOLUTION EPIDURAL; INFILTRATION; PERINEURAL
Status: COMPLETED | OUTPATIENT
Start: 2019-01-22 | End: 2019-01-22

## 2019-01-22 RX ORDER — ACETAMINOPHEN 325 MG/1
650 TABLET ORAL EVERY 4 HOURS PRN
Status: DISCONTINUED | OUTPATIENT
Start: 2019-01-22 | End: 2019-01-23 | Stop reason: HOSPADM

## 2019-01-22 RX ORDER — ACETAMINOPHEN 500 MG
1000 TABLET ORAL ONCE
Status: COMPLETED | OUTPATIENT
Start: 2019-01-22 | End: 2019-01-22

## 2019-01-22 RX ORDER — IPRATROPIUM BROMIDE AND ALBUTEROL SULFATE 2.5; .5 MG/3ML; MG/3ML
3 SOLUTION RESPIRATORY (INHALATION) ONCE AS NEEDED
Status: DISCONTINUED | OUTPATIENT
Start: 2019-01-22 | End: 2019-01-22 | Stop reason: HOSPADM

## 2019-01-22 RX ORDER — METOCLOPRAMIDE HYDROCHLORIDE 5 MG/ML
5 INJECTION INTRAMUSCULAR; INTRAVENOUS
Status: DISCONTINUED | OUTPATIENT
Start: 2019-01-22 | End: 2019-01-22 | Stop reason: HOSPADM

## 2019-01-22 RX ORDER — ACETAMINOPHEN 160 MG/5ML
650 SOLUTION ORAL EVERY 4 HOURS PRN
Status: DISCONTINUED | OUTPATIENT
Start: 2019-01-22 | End: 2019-01-23 | Stop reason: HOSPADM

## 2019-01-22 RX ORDER — PROPOFOL 10 MG/ML
VIAL (ML) INTRAVENOUS AS NEEDED
Status: DISCONTINUED | OUTPATIENT
Start: 2019-01-22 | End: 2019-01-22 | Stop reason: SURG

## 2019-01-22 RX ORDER — BUPIVACAINE HCL/0.9 % NACL/PF 0.1 %
2 PLASTIC BAG, INJECTION (ML) EPIDURAL EVERY 8 HOURS
Status: COMPLETED | OUTPATIENT
Start: 2019-01-22 | End: 2019-01-23

## 2019-01-22 RX ORDER — DIAZEPAM 5 MG/1
5 TABLET ORAL EVERY 6 HOURS PRN
Status: DISCONTINUED | OUTPATIENT
Start: 2019-01-22 | End: 2019-01-23 | Stop reason: HOSPADM

## 2019-01-22 RX ADMIN — SENNOSIDES AND DOCUSATE SODIUM 2 TABLET: 8.6; 5 TABLET ORAL at 20:34

## 2019-01-22 RX ADMIN — MIDAZOLAM HYDROCHLORIDE 2 MG: 1 INJECTION, SOLUTION INTRAMUSCULAR; INTRAVENOUS at 12:04

## 2019-01-22 RX ADMIN — ROCURONIUM BROMIDE 30 MG: 10 INJECTION INTRAVENOUS at 14:26

## 2019-01-22 RX ADMIN — Medication 2 G: at 14:35

## 2019-01-22 RX ADMIN — LIDOCAINE HYDROCHLORIDE 80 MG: 20 INJECTION, SOLUTION INFILTRATION; PERINEURAL at 14:26

## 2019-01-22 RX ADMIN — ACETAMINOPHEN 1000 MG: 500 TABLET, FILM COATED ORAL at 12:04

## 2019-01-22 RX ADMIN — SODIUM CHLORIDE, POTASSIUM CHLORIDE, SODIUM LACTATE AND CALCIUM CHLORIDE 1000 ML: 600; 310; 30; 20 INJECTION, SOLUTION INTRAVENOUS at 10:43

## 2019-01-22 RX ADMIN — LOSARTAN POTASSIUM: 50 TABLET, FILM COATED ORAL at 18:35

## 2019-01-22 RX ADMIN — SODIUM CHLORIDE 2 G: 9 INJECTION, SOLUTION INTRAVENOUS at 20:34

## 2019-01-22 RX ADMIN — Medication 80 MCG: at 14:54

## 2019-01-22 RX ADMIN — FAMOTIDINE 40 MG: 20 TABLET, FILM COATED ORAL at 18:40

## 2019-01-22 RX ADMIN — ONDANSETRON HYDROCHLORIDE 4 MG: 2 SOLUTION INTRAMUSCULAR; INTRAVENOUS at 16:29

## 2019-01-22 RX ADMIN — TAMSULOSIN HYDROCHLORIDE 0.4 MG: 0.4 CAPSULE ORAL at 18:35

## 2019-01-22 RX ADMIN — ROCURONIUM BROMIDE 15 MG: 10 INJECTION INTRAVENOUS at 15:30

## 2019-01-22 RX ADMIN — Medication 4 MG: at 16:29

## 2019-01-22 RX ADMIN — Medication 80 MCG: at 15:11

## 2019-01-22 RX ADMIN — OXYCODONE HYDROCHLORIDE 20 MG: 20 TABLET, FILM COATED, EXTENDED RELEASE ORAL at 20:34

## 2019-01-22 RX ADMIN — ROPIVACAINE HYDROCHLORIDE 20 ML: 5 INJECTION, SOLUTION EPIDURAL; INFILTRATION; PERINEURAL at 12:07

## 2019-01-22 RX ADMIN — PROPOFOL 150 MG: 10 INJECTION, EMULSION INTRAVENOUS at 14:26

## 2019-01-22 RX ADMIN — Medication 320 MCG: at 15:24

## 2019-01-22 RX ADMIN — GLYCOPYRROLATE 0.4 MG: 0.2 INJECTION, SOLUTION INTRAMUSCULAR; INTRAVENOUS at 16:29

## 2019-01-22 RX ADMIN — SODIUM CHLORIDE, POTASSIUM CHLORIDE, SODIUM LACTATE AND CALCIUM CHLORIDE 100 ML/HR: 600; 310; 30; 20 INJECTION, SOLUTION INTRAVENOUS at 18:36

## 2019-01-22 RX ADMIN — Medication 160 MCG: at 15:17

## 2019-01-22 NOTE — ANESTHESIA PROCEDURE NOTES
Peripheral Block    Pre-sedation assessment completed: 1/22/2019 12:04 PM    Patient reassessed immediately prior to procedure    Patient location during procedure: pre-op  Start time: 1/22/2019 12:05 PM  Stop time: 1/22/2019 12:07 PM  Reason for block: procedure for pain, at surgeon's request and post-op pain management  Performed by  Anesthesiologist: Leda Dickens MD  Preanesthetic Checklist  Completed: patient identified, site marked, surgical consent, pre-op evaluation, timeout performed, IV checked, risks and benefits discussed and monitors and equipment checked  Prep:  Pt Position: supine  Sterile barriers:gloves, cap and sterile barriers  Prep: ChloraPrep  Patient monitoring: blood pressure monitoring, continuous pulse oximetry and EKG  Procedure  Sedation:yes  Performed under: MAC  Guidance:ultrasound guided  ULTRASOUND INTERPRETATION.  Using ultrasound guidance a 22 G gauge needle was placed in close proximity to the brachial plexus nerve, at which point, under ultrasound guidance anesthetic was injected in the area of the nerve and spread of the anesthesia was seen on ultrasound in close proximity thereto.  There were no abnormalities seen on ultrasound; a digital image was taken; and the patient tolerated the procedure with no complications. Images:still images obtained    Laterality:right  Block Type:interscalene  Injection Technique:single-shot  Needle Type:echogenic  Needle Gauge:22 G  Resistance on Injection: none  Medications Used: ropivacaine (NAROPIN) injection 0.5 %, 20 mL  Med admintered at 1/22/2019 12:07 PM  Post Assessment  Injection Assessment: negative aspiration for heme, no paresthesia on injection and incremental injection  Patient Tolerance:comfortable throughout block  Complications:no

## 2019-01-22 NOTE — H&P
Pt Name: Roc Rees  MRN: [id number]  YOB: 1955  Date of evaluation: 1/22/2019    H&P including current review of systems was updated in the paper chart and/or the document previously scanned into the record.  There have been no significant changes or new problems since the original evaluation.  The patient's problems continue and indications for contemplated procedure have not changed.    Electronically signed by Donnie Frias MD on 1/22/2019 at 9:47 AM

## 2019-01-22 NOTE — BRIEF OP NOTE
TOTAL SHOULDER REVERSE ARTHROPLASTY  Progress Note    Roc Rees  1/22/2019    Pre-op Diagnosis:   RIGHT SHOULDER PRIMARY OSTEOARTHRITIS       Post-Op Diagnosis Codes:     * Primary osteoarthritis of right shoulder [M19.011]    Procedure/CPT® Codes:  NE RECONSTR TOTAL SHOULDER IMPLANT [07173]    Procedure(s):  RIGHT  REVERSE TOTAL SHOULDER  ARTHROPLASTY    Surgeon(s):  Donnie Frias MD    Anesthesia: General with Block    Staff:   Circulator: Charlotte Leon RN  Scrub Person: Tyrel Bowens  Assistant: Arlin Rojas; Anastasia Shen  Other: Risa Clifton    Estimated Blood Loss: <500ml    Urine Voided: * No values recorded between 1/22/2019  2:22 PM and 1/22/2019  4:30 PM *    Specimens:                None      Drains:      Findings: see op note     Complications: none      Donnie Frias MD     Date: 1/22/2019  Time: 4:38 PM

## 2019-01-22 NOTE — ANESTHESIA PROCEDURE NOTES
Airway  Urgency: elective    Airway not difficult    General Information and Staff    Patient location during procedure: OR  CRNA: Stan Cobian CRNA    Indications and Patient Condition  Indications for airway management: airway protection    Preoxygenated: yes  MILS maintained throughout  Mask difficulty assessment: 1 - vent by mask    Final Airway Details  Final airway type: endotracheal airway      Successful airway: ETT  Cuffed: yes   Successful intubation technique: direct laryngoscopy  Facilitating devices/methods: intubating stylet  Endotracheal tube insertion site: oral  Blade: Lakeisha  Blade size: 4  ETT size (mm): 7.5  Cormack-Lehane Classification: grade I - full view of glottis  Placement verified by: chest auscultation and capnometry   Cuff volume (mL): 8  Measured from: lips  ETT to lips (cm): 21  Number of attempts at approach: 1

## 2019-01-23 VITALS
DIASTOLIC BLOOD PRESSURE: 55 MMHG | RESPIRATION RATE: 19 BRPM | HEART RATE: 77 BPM | SYSTOLIC BLOOD PRESSURE: 95 MMHG | TEMPERATURE: 98.5 F | OXYGEN SATURATION: 92 %

## 2019-01-23 LAB
ANION GAP SERPL CALCULATED.3IONS-SCNC: 10 MMOL/L (ref 4–13)
BUN BLD-MCNC: 26 MG/DL (ref 5–21)
BUN/CREAT SERPL: 26.8 (ref 7–25)
CALCIUM SPEC-SCNC: 8.4 MG/DL (ref 8.4–10.4)
CHLORIDE SERPL-SCNC: 100 MMOL/L (ref 98–110)
CO2 SERPL-SCNC: 26 MMOL/L (ref 24–31)
CREAT BLD-MCNC: 0.97 MG/DL (ref 0.5–1.4)
GFR SERPL CREATININE-BSD FRML MDRD: 78 ML/MIN/1.73
GLUCOSE BLD-MCNC: 104 MG/DL (ref 70–100)
HCT VFR BLD AUTO: 45 % (ref 40–52)
HGB BLD-MCNC: 14 G/DL (ref 14–18)
POTASSIUM BLD-SCNC: 4.3 MMOL/L (ref 3.5–5.3)
SODIUM BLD-SCNC: 136 MMOL/L (ref 135–145)

## 2019-01-23 PROCEDURE — 85014 HEMATOCRIT: CPT | Performed by: ORTHOPAEDIC SURGERY

## 2019-01-23 PROCEDURE — 97165 OT EVAL LOW COMPLEX 30 MIN: CPT | Performed by: OCCUPATIONAL THERAPIST

## 2019-01-23 PROCEDURE — 94799 UNLISTED PULMONARY SVC/PX: CPT

## 2019-01-23 PROCEDURE — 80048 BASIC METABOLIC PNL TOTAL CA: CPT | Performed by: ORTHOPAEDIC SURGERY

## 2019-01-23 PROCEDURE — 85018 HEMOGLOBIN: CPT | Performed by: ORTHOPAEDIC SURGERY

## 2019-01-23 PROCEDURE — 25010000002 ENOXAPARIN PER 10 MG: Performed by: ORTHOPAEDIC SURGERY

## 2019-01-23 PROCEDURE — 25010000002 CEFAZOLIN PER 500 MG: Performed by: ORTHOPAEDIC SURGERY

## 2019-01-23 RX ORDER — OXYCODONE AND ACETAMINOPHEN 10; 325 MG/1; MG/1
TABLET ORAL
Qty: 40 TABLET | Refills: 0 | Status: SHIPPED | OUTPATIENT
Start: 2019-01-23 | End: 2019-06-24

## 2019-01-23 RX ORDER — PSEUDOEPHEDRINE HCL 30 MG
250 TABLET ORAL 2 TIMES DAILY PRN
Qty: 60 EACH | Refills: 0 | Status: SHIPPED | OUTPATIENT
Start: 2019-01-23 | End: 2020-01-23

## 2019-01-23 RX ADMIN — SODIUM CHLORIDE 2 G: 9 INJECTION, SOLUTION INTRAVENOUS at 04:33

## 2019-01-23 RX ADMIN — LOSARTAN POTASSIUM: 50 TABLET, FILM COATED ORAL at 08:52

## 2019-01-23 RX ADMIN — OXYCODONE HYDROCHLORIDE AND ACETAMINOPHEN 2 TABLET: 7.5; 325 TABLET ORAL at 05:47

## 2019-01-23 RX ADMIN — TAMSULOSIN HYDROCHLORIDE 0.4 MG: 0.4 CAPSULE ORAL at 08:52

## 2019-01-23 RX ADMIN — FAMOTIDINE 40 MG: 20 TABLET, FILM COATED ORAL at 08:52

## 2019-01-23 RX ADMIN — ENOXAPARIN SODIUM 30 MG: 30 INJECTION SUBCUTANEOUS at 09:03

## 2019-01-23 RX ADMIN — OXYCODONE HYDROCHLORIDE 20 MG: 20 TABLET, FILM COATED, EXTENDED RELEASE ORAL at 09:03

## 2019-01-23 NOTE — THERAPY DISCHARGE NOTE
Acute Care - Occupational Therapy Initial Eval/Discharge  [facility]     Patient Name: Roc Rees  : 1955  MRN: [id number]  Today's Date: 2019  Onset of Illness/Injury or Date of Surgery: 19  Date of Referral to OT: 19  Referring Physician: Dr. Frias      Admit Date: 2019       ICD-10-CM ICD-9-CM   1. Rotator cuff arthropathy of right shoulder M12.811 716.81   2. Primary osteoarthritis of right shoulder M19.011 715.11     Patient Active Problem List   Diagnosis   • BPH (benign prostatic hypertrophy) with urinary obstruction   • Impotence due to erectile dysfunction   • Hypogonadism, male   • Erectile dysfunction   • Benign prostatic hyperplasia with lower urinary tract symptoms   • Rotator cuff arthropathy of right shoulder   • Primary osteoarthritis of right shoulder     Past Medical History:   Diagnosis Date   • History of transfusion    • Hypertension    • Nephrolithiasis    • Shoulder pain      Past Surgical History:   Procedure Laterality Date   • BELOW KNEE LEG AMPUTATION Right    • LYMPH NODE BIOPSY            OT ASSESSMENT FLOWSHEET (last 72 hours)      Occupational Therapy Evaluation     Row Name 19 0815                   OT Evaluation Time/Intention    Subjective Information  fatigue;pain  -JJ        Document Type  evaluation  -JJ        Mode of Treatment  occupational therapy  -JJ           General Information    Patient Profile Reviewed?  yes  -JJ        Onset of Illness/Injury or Date of Surgery  19  -JJ        Referring Physician  Dr. Frias  -J        Patient Observations  cooperative;alert;agree to therapy  -JJ        Patient/Family Observations  spouse present at bedside  -JJ        General Observations of Patient  sitting up in bed. alert and no apparent distress  -JJ        Prior Level of Function  independent:;all household mobility;community mobility;transfer;bed mobility;ADL's  -JJ        Equipment Currently Used at Home  orthotic device RLE  -JJ         Pertinent History of Current Functional Problem  s/p R TSA on 12/22. Dx: Primary OA of R shoulder   -JJ        Existing Precautions/Restrictions  shoulder;non-weight bearing NWB RUE  -JJ        Limitations/Impairments  hearing  -JJ        Risks Reviewed  patient:;LOB;nausea/vomiting;dizziness;increased discomfort;change in vital signs;increased drainage;lines disloged  -JJ        Benefits Reviewed  patient:;improve function;increase independence;increase strength;increase balance;decrease pain;decrease risk of DVT;improve skin integrity;increase knowledge  -JJ           Relationship/Environment    Primary Source of Support/Comfort  spouse  -JJ        Lives With  spouse  -JJ           Resource/Environmental Concerns    Current Living Arrangements  home/apartment/condo  -JJ        Resource/Environmental Concerns  none  -JJ        Transportation Concerns  car, none  -JJ           Home Main Entrance    Number of Stairs, Main Entrance  three  -JJ        Stair Railings, Main Entrance  none  -JJ           Cognitive Assessment/Interventions    Additional Documentation  Cognitive Assessment/Intervention (Group)  -JJ           Cognitive Assessment/Intervention- PT/OT    Affect/Mental Status (Cognitive)  WNL  -JJ        Orientation Status (Cognition)  oriented x 4  -JJ        Follows Commands (Cognition)  WNL  -JJ        Cognitive Function (Cognitive)  WNL  -JJ        Personal Safety Interventions  fall prevention program maintained;gait belt;muscle strengthening facilitated;nonskid shoes/slippers when out of bed;supervised activity  -JJ           Safety Issues, Functional Mobility    Impairments Affecting Function (Mobility)  range of motion (ROM);strength  -JJ           Mobility Assessment/Treatment    Extremity Weight-bearing Status  right upper extremity  (Significant)   -JJ        Right Upper Extremity (Weight-bearing Status)  non weight-bearing (NWB)  (Significant)   -JJ           Bed Mobility Assessment/Treatment     Bed Mobility Assessment/Treatment  supine-sit  -JJ        Supine-Sit [place] (Bed Mobility)  contact guard;verbal cues  -        Bed Mobility, Safety Issues  decreased use of arms for pushing/pulling  -        Assistive Device (Bed Mobility)  head of bed elevated  -           Functional Mobility    Functional Mobility- Ind. Level  contact guard assist;verbal cues required  -        Functional Mobility- Comment  Amb from bed to nurses station and back to bed with CGA. NOB during amb.   -           Transfer Assessment/Treatment    Transfer Assessment/Treatment  sit-stand transfer;stand-sit transfer  -        Maintains Weight-bearing Status (Transfers)  able to maintain  -           Sit-Stand Transfer    Sit-Stand [place] (Transfers)  contact guard  -J           Stand-Sit Transfer    Stand-Sit [place] (Transfers)  contact guard  -           ADL Assessment/Intervention    BADL Assessment/Intervention  lower body dressing;upper body dressing  -           Upper Body Dressing Assessment/Training    Upper Body Dressing [place] Level  don;front opening garment;moderate assist (50% patient effort)  -        Upper Body Dressing Position  edge of bed sitting  -        Comment (Upper Body Dressing)  Mod A for threading R arm through sleeve.   -           Lower Body Dressing Assessment/Training    Lower Body Dressing [place] Level  don;socks;shoes/slippers;minimum assist (75% patient effort) RLE prosthetic   -        Lower Body Dressing Position  edge of bed sitting  -           BADL Safety/Performance    Impairments, BADL Safety/Performance  range of motion;strength  -J        Skilled BADL Treatment/Intervention  BADL process/adaptation training  -           General ROM    GENERAL ROM COMMENTS  RUE not assessed. LUE and BLE AROM WFL  -           MMT (Manual Muscle Testing)    General MMT Comments  LUE and BLE strength 4+/5, RUE not assessed    -           Motor  Assessment/Interventions    Additional Documentation  Balance (Group)  -           Balance    Balance  static sitting balance;static standing balance;dynamic sitting balance  -           Static Sitting Balance    Level of [place] (Unsupported Sitting, Static Balance)  independent  -        Sitting Position (Unsupported Sitting, Static Balance)  sitting on edge of bed  -           Dynamic Sitting Balance    Level of [place], Reaches Outside Midline (Sitting, Dynamic Balance)  supervision  -        Sitting Position, Reaches Outside Midline (Sitting, Dynamic Balance)  sitting on edge of bed  -           Static Standing Balance    Level of [place] (Supported Standing, Static Balance)  contact guard assist  -           Sensory Assessment/Intervention    Sensory General Assessment  no sensation deficits identified per pt report  -           Positioning and Restraints    Pre-Treatment Position  in bed  -JJ        Post Treatment Position  bed  -        In Bed  sitting EOB;call light within reach;encouraged to call for assist;patient within staff view;with nsg;side rails up x2 R shoulder immobilizer donned  -           Pain Assessment    Additional Documentation  Pain Scale: Numbers Pre/Post-Treatment (Group)  -           Pain Scale: Numbers Pre/Post-Treatment    Pain Scale: Numbers, Pretreatment  2/10  -        Pain Location - Side  Right  -JJ        Pain Location  shoulder  -J        Pain Intervention(s)  Repositioned;Ambulation/increased activity  -           Orthotics & Prosthetics Management    Orthosis Location  upper extremity orthosis  -        Additional Documentation  Orthosis Location (Row)  -           Upper Extremity Orthosis Management    Type (Upper Extremity Orthosis)  shoulder immobilizer  -        Fabrication Comment (Upper Extremity Orthosis)  adjusted during eval for proper fitting.   -        Functional Design (Upper Extremity Orthosis)  static orthosis   -JJ        Therapeutic Indications (Upper Extremity Orthosis)  post-op positioning/protection;rest/inflammation reduction;stabilization and support  -JJ        Wearing Schedule (Upper Extremity Orthosis)  wear full time  -JJ        Orthosis Training (Upper Extremity Orthosis)  patient and caregiver;all orthosis skills  -JJ        Compliance/Wearing Issues (Upper Extremity Orthosis)  patient/caregiver comprehend strategies  -JJ           Wound 01/22/19 1627 Right shoulder incision    Wound - Properties Group Date first assessed: 01/22/19  - Time first assessed: 1627 -LH Side: Right  -LH Location: shoulder  -LH Type: incision  -LH       Wound 01/22/19 1627 Right arm incision    Wound - Properties Group Date first assessed: 01/22/19  - Time first assessed: 1627  -LH Side: Right  -LH Location: arm  -LH Type: incision  -LH       Coping    Verbalized Emotional State  acceptance  -JJ           Plan of Care Review    Plan of Care Reviewed With  patient  -JJ           Clinical Impression (OT)    Date of Referral to OT  01/22/19  -JJ        Prognosis (OT Eval)  good  -JJ        Patient/Family Goals Statement (OT Eval)  return home  -JJ        Criteria for Skilled Therapeutic Interventions Met (OT Eval)  no eval completed on same day as d/c from facility  -JJ        Therapy Frequency (OT Eval)  evaluation only  -JJ        Care Plan Review (OT)  evaluation/treatment results reviewed;care plan/treatment goals reviewed;risks/benefits reviewed;current/potential barriers reviewed;patient/other agree to care plan  -JJ        Care Plan Review, Other Participant (OT Eval)  spouse  -JJ        Anticipated Discharge Disposition (OT)  home with assist  -J           Living Environment    Home Accessibility  stairs to enter home  -JJ          User Key  (r) = Recorded By, (t) = Taken By, (c) = Cosigned By    Initials Name Effective Dates    Dottie Xiong, HOLLYR/BHAVIN 10/12/18 -     LH Charlotte Leon RN 11/23/18 -            Occupational Therapy Education     Title: PT OT SLP Therapies (In Progress)     Topic: Occupational Therapy (In Progress)     Point: ADL training (Done)     Description: Instruct learner(s) on proper safety adaptation and remediation techniques during self care or transfers.   Instruct in proper use of assistive devices.    Learning Progress Summary           Patient Acceptance, E, VU by ONEYDA at 1/23/2019 10:40 AM    Comment:  Pt educated on the benefits and roles of OT, shoulder immobilizer fiiting/mgt/wear schedule, shoulder precuations, adl modifications, t/fs, bed mobility.                   Point: Precautions (Done)     Description: Instruct learner(s) on prescribed precautions during self-care and functional transfers.    Learning Progress Summary           Patient Acceptance, E, VU by ONEYDA at 1/23/2019 10:40 AM    Comment:  Pt educated on the benefits and roles of OT, shoulder immobilizer fiiting/mgt/wear schedule, shoulder precuations, adl modifications, t/fs, bed mobility.                               User Key     Initials Effective Dates Name Provider Type Discipline    ONEYDA 10/12/18 -  Dottie Dickens OTR/L Occupational Therapist OT                OT Recommendation and Plan  Outcome Summary/Treatment Plan (OT)  Anticipated Discharge Disposition (OT): home with assist  Therapy Frequency (OT Eval): evaluation only  Plan of Care Review  Plan of Care Reviewed With: patient  Plan of Care Reviewed With: patient  Outcome Summary: OT eval completed. Pt reports being independent at baseline with all adls, iadls and mobility. 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 Pt educated on shoulder immobilizer fitting/mgt/wear schedule, shoulder precautions, and adl modifications. Pt completed Mod A for donning shirt and Max A for donning shoulder immobilizer. Pt Min A for donning socks, pants and RLE prosthetic. Pt amb from bed to nurses station and back to bed with CGA. Pt d/c from facility on same day as eval  occurred. Anticipate home with assist. OT to sign off.          Outcome Measures     Row Name 01/23/19 1000             How much help from another is currently needed...    Putting on and taking off regular lower body clothing?  3  -JJ      Bathing (including washing, rinsing, and drying)  3  -JJ      Toileting (which includes using toilet bed pan or urinal)  3  -JJ      Putting on and taking off regular upper body clothing  2  -JJ      Taking care of personal grooming (such as brushing teeth)  3  -JJ      Eating meals  4  -JJ      Score  18  -         Functional Assessment    Outcome Measure Options  AM-PAC 6 Clicks Daily Activity (OT)  -        User Key  (r) = Recorded By, (t) = Taken By, (c) = Cosigned By    Initials Name Provider Type    Dottie Xiong OTR/L Occupational Therapist          Time Calculation:   Time Calculation- OT     Row Name 01/23/19 1034             Time Calculation- OT    OT Start Time  0809  -      OT Stop Time  0849  -      OT Time Calculation (min)  40 min  -      OT Received On  01/23/19  -        User Key  (r) = Recorded By, (t) = Taken By, (c) = Cosigned By    Initials Name Provider Type    Dottie Xiong OTR/L Occupational Therapist        Therapy Suggested Charges     Code   Minutes Charges    None           Therapy Charges for Today     Code Description Service Date Service Provider Modifiers Qty    [id number]  OT EVAL LOW COMPLEXITY 3 1/23/2019 Dottie Dickens OTR/L GO 1               OT Discharge Summary  Anticipated Discharge Disposition (OT): home with assist  Reason for Discharge: Discharge from facility  Outcomes Achieved: Discharge from facility occurred on same date as evluation  Discharge Destination: Home with assist    BEV Manuel  1/23/2019

## 2019-01-23 NOTE — PLAN OF CARE
Problem: Patient Care Overview  Goal: Plan of Care Review  Outcome: Ongoing (interventions implemented as appropriate)   01/23/19 1041   Coping/Psychosocial   Plan of Care Reviewed With patient   Plan of Care Review   Progress improving   OTHER   Outcome Summary OT eval completed. Pt reports being independent at baseline with all adls, iadls and mobility. 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 Pt educated on shoulder immobilizer fitting/mgt/wear schedule, shoulder precautions, and adl modifications. Pt completed Mod A for donning shirt and Max A for donning shoulder immobilizer. Pt Min A for donning socks, pants and RLE prosthetic. Pt amb from bed to nurses station and back to bed with CGA. Pt d/c from facility on same day as eval occurred. Anticipate home with assist. OT to sign off.

## 2019-01-23 NOTE — ANESTHESIA POSTPROCEDURE EVALUATION
Patient: Roc Rees    Procedure Summary     Date:  01/22/19 Room / Location:   [facility] OR  /  [facility]    Anesthesia Start:  1422 Anesthesia Stop:  1641    Procedure:  RIGHT  REVERSE TOTAL SHOULDER  ARTHROPLASTY (Right Shoulder) Diagnosis:       Primary osteoarthritis of right shoulder      (RIGHT SHOULDER PRIMARY OSTEOARTHRITIS)    Surgeon:  Donnie Frias MD Provider:  Stan Cobian CRNA    Anesthesia Type:  general with block ASA Status:  2          Anesthesia Type: general with block  Last vitals  BP   117/86 (01/22/19 1712)   Temp   98.2 °F (36.8 °C) (01/22/19 1712)   Pulse   76 (01/22/19 1712)   Resp   15 (01/22/19 1712)     SpO2   92 % (01/22/19 1712)     Post Anesthesia Care and Evaluation    Patient location during evaluation: PACU  Patient participation: complete - patient participated  Level of consciousness: awake and alert  Pain management: adequate  Airway patency: patent  Anesthetic complications: No anesthetic complications    Cardiovascular status: acceptable  Respiratory status: acceptable  Hydration status: acceptable    Comments: Blood pressure 117/86, pulse 76, temperature 98.2 °F (36.8 °C), temperature source Temporal, resp. rate 15, SpO2 92 %.    Pt discharged from PACU based on cira score >8

## 2019-01-23 NOTE — PLAN OF CARE
Problem: Patient Care Overview  Goal: Plan of Care Review  Outcome: Ongoing (interventions implemented as appropriate)   01/23/19 0149   Coping/Psychosocial   Plan of Care Reviewed With patient   Plan of Care Review   Progress improving   OTHER   Outcome Summary rested well ice pack to right shoulder. denies pain. noted increased bruising to inc site. wife at bedside. urinal without difficulty      Goal: Individualization and Mutuality  Outcome: Ongoing (interventions implemented as appropriate)    Goal: Discharge Needs Assessment  Outcome: Ongoing (interventions implemented as appropriate)    Goal: Interprofessional Rounds/Family Conf  Outcome: Ongoing (interventions implemented as appropriate)      Problem: Shoulder Arthroplasty (Adult)  Goal: Signs and Symptoms of Listed Potential Problems Will be Absent, Minimized or Managed (Shoulder Arthroplasty)  Outcome: Ongoing (interventions implemented as appropriate)    Goal: Anesthesia/Sedation Recovery  Outcome: Ongoing (interventions implemented as appropriate)

## 2019-01-23 NOTE — PLAN OF CARE
Problem: Patient Care Overview  Goal: Plan of Care Review  Outcome: Ongoing (interventions implemented as appropriate)   01/23/19 0928   Coping/Psychosocial   Plan of Care Reviewed With patient   Plan of Care Review   Progress improving   OTHER   Outcome Summary VSS. Pain treated with prn meds. armpit area is very bruised. saftey maintained. pt being DC home. Arm immoblizer on.      Goal: Individualization and Mutuality  Outcome: Ongoing (interventions implemented as appropriate)    Goal: Discharge Needs Assessment  Outcome: Ongoing (interventions implemented as appropriate)    Goal: Interprofessional Rounds/Family Conf  Outcome: Ongoing (interventions implemented as appropriate)      Problem: Shoulder Arthroplasty (Adult)  Goal: Signs and Symptoms of Listed Potential Problems Will be Absent, Minimized or Managed (Shoulder Arthroplasty)  Outcome: Ongoing (interventions implemented as appropriate)    Goal: Anesthesia/Sedation Recovery  Outcome: Ongoing (interventions implemented as appropriate)

## 2019-01-23 NOTE — DISCHARGE SUMMARY
NAME: Roc Rees  : 1955  MRN: [id number]      Admission Date: 2019    Discharge Date: 19    Final Diagnoses: Primary osteoarthritis of right shoulder [M19.011]    Procedures: R reverse TSA    Consultations: none    Reason for Admission: 63 y.o. male with progressive loss of function and increasing pain of the upper extremity due to severe end stage osteoarthritis associated with a diseased and dysfunctional rotator cuff. Due to loss of function and progressive pain, a reverse shoulder arthroplasty is planned to improve function and decrease pain. Surgical evaluation was discussed and the patient wished to proceed understanding risks, benefits, and alternatives.        Hospital Course:  The patient was admitted with the above named diagnosis, surgery was performed and tolerated well.  At the time of discharge, the patient was afebrile, vitals stable, pain was controlled with oral medication, they were tolerating a by mouth diet, and voiding appropriately. Physical therapy and occupational therapy were consulted. Given these findings they were deemed suitable to be discharged.    Disposition: Home    Activity: NWRUE    Wound Instructions: see postop instructions    Diet: regular    Resume home meds:   Prior to Admission medications    Medication Sig Start Date End Date Taking? Authorizing Provider   aspirin 81 MG EC tablet Take 81 mg by mouth Daily.   Yes ProviderGwendolyn MD   losartan-hydrochlorothiazide (HYZAAR) 100-12.5 MG per tablet Take 1 tablet by mouth Daily.   Yes ProviderGwendolyn MD   tamsulosin (FLOMAX) 0.4 MG capsule 24 hr capsule Take 1 capsule by mouth Daily. 18  Yes Tyrel Hilario MD   docusate sodium 250 MG capsule Take 250 mg by mouth 2 (Two) Times a Day As Needed (constipation). 19  Donnie Frias MD   oxyCODONE-acetaminophen (PERCOCET)  MG per tablet 1-2 tabs po q4-6 hrs prn pain 19   Donnie Frias MD       Prescriptions  for:  Percocet 10/325, #40    Return to Clinic: 1 week    Xrays: yes

## 2019-01-24 NOTE — PAYOR COMM NOTE
"[place] [facility] 19  [id number]    Roc Rees (63 y.o. Male)     Date of Birth Social Security Number Address Home Phone MRN    1955  [address] [place] [facility] [address] [phone] [id number]    [employer] Status          [employer]        Admission Date Admission Type Admitting Provider Attending Provider Department, Room/Bed    19 Elective Donnie Frias MD  [facility] 3A, 328/1    Discharge Date Discharge Disposition Discharge Destination        2019 Home or Self Care              Attending Provider:  (none)   Allergies:  No Known Allergies    Isolation:  None   Infection:  None   Code Status:  Prior    Ht:  178.5 cm (70.28\")   Wt:  117 kg (257 lb 15 oz)    Admission Cmt:  None   Principal Problem:  Rotator cuff arthropathy of right shoulder [M12.811]                 Active Insurance as of 2019     Primary Coverage     Payor Plan Insurance Group Employer/Plan Group    MEDICARE MEDICARE A & B      Payor Plan Address Payor Plan Phone Number Payor Plan Fax Number Effective Dates    [address] [phone]  1988 - None Entered    [facility] [address]       Subscriber Name Subscriber Birth Date Member ID       ROC REES 1955 [id number]           Secondary Coverage     Payor Plan Insurance Group Employer/Plan Group    [facility] [id number]     Payor Plan Address Payor Plan Phone Number Payor Plan Fax Number Effective Dates    [address] [address]   1997 - None Entered    [facility] [address]       Subscriber Name Subscriber Birth Date Member ID       ROC REES 1955 [id number]                 Emergency Contacts      (Rel.) Home Phone Work Phone Mobile Phone    Anjana Rees (Spouse) [phone] -- [phone]               Discharge Summary      Donnie Frias MD at 2019  7:40 AM          NAME: Roc Rees  : 1955  MRN: [id number]      Admission Date: 2019    Discharge Date: 19    Final Diagnoses: " Primary osteoarthritis of right shoulder [M19.011]    Procedures: R reverse TSA    Consultations: none    Reason for Admission: 63 y.o. male with progressive loss of function and increasing pain of the upper extremity due to severe end stage osteoarthritis associated with a diseased and dysfunctional rotator cuff. Due to loss of function and progressive pain, a reverse shoulder arthroplasty is planned to improve function and decrease pain. Surgical evaluation was discussed and the patient wished to proceed understanding risks, benefits, and alternatives.        Hospital Course:  The patient was admitted with the above named diagnosis, surgery was performed and tolerated well.  At the time of discharge, the patient was afebrile, vitals stable, pain was controlled with oral medication, they were tolerating a by mouth diet, and voiding appropriately. Physical therapy and occupational therapy were consulted. Given these findings they were deemed suitable to be discharged.    Disposition: Home    Activity: NWRUE    Wound Instructions: see postop instructions    Diet: regular    Resume home meds:   Prior to Admission medications    Medication Sig Start Date End Date Taking? Authorizing Provider   aspirin 81 MG EC tablet Take 81 mg by mouth Daily.   Yes ProviderGwendolyn MD   losartan-hydrochlorothiazide (HYZAAR) 100-12.5 MG per tablet Take 1 tablet by mouth Daily.   Yes ProviderGwendolyn MD   tamsulosin (FLOMAX) 0.4 MG capsule 24 hr capsule Take 1 capsule by mouth Daily. 4/17/18  Yes Tyrel Hilario MD   docusate sodium 250 MG capsule Take 250 mg by mouth 2 (Two) Times a Day As Needed (constipation). 1/23/19 1/23/20  Donnie Frias MD   oxyCODONE-acetaminophen (PERCOCET)  MG per tablet 1-2 tabs po q4-6 hrs prn pain 1/23/19   Donnie Frias MD       Prescriptions for:  Percocet 10/325, #40    Return to Clinic: 1 week    Xrays: yes              Electronically signed by Donnie Frias  MD James at 1/23/2019  7:40 AM

## 2019-04-09 DIAGNOSIS — N40.1 BENIGN PROSTATIC HYPERPLASIA WITH LOWER URINARY TRACT SYMPTOMS, SYMPTOM DETAILS UNSPECIFIED: ICD-10-CM

## 2019-04-09 DIAGNOSIS — E29.1 HYPOGONADISM IN MALE: ICD-10-CM

## 2019-04-10 LAB
ALBUMIN SERPL-MCNC: 4.5 G/DL (ref 3.5–5.2)
ALBUMIN/GLOB SERPL: 1.7 G/DL
ALP SERPL-CCNC: 51 U/L (ref 39–117)
ALT SERPL-CCNC: 23 U/L (ref 1–41)
AST SERPL-CCNC: 15 U/L (ref 1–40)
BASOPHILS # BLD AUTO: 0.04 10*3/MM3 (ref 0–0.2)
BASOPHILS NFR BLD AUTO: 0.7 % (ref 0–1.5)
BILIRUB SERPL-MCNC: 0.3 MG/DL (ref 0.2–1.2)
BUN SERPL-MCNC: 20 MG/DL (ref 8–23)
BUN/CREAT SERPL: 21.5 (ref 7–25)
CALCIUM SERPL-MCNC: 9.3 MG/DL (ref 8.6–10.5)
CHLORIDE SERPL-SCNC: 101 MMOL/L (ref 98–107)
CO2 SERPL-SCNC: 28.2 MMOL/L (ref 22–29)
CREAT SERPL-MCNC: 0.93 MG/DL (ref 0.76–1.27)
EOSINOPHIL # BLD AUTO: 0.53 10*3/MM3 (ref 0–0.4)
EOSINOPHIL NFR BLD AUTO: 9.4 % (ref 0.3–6.2)
ERYTHROCYTE [DISTWIDTH] IN BLOOD BY AUTOMATED COUNT: 14.6 % (ref 12.3–15.4)
GLOBULIN SER CALC-MCNC: 2.7 GM/DL
GLUCOSE SERPL-MCNC: 106 MG/DL (ref 65–99)
HCT VFR BLD AUTO: 52.7 % (ref 37.5–51)
HGB BLD-MCNC: 15.5 G/DL (ref 13–17.7)
IMM GRANULOCYTES # BLD AUTO: 0.01 10*3/MM3 (ref 0–0.05)
IMM GRANULOCYTES NFR BLD AUTO: 0.2 % (ref 0–0.5)
LYMPHOCYTES # BLD AUTO: 1.67 10*3/MM3 (ref 0.7–3.1)
LYMPHOCYTES NFR BLD AUTO: 29.8 % (ref 19.6–45.3)
MCH RBC QN AUTO: 25.3 PG (ref 26.6–33)
MCHC RBC AUTO-ENTMCNC: 29.4 G/DL (ref 31.5–35.7)
MCV RBC AUTO: 86.1 FL (ref 79–97)
MONOCYTES # BLD AUTO: 0.63 10*3/MM3 (ref 0.1–0.9)
MONOCYTES NFR BLD AUTO: 11.2 % (ref 5–12)
NEUTROPHILS # BLD AUTO: 2.73 10*3/MM3 (ref 1.4–7)
NEUTROPHILS NFR BLD AUTO: 48.7 % (ref 42.7–76)
NRBC BLD AUTO-RTO: 0 /100 WBC (ref 0–0)
PLATELET # BLD AUTO: 371 10*3/MM3 (ref 140–450)
POTASSIUM SERPL-SCNC: 4.3 MMOL/L (ref 3.5–5.2)
PROT SERPL-MCNC: 7.2 G/DL (ref 6–8.5)
PSA SERPL-MCNC: 0.69 NG/ML (ref 0–4)
RBC # BLD AUTO: 6.12 10*6/MM3 (ref 4.14–5.8)
SODIUM SERPL-SCNC: 140 MMOL/L (ref 136–145)
TESTOST SERPL-MCNC: 676 NG/DL (ref 264–916)
WBC # BLD AUTO: 5.61 10*3/MM3 (ref 3.4–10.8)

## 2019-04-16 ENCOUNTER — OFFICE VISIT (OUTPATIENT)
Dept: UROLOGY | Facility: CLINIC | Age: 64
End: 2019-04-16

## 2019-04-16 VITALS — WEIGHT: 250 LBS | TEMPERATURE: 97.6 F | BODY MASS INDEX: 35.79 KG/M2 | HEIGHT: 70 IN

## 2019-04-16 DIAGNOSIS — R31.29 MICROSCOPIC HEMATURIA: ICD-10-CM

## 2019-04-16 DIAGNOSIS — E29.1 HYPOGONADISM IN MALE: ICD-10-CM

## 2019-04-16 DIAGNOSIS — N40.1 BENIGN PROSTATIC HYPERPLASIA WITH LOWER URINARY TRACT SYMPTOMS, SYMPTOM DETAILS UNSPECIFIED: Primary | ICD-10-CM

## 2019-04-16 LAB
BILIRUB BLD-MCNC: NEGATIVE MG/DL
CLARITY, POC: CLEAR
COLOR UR: YELLOW
GLUCOSE UR STRIP-MCNC: NEGATIVE MG/DL
KETONES UR QL: NEGATIVE
LEUKOCYTE EST, POC: ABNORMAL
NITRITE UR-MCNC: NEGATIVE MG/ML
PH UR: 6.5 [PH] (ref 5–8)
PROT UR STRIP-MCNC: ABNORMAL MG/DL
RBC # UR STRIP: ABNORMAL /UL
SP GR UR: 1.02 (ref 1–1.03)
UROBILINOGEN UR QL: NORMAL

## 2019-04-16 PROCEDURE — 99214 OFFICE O/P EST MOD 30 MIN: CPT | Performed by: UROLOGY

## 2019-04-16 PROCEDURE — 81001 URINALYSIS AUTO W/SCOPE: CPT | Performed by: UROLOGY

## 2019-04-16 RX ORDER — VARDENAFIL HYDROCHLORIDE 20 MG/1
20 TABLET ORAL AS NEEDED
Qty: 20 TABLET | Refills: 1 | Status: SHIPPED | OUTPATIENT
Start: 2019-04-16 | End: 2019-06-24 | Stop reason: SDUPTHER

## 2019-04-16 RX ORDER — TAMSULOSIN HYDROCHLORIDE 0.4 MG/1
1 CAPSULE ORAL DAILY
Qty: 90 CAPSULE | Refills: 5 | Status: SHIPPED | OUTPATIENT
Start: 2019-04-16 | End: 2020-07-06 | Stop reason: SDUPTHER

## 2019-04-16 NOTE — PATIENT INSTRUCTIONS

## 2019-04-16 NOTE — PROGRESS NOTES
Subjective    Mr. Rees is 63 y.o. male    CHIEF COMPLAINT: Hypogonadism    Patient comes back today for follow-up of hypogonadism he is actually doing okay he was off his testosterone for a while when he has recently sold shoulder surgery but is been back on now his testosterone 676.    His hemoglobin is okay his hematocrit slightly elevated again he does donate blood at all he has not recently because of his recent surgery.    Otherwise his laboratory work looks okay.    I also has rather significant BPH symptoms with AUA score today of 20 he does take Flomax on a daily basis.  His PSA was normal at 0.7 and his AUA score today is 20    The patient also has a new diagnosis which is microscopic hematuria he denies any symptoms such as flank pain no gross hematuria no increase in his dysuria frequency urgency etc. so again this is a new symptom he is not had a previous workup for this      History of Present Illness      The following portions of the patient's history were reviewed and updated as appropriate: allergies, current medications, past family history, past medical history, past social history, past surgical history and problem list.    Review of Systems   Constitutional: Negative.  Negative for chills and fever.   Gastrointestinal: Negative for abdominal distention, abdominal pain, anal bleeding, blood in stool and nausea.   Genitourinary: Positive for difficulty urinating, frequency and urgency. Negative for dysuria, flank pain and hematuria.   Psychiatric/Behavioral: Negative.  Negative for agitation and confusion.         Current Outpatient Medications:   •  aspirin 81 MG EC tablet, Take 81 mg by mouth Daily., Disp: , Rfl:   •  docusate sodium 250 MG capsule, Take 250 mg by mouth 2 (Two) Times a Day As Needed (constipation)., Disp: 60 each, Rfl: 0  •  losartan-hydrochlorothiazide (HYZAAR) 100-12.5 MG per tablet, Take 1 tablet by mouth Daily., Disp: , Rfl:   •  oxyCODONE-acetaminophen (PERCOCET)  MG  "per tablet, 1-2 tabs po q4-6 hrs prn pain, Disp: 40 tablet, Rfl: 0  •  tamsulosin (FLOMAX) 0.4 MG capsule 24 hr capsule, Take 1 capsule by mouth Daily., Disp: 90 capsule, Rfl: 5    Past Medical History:   Diagnosis Date   • History of transfusion 1986   • Hypertension    • Nephrolithiasis    • Shoulder pain        Past Surgical History:   Procedure Laterality Date   • BELOW KNEE LEG AMPUTATION Right    • LYMPH NODE BIOPSY     • TOTAL SHOULDER ARTHROPLASTY W/ DISTAL CLAVICLE EXCISION Right 1/22/2019    Procedure: RIGHT  REVERSE TOTAL SHOULDER  ARTHROPLASTY;  Surgeon: Donnie Frias MD;  Location: [facility] OR;  Service: Orthopedics       Social History     Socioeconomic History   • Marital status:      Spouse name: Not on file   • Number of children: Not on file   • Years of education: Not on file   • Highest education level: Not on file   Tobacco Use   • Smoking status: Current Some Day Smoker   • Smokeless tobacco: Never Used   • Tobacco comment: occassionally smokes trying to quit as of 4/16/2019   Substance and Sexual Activity   • Alcohol use: No   • Drug use: No   • Sexual activity: Defer       Family History   Problem Relation Age of Onset   • Kidney cancer Father    • No Known Problems Mother        Objective    Temp 97.6 °F (36.4 °C)   Ht 178.5 cm (70.28\")   Wt 113 kg (250 lb)   BMI 35.59 kg/m²     Physical Exam      Orders Only on 04/09/2019   Component Date Value Ref Range Status   • Glucose 04/09/2019 106* 65 - 99 mg/dL Final   • BUN 04/09/2019 20  8 - 23 mg/dL Final   • Creatinine 04/09/2019 0.93  0.76 - 1.27 mg/dL Final   • eGFR Non African Am 04/09/2019 82  >60 mL/min/1.73 Final   • eGFR African Am 04/09/2019 100  >60 mL/min/1.73 Final   • BUN/Creatinine Ratio 04/09/2019 21.5  7.0 - 25.0 Final   • Sodium 04/09/2019 140  136 - 145 mmol/L Final   • Potassium 04/09/2019 4.3  3.5 - 5.2 mmol/L Final   • Chloride 04/09/2019 101  98 - 107 mmol/L Final   • Total CO2 04/09/2019 28.2  22.0 - 29.0 " mmol/L Final   • Calcium 04/09/2019 9.3  8.6 - 10.5 mg/dL Final   • Total Protein 04/09/2019 7.2  6.0 - 8.5 g/dL Final   • Albumin 04/09/2019 4.50  3.50 - 5.20 g/dL Final   • Globulin 04/09/2019 2.7  gm/dL Final   • A/G Ratio 04/09/2019 1.7  g/dL Final   • Total Bilirubin 04/09/2019 0.3  0.2 - 1.2 mg/dL Final   • Alkaline Phosphatase 04/09/2019 51  39 - 117 U/L Final   • AST (SGOT) 04/09/2019 15  1 - 40 U/L Final   • ALT (SGPT) 04/09/2019 23  1 - 41 U/L Final   • Testosterone, Total 04/09/2019 676  264 - 916 ng/dL Final    Comment: Adult male reference interval is based on a population of  healthy nonobese males (BMI <30) between 19 and 39 years old.  Edward et.al. JCEM 2017,102;9513-8553. PMID: [id number].     • WBC 04/09/2019 5.61  3.40 - 10.80 10*3/mm3 Final   • RBC 04/09/2019 6.12* 4.14 - 5.80 10*6/mm3 Final   • Hemoglobin 04/09/2019 15.5  13.0 - 17.7 g/dL Final   • Hematocrit 04/09/2019 52.7* 37.5 - 51.0 % Final   • MCV 04/09/2019 86.1  79.0 - 97.0 fL Final   • MCH 04/09/2019 25.3* 26.6 - 33.0 pg Final   • MCHC 04/09/2019 29.4* 31.5 - 35.7 g/dL Final   • RDW 04/09/2019 14.6  12.3 - 15.4 % Final   • Platelets 04/09/2019 371  140 - 450 10*3/mm3 Final   • Neutrophil Rel % 04/09/2019 48.7  42.7 - 76.0 % Final   • Lymphocyte Rel % 04/09/2019 29.8  19.6 - 45.3 % Final   • Monocyte Rel % 04/09/2019 11.2  5.0 - 12.0 % Final   • Eosinophil Rel % 04/09/2019 9.4* 0.3 - 6.2 % Final   • Basophil Rel % 04/09/2019 0.7  0.0 - 1.5 % Final   • Neutrophils Absolute 04/09/2019 2.73  1.40 - 7.00 10*3/mm3 Final   • Lymphocytes Absolute 04/09/2019 1.67  0.70 - 3.10 10*3/mm3 Final   • Monocytes Absolute 04/09/2019 0.63  0.10 - 0.90 10*3/mm3 Final   • Eosinophils Absolute 04/09/2019 0.53* 0.00 - 0.40 10*3/mm3 Final   • Basophils Absolute 04/09/2019 0.04  0.00 - 0.20 10*3/mm3 Final   • Immature Granulocyte Rel % 04/09/2019 0.2  0.0 - 0.5 % Final   • Immature Grans Absolute 04/09/2019 0.01  0.00 - 0.05 10*3/mm3 Final   • nRBC 04/09/2019  0.0  0.0 - 0.0 /100 WBC Final   • PSA 04/09/2019 0.691  0.000 - 4.000 ng/mL Final       Results for orders placed or performed in visit on 04/16/19   POC Urinalysis Dipstick, Multipro   Result Value Ref Range    Color Yellow Yellow, Straw, Dark Yellow, Linda    Clarity, UA Clear Clear    Glucose, UA Negative Negative, 1000 mg/dL (3+) mg/dL    Bilirubin Negative Negative    Ketones, UA Negative Negative    Specific Gravity  1.020 1.005 - 1.030    Blood, UA Large (A) Negative    pH, Urine 6.5 5.0 - 8.0    Protein, POC Trace (A) Negative mg/dL    Urobilinogen, UA Normal Normal    Nitrite, UA Negative Negative    Leukocytes Trace (A) Negative   Microscopic Urinalysis  I inspected the urine myself based on the clinical situation including the dipstick urine. The urine is spun in a centrifuge for three minutes. The spun urine shows 12-20 rbc/hpf, 0-2 wbc/hpf, none epi/hpf, negative bacteria, negative crystals, and negative casts.     Patient's Body mass index is 35.59 kg/m². BMI is above normal parameters. Recommendations include: educational material.  Assessment and Plan    Diagnoses and all orders for this visit:    Benign prostatic hyperplasia with lower urinary tract symptoms, symptom details unspecified  -     POC Urinalysis Dipstick, Multipro    Hypogonadism in male  -     POC Urinalysis Dipstick, Multipro    Microscopic hematuria  -     CT Abdomen Pelvis With & Without Contrast; Future    Plan--I discussed hypogonadism with the patient again he seems to be doing okay from that point of view unfortunately he does not.    Unfortunately he does have a new diagnosis which is microscopic hematuria he does have history of smoking I would work this up with a CT urogram and a cystoscopy later this week    His BPH symptoms are stable and we can also evaluated his prostate the time of cystoscopy

## 2019-04-18 ENCOUNTER — PREP FOR SURGERY (OUTPATIENT)
Dept: OTHER | Facility: HOSPITAL | Age: 64
End: 2019-04-18

## 2019-04-18 ENCOUNTER — HOSPITAL ENCOUNTER (OUTPATIENT)
Dept: CT IMAGING | Facility: HOSPITAL | Age: 64
Discharge: HOME | End: 2019-04-18
Admitting: UROLOGY

## 2019-04-18 ENCOUNTER — OFFICE VISIT (OUTPATIENT)
Dept: UROLOGY | Facility: CLINIC | Age: 64
End: 2019-04-18

## 2019-04-18 VITALS — HEIGHT: 70 IN | TEMPERATURE: 97.8 F | BODY MASS INDEX: 35.79 KG/M2 | WEIGHT: 250 LBS

## 2019-04-18 DIAGNOSIS — E29.1 HYPOGONADISM IN MALE: ICD-10-CM

## 2019-04-18 DIAGNOSIS — R31.29 MICROSCOPIC HEMATURIA: ICD-10-CM

## 2019-04-18 DIAGNOSIS — N40.1 BENIGN PROSTATIC HYPERPLASIA WITH LOWER URINARY TRACT SYMPTOMS, SYMPTOM DETAILS UNSPECIFIED: ICD-10-CM

## 2019-04-18 DIAGNOSIS — N28.1 RENAL CYST: ICD-10-CM

## 2019-04-18 DIAGNOSIS — N20.0 RENAL STONE: ICD-10-CM

## 2019-04-18 DIAGNOSIS — N13.2 HYDRONEPHROSIS WITH RENAL AND URETERAL CALCULUS OBSTRUCTION: Primary | ICD-10-CM

## 2019-04-18 DIAGNOSIS — N20.1 URETERAL STONE: ICD-10-CM

## 2019-04-18 LAB
BILIRUB BLD-MCNC: NEGATIVE MG/DL
CLARITY, POC: CLEAR
COLOR UR: YELLOW
CREAT BLDA-MCNC: 1 MG/DL (ref 0.6–1.3)
GLUCOSE UR STRIP-MCNC: NEGATIVE MG/DL
KETONES UR QL: NEGATIVE
LEUKOCYTE EST, POC: NEGATIVE
NITRITE UR-MCNC: NEGATIVE MG/ML
PH UR: 7 [PH] (ref 5–8)
PROT UR STRIP-MCNC: ABNORMAL MG/DL
RBC # UR STRIP: ABNORMAL /UL
SP GR UR: 1.01 (ref 1–1.03)
UROBILINOGEN UR QL: NORMAL

## 2019-04-18 PROCEDURE — 99214 OFFICE O/P EST MOD 30 MIN: CPT | Performed by: UROLOGY

## 2019-04-18 PROCEDURE — 82565 ASSAY OF CREATININE: CPT

## 2019-04-18 PROCEDURE — 74178 CT ABD&PLV WO CNTR FLWD CNTR: CPT

## 2019-04-18 PROCEDURE — 81001 URINALYSIS AUTO W/SCOPE: CPT | Performed by: UROLOGY

## 2019-04-18 PROCEDURE — 25010000002 IOPAMIDOL 61 % SOLUTION: Performed by: UROLOGY

## 2019-04-18 RX ORDER — BUPIVACAINE HCL/0.9 % NACL/PF 0.1 %
2 PLASTIC BAG, INJECTION (ML) EPIDURAL ONCE
Status: CANCELLED | OUTPATIENT
Start: 2019-04-18 | End: 2019-04-18

## 2019-04-18 RX ADMIN — IOPAMIDOL 100 ML: 612 INJECTION, SOLUTION INTRAVENOUS at 09:03

## 2019-04-18 NOTE — PATIENT INSTRUCTIONS

## 2019-04-18 NOTE — PROGRESS NOTES
Subjective    Mr. Rees is 63 y.o. male    CHIEF COMPLAINT: Microscopic hematuria    The patient comes back today for follow-up of microscopic hematuria interestingly enough on his CT scan he has a large stone down the left distal ureter with some mild hydro-he also has bilateral renal calculi he has had also has a large simple cyst all of these are new findings.    He has never had stones before he did indicate now that he thinks about it he did have an episode of pain in the left lower quadrant, left flank a little bit of bowel discomfort and nausea for a few days and the neck pretty much resolved get is not had any previous operations for stones etc.    CT independent reivew    The CT scan of the abdomen/pelvis done with and without contrast is available for me to review.  Treatment recommendations require an independent review.  First I scanned the liver, spleen, and bowel pattern.  The retroperitoneum including the major vessels and lymphatic packages are briefly reviewed.  This film as been reviewed by the radiologist to determine any non urologic abnormalities that are present.  The kidneys are closely inspected for size, symmetry, contour, parenchymal thickness, perinephric reaction, presence of calcifications, and intrarenal dilation of the collecting system.  The ureters are inspected for their course, caliber, and any calcifications.  The bladder is inspected for its thickness, size, and presence of any calcifications.  This scan shows:    The right kidney appears abnormal on this contrasted CT scan.   Normal parenchymal thickness, No hydronephrosis, No hydroureter, No renal masses, Single renal cyst measuring 3cm mm and Single renal stone measuring 8 mm    The left kidney appears abnormal on this contrasted CT scan.   Normal parenchymal thickness, No renal masses, No renal cysts, mild hydronephrosis, mild hydroureter, Mulltiple renal stones measuring 4 and Ureteral stone measuring 1.2mm    The bladder  appears normal on this non-contrasted CT scan.  The bladder appears normal in thickness.  There no masses or stones seen on this exam.      History of Present Illness      The following portions of the patient's history were reviewed and updated as appropriate: allergies, current medications, past family history, past medical history, past social history, past surgical history and problem list.    Review of Systems   Constitutional: Negative.  Negative for chills and fever.   Gastrointestinal: Negative for abdominal distention, abdominal pain, anal bleeding, blood in stool and nausea.   Genitourinary: Positive for difficulty urinating and frequency. Negative for dysuria, flank pain, hematuria and urgency.   Psychiatric/Behavioral: Negative.  Negative for agitation and confusion.         Current Outpatient Medications:   •  aspirin 81 MG EC tablet, Take 81 mg by mouth Daily., Disp: , Rfl:   •  docusate sodium 250 MG capsule, Take 250 mg by mouth 2 (Two) Times a Day As Needed (constipation)., Disp: 60 each, Rfl: 0  •  losartan-hydrochlorothiazide (HYZAAR) 100-12.5 MG per tablet, Take 1 tablet by mouth Daily., Disp: , Rfl:   •  oxyCODONE-acetaminophen (PERCOCET)  MG per tablet, 1-2 tabs po q4-6 hrs prn pain, Disp: 40 tablet, Rfl: 0  •  tamsulosin (FLOMAX) 0.4 MG capsule 24 hr capsule, Take 1 capsule by mouth Daily., Disp: 90 capsule, Rfl: 5  •  vardenafil (LEVITRA) 20 MG tablet, Take 1 tablet by mouth As Needed for erectile dysfunction., Disp: 20 tablet, Rfl: 1  No current facility-administered medications for this visit.     Past Medical History:   Diagnosis Date   • History of transfusion 1986   • Hypertension    • Nephrolithiasis    • Shoulder pain        Past Surgical History:   Procedure Laterality Date   • BELOW KNEE LEG AMPUTATION Right    • LYMPH NODE BIOPSY     • TOTAL SHOULDER ARTHROPLASTY W/ DISTAL CLAVICLE EXCISION Right 1/22/2019    Procedure: RIGHT  REVERSE TOTAL SHOULDER  ARTHROPLASTY;  Surgeon:  "Donnie Frias MD;  Location: [facility];  Service: Orthopedics       Social History     Socioeconomic History   • Marital status:      Spouse name: Not on file   • Number of children: Not on file   • Years of education: Not on file   • Highest education level: Not on file   Tobacco Use   • Smoking status: Current Some Day Smoker   • Smokeless tobacco: Never Used   • Tobacco comment: occassionally smokes trying to quit as of 4/16/2019   Substance and Sexual Activity   • Alcohol use: No   • Drug use: No   • Sexual activity: Defer       Family History   Problem Relation Age of Onset   • Kidney cancer Father    • No Known Problems Mother        Objective    Temp 97.8 °F (36.6 °C)   Ht 178.5 cm (70.28\")   Wt 113 kg (250 lb)   BMI 35.59 kg/m²     Physical Exam      Hospital Outpatient Visit on 04/18/2019   Component Date Value Ref Range Status   • Creatinine 04/18/2019 1.00  0.60 - 1.30 mg/dL Final    Serial Number: [id number]:  [id number]       Results for orders placed or performed in visit on 04/18/19   POC Urinalysis Dipstick, Multipro   Result Value Ref Range    Color Yellow Yellow, Straw, Dark Yellow, Linda    Clarity, UA Clear Clear    Glucose, UA Negative Negative, 1000 mg/dL (3+) mg/dL    Bilirubin Negative Negative    Ketones, UA Negative Negative    Specific Gravity  1.010 1.005 - 1.030    Blood, UA Trace (A) Negative    pH, Urine 7.0 5.0 - 8.0    Protein, POC Trace (A) Negative mg/dL    Urobilinogen, UA Normal Normal    Nitrite, UA Negative Negative    Leukocytes Negative Negative     Patient's Body mass index is 35.59 kg/m². BMI is above normal parameters. Recommendations include: educational material.  Assessment and Plan    Diagnoses and all orders for this visit:    Hydronephrosis with renal and ureteral calculus obstruction  -     POC Urinalysis Dipstick, Multipro    Renal stone  -     POC Urinalysis Dipstick, Multipro    Ureteral stone  -     POC Urinalysis Dipstick, " Multipro    Hypogonadism in male  -     POC Urinalysis Dipstick, Multipro    Microscopic hematuria  -     POC Urinalysis Dipstick, Multipro    Benign prostatic hyperplasia with lower urinary tract symptoms, symptom details unspecified  -     POC Urinalysis Dipstick, Multipro    Plan--I discussed the findings with the patient again I feel that this distal stone is to probably too big to pass in particular in someone who is never passed a stone before I discussed the operative procedure with him the recommended ureteroscopy laser lithotripsy and double-J stent discussed this with him risk complications a stent irritation etc. I think of answered all his questions.    We will schedule him to have this done next week if he should have any problems prior to then he will call    I also discussed the renal cyst as well as renal stones

## 2019-04-18 NOTE — H&P (VIEW-ONLY)
Subjective    Mr. Rees is 63 y.o. male    CHIEF COMPLAINT: Microscopic hematuria    The patient comes back today for follow-up of microscopic hematuria interestingly enough on his CT scan he has a large stone down the left distal ureter with some mild hydro-he also has bilateral renal calculi he has had also has a large simple cyst all of these are new findings.    He has never had stones before he did indicate now that he thinks about it he did have an episode of pain in the left lower quadrant, left flank a little bit of bowel discomfort and nausea for a few days and the neck pretty much resolved get is not had any previous operations for stones etc.    CT independent reivew    The CT scan of the abdomen/pelvis done with and without contrast is available for me to review.  Treatment recommendations require an independent review.  First I scanned the liver, spleen, and bowel pattern.  The retroperitoneum including the major vessels and lymphatic packages are briefly reviewed.  This film as been reviewed by the radiologist to determine any non urologic abnormalities that are present.  The kidneys are closely inspected for size, symmetry, contour, parenchymal thickness, perinephric reaction, presence of calcifications, and intrarenal dilation of the collecting system.  The ureters are inspected for their course, caliber, and any calcifications.  The bladder is inspected for its thickness, size, and presence of any calcifications.  This scan shows:    The right kidney appears abnormal on this contrasted CT scan.   Normal parenchymal thickness, No hydronephrosis, No hydroureter, No renal masses, Single renal cyst measuring 3cm mm and Single renal stone measuring 8 mm    The left kidney appears abnormal on this contrasted CT scan.   Normal parenchymal thickness, No renal masses, No renal cysts, mild hydronephrosis, mild hydroureter, Mulltiple renal stones measuring 4 and Ureteral stone measuring 1.2mm    The bladder  appears normal on this non-contrasted CT scan.  The bladder appears normal in thickness.  There no masses or stones seen on this exam.      History of Present Illness      The following portions of the patient's history were reviewed and updated as appropriate: allergies, current medications, past family history, past medical history, past social history, past surgical history and problem list.    Review of Systems   Constitutional: Negative.  Negative for chills and fever.   Gastrointestinal: Negative for abdominal distention, abdominal pain, anal bleeding, blood in stool and nausea.   Genitourinary: Positive for difficulty urinating and frequency. Negative for dysuria, flank pain, hematuria and urgency.   Psychiatric/Behavioral: Negative.  Negative for agitation and confusion.         Current Outpatient Medications:   •  aspirin 81 MG EC tablet, Take 81 mg by mouth Daily., Disp: , Rfl:   •  docusate sodium 250 MG capsule, Take 250 mg by mouth 2 (Two) Times a Day As Needed (constipation)., Disp: 60 each, Rfl: 0  •  losartan-hydrochlorothiazide (HYZAAR) 100-12.5 MG per tablet, Take 1 tablet by mouth Daily., Disp: , Rfl:   •  oxyCODONE-acetaminophen (PERCOCET)  MG per tablet, 1-2 tabs po q4-6 hrs prn pain, Disp: 40 tablet, Rfl: 0  •  tamsulosin (FLOMAX) 0.4 MG capsule 24 hr capsule, Take 1 capsule by mouth Daily., Disp: 90 capsule, Rfl: 5  •  vardenafil (LEVITRA) 20 MG tablet, Take 1 tablet by mouth As Needed for erectile dysfunction., Disp: 20 tablet, Rfl: 1  No current facility-administered medications for this visit.     Past Medical History:   Diagnosis Date   • History of transfusion 1986   • Hypertension    • Nephrolithiasis    • Shoulder pain        Past Surgical History:   Procedure Laterality Date   • BELOW KNEE LEG AMPUTATION Right    • LYMPH NODE BIOPSY     • TOTAL SHOULDER ARTHROPLASTY W/ DISTAL CLAVICLE EXCISION Right 1/22/2019    Procedure: RIGHT  REVERSE TOTAL SHOULDER  ARTHROPLASTY;  Surgeon:  "Donnie Frias MD;  Location: [facility];  Service: Orthopedics       Social History     Socioeconomic History   • Marital status:      Spouse name: Not on file   • Number of children: Not on file   • Years of education: Not on file   • Highest education level: Not on file   Tobacco Use   • Smoking status: Current Some Day Smoker   • Smokeless tobacco: Never Used   • Tobacco comment: occassionally smokes trying to quit as of 4/16/2019   Substance and Sexual Activity   • Alcohol use: No   • Drug use: No   • Sexual activity: Defer       Family History   Problem Relation Age of Onset   • Kidney cancer Father    • No Known Problems Mother        Objective    Temp 97.8 °F (36.6 °C)   Ht 178.5 cm (70.28\")   Wt 113 kg (250 lb)   BMI 35.59 kg/m²     Physical Exam      Hospital Outpatient Visit on 04/18/2019   Component Date Value Ref Range Status   • Creatinine 04/18/2019 1.00  0.60 - 1.30 mg/dL Final    Serial Number: [id number]:  [id number]       Results for orders placed or performed in visit on 04/18/19   POC Urinalysis Dipstick, Multipro   Result Value Ref Range    Color Yellow Yellow, Straw, Dark Yellow, Linda    Clarity, UA Clear Clear    Glucose, UA Negative Negative, 1000 mg/dL (3+) mg/dL    Bilirubin Negative Negative    Ketones, UA Negative Negative    Specific Gravity  1.010 1.005 - 1.030    Blood, UA Trace (A) Negative    pH, Urine 7.0 5.0 - 8.0    Protein, POC Trace (A) Negative mg/dL    Urobilinogen, UA Normal Normal    Nitrite, UA Negative Negative    Leukocytes Negative Negative     Patient's Body mass index is 35.59 kg/m². BMI is above normal parameters. Recommendations include: educational material.  Assessment and Plan    Diagnoses and all orders for this visit:    Hydronephrosis with renal and ureteral calculus obstruction  -     POC Urinalysis Dipstick, Multipro    Renal stone  -     POC Urinalysis Dipstick, Multipro    Ureteral stone  -     POC Urinalysis Dipstick, " Multipro    Hypogonadism in male  -     POC Urinalysis Dipstick, Multipro    Microscopic hematuria  -     POC Urinalysis Dipstick, Multipro    Benign prostatic hyperplasia with lower urinary tract symptoms, symptom details unspecified  -     POC Urinalysis Dipstick, Multipro    Plan--I discussed the findings with the patient again I feel that this distal stone is to probably too big to pass in particular in someone who is never passed a stone before I discussed the operative procedure with him the recommended ureteroscopy laser lithotripsy and double-J stent discussed this with him risk complications a stent irritation etc. I think of answered all his questions.    We will schedule him to have this done next week if he should have any problems prior to then he will call    I also discussed the renal cyst as well as renal stones

## 2019-04-22 ENCOUNTER — APPOINTMENT (OUTPATIENT)
Dept: PREADMISSION TESTING | Facility: HOSPITAL | Age: 64
End: 2019-04-22

## 2019-04-22 VITALS
HEIGHT: 70 IN | OXYGEN SATURATION: 96 % | WEIGHT: 250.22 LBS | BODY MASS INDEX: 35.82 KG/M2 | HEART RATE: 87 BPM | SYSTOLIC BLOOD PRESSURE: 119 MMHG | RESPIRATION RATE: 20 BRPM | DIASTOLIC BLOOD PRESSURE: 72 MMHG

## 2019-04-22 DIAGNOSIS — N13.2 HYDRONEPHROSIS WITH RENAL AND URETERAL CALCULUS OBSTRUCTION: ICD-10-CM

## 2019-04-22 LAB
ANION GAP SERPL CALCULATED.3IONS-SCNC: 10 MMOL/L (ref 4–13)
BILIRUB UR QL STRIP: NEGATIVE
BUN BLD-MCNC: 18 MG/DL (ref 5–21)
BUN/CREAT SERPL: 23.4 (ref 7–25)
CALCIUM SPEC-SCNC: 9.1 MG/DL (ref 8.4–10.4)
CHLORIDE SERPL-SCNC: 99 MMOL/L (ref 98–110)
CLARITY UR: CLEAR
CO2 SERPL-SCNC: 30 MMOL/L (ref 24–31)
COLOR UR: YELLOW
CREAT BLD-MCNC: 0.77 MG/DL (ref 0.5–1.4)
DEPRECATED RDW RBC AUTO: 38.6 FL (ref 40–54)
ERYTHROCYTE [DISTWIDTH] IN BLOOD BY AUTOMATED COUNT: 13.9 % (ref 12–15)
GFR SERPL CREATININE-BSD FRML MDRD: 102 ML/MIN/1.73
GLUCOSE BLD-MCNC: 90 MG/DL (ref 70–100)
GLUCOSE UR STRIP-MCNC: NEGATIVE MG/DL
HCT VFR BLD AUTO: 46.9 % (ref 40–52)
HGB BLD-MCNC: 14.9 G/DL (ref 14–18)
HGB UR QL STRIP.AUTO: NEGATIVE
KETONES UR QL STRIP: NEGATIVE
LEUKOCYTE ESTERASE UR QL STRIP.AUTO: ABNORMAL
MCH RBC QN AUTO: 24.8 PG (ref 28–32)
MCHC RBC AUTO-ENTMCNC: 31.8 G/DL (ref 33–36)
MCV RBC AUTO: 78.2 FL (ref 82–95)
NITRITE UR QL STRIP: NEGATIVE
PH UR STRIP.AUTO: 7 [PH] (ref 5–8)
PLATELET # BLD AUTO: 443 10*3/MM3 (ref 130–400)
PMV BLD AUTO: 9.2 FL (ref 6–12)
POTASSIUM BLD-SCNC: 3.7 MMOL/L (ref 3.5–5.3)
PROT UR QL STRIP: NEGATIVE
RBC # BLD AUTO: 6 10*6/MM3 (ref 4.8–5.9)
SODIUM BLD-SCNC: 139 MMOL/L (ref 135–145)
SP GR UR STRIP: 1.01 (ref 1–1.03)
UROBILINOGEN UR QL STRIP: ABNORMAL
WBC NRBC COR # BLD: 8.66 10*3/MM3 (ref 4.8–10.8)

## 2019-04-22 PROCEDURE — 85027 COMPLETE CBC AUTOMATED: CPT | Performed by: UROLOGY

## 2019-04-22 PROCEDURE — 36415 COLL VENOUS BLD VENIPUNCTURE: CPT

## 2019-04-22 PROCEDURE — 80048 BASIC METABOLIC PNL TOTAL CA: CPT | Performed by: UROLOGY

## 2019-04-22 PROCEDURE — 81003 URINALYSIS AUTO W/O SCOPE: CPT | Performed by: UROLOGY

## 2019-04-22 NOTE — DISCHARGE INSTRUCTIONS
DAY OF SURGERY INSTRUCTIONS        YOUR SURGEON: VANGIE ARAGON    PROCEDURE: CYSTOSCOPY LEFT WITH LITHOTRIPSY AND STENT PLACEMENT    DATE OF SURGERY: April 25, 2019    ARRIVAL TIME: AS DIRECTED BY OFFICE    YOU MAY TAKE THE FOLLOWING MEDICATION(S) THE MORNING OF SURGERY WITH A SIP OF WATER: DO NOT TAKE ANY MEDICATIONS THE MORNING OF SURGERY    ALL OTHER HOME MEDICATIONS CHECK WITH YOUR DOCTOR              MANAGING PAIN AFTER SURGERY    We know you are probably wondering what your pain will be like after surgery.  Following surgery it is unrealistic to expect you will not have pain.   Pain is how our bodies let us know that something is wrong or cautions us to be careful.  That said, our goal is to make your pain tolerable.    Methods we may use to treat your pain include (oral or IV medications, PCAs, epidurals, nerve blocks, etc.)   While some procedures require IV pain medications for a short time after surgery, transitioning to pain medications by mouth allows for better management of pain.   Your nurse will encourage you to take oral pain medications whenever possible.  IV medications work almost immediately, but only last a short while.  Taking medications by mouth allows for a more constant level of medication in your blood stream for a longer period of time.      Once your pain is out of control it is harder to get back under control.  It is important you are aware when your next dose of pain medication is due.  If you are admitted, your nurse may write the time of your next dose on the white board in your room to help you remember.      We are interested in your pain and encourage you to inform us about aggravating factors during your visit.   Many times a simple repositioning every few hours can make a big difference.    If your physician says it is okay, do not let your pain prevent you from getting out of bed. Be sure to call your nurse for assistance prior to getting up so you do not fall.      Before  surgery, please decide your tolerable pain goal.  These faces help describe the pain ratings we use on a 0-10 scale.   Be prepared to tell us your goal and whether or not you take pain or anxiety medications at home.            BEFORE YOU COME TO THE HOSPITAL  (Pre-op instructions)  • Do not eat, drink, smoke or chew gum after midnight the night before surgery.  This also includes no mints.  • Morning of surgery take only the medicines you have been instructed with a sip of water unless otherwise instructed  by your physician.  • Do not shave, wear makeup or dark nail polish.  • Remove all jewelry including rings.  • Leave anything you consider valuable at home.  • Leave your suitcase in the car until after your surgery.  • Bring the following with you if applicable:  o Picture ID and insurance, Medicare or Medicaid cards  o Co-pay/deductible required by insurance (cash, check, credit card)  o Copy of advance directive, living will or power-of- documents if not brought to PAT  o CPAP or BIPAP mask and tubing  o Relaxation aids (MP3 player, book, magazine)  • On the day of surgery check in at registration located at the main entrance of the hospital.       Outpatient Surgery Guidelines, Adult  Outpatient procedures are those for which the person having the procedure is allowed to go home the same day as the procedure. Various procedures are done on an outpatient basis. You should follow some general guidelines if you will be having an outpatient procedure.  LET YOUR HEALTH CARE PROVIDER KNOW ABOUT:  · Any allergies you have.  · All medicines you are taking, including vitamins, herbs, eye drops, creams, and over-the-counter medicines.  · Previous problems you or members of your family have had with the use of anesthetics.  · Any blood disorders you have.  · Previous surgeries you have had.  · Medical conditions you have.  RISKS AND COMPLICATIONS  Your health care provider will discuss possible risks and  complications with you before surgery. Common risks and complications include:    · Problems due to the use of anesthetics.  · Blood loss and replacement (does not apply to minor surgical procedures).  · Temporary increase in pain due to surgery.  · Uncorrected pain or problems that the surgery was meant to correct.  · Infection.  · New damage.  BEFORE THE PROCEDURE  · Ask your health care provider about changing or stopping your regular medicines. You may need to stop taking certain medicines in the days or weeks before the procedure.  · Stop smoking at least 2 weeks before surgery. This lowers your risk for complications during and after surgery. Ask your health care provider for help with this if needed.  · Eat your usual meals and a light supper the day before surgery. Continue fluid intake. Do not drink alcohol.  · Do not eat or drink after midnight the night before your surgery.   · Arrange for someone to take you home and to stay with you for 24 hours after the procedure. Medicine given for your procedure may affect your ability to drive or to care for yourself.  · Call your health care provider's office if you develop an illness or problem that may prevent you from safely having your procedure.  AFTER THE PROCEDURE  After surgery, you will be taken to a recovery area, where your progress will be monitored. If there are no complications, you will be allowed to go home when you are awake, stable, and taking fluids well. You may have numbness around the surgical site. Healing will take some time. You will have tenderness at the surgical site and may have some swelling and bruising. You may also have some nausea.  HOME CARE INSTRUCTIONS  · Do not drive for 24 hours, or as directed by your health care provider. Do not drive while taking prescription pain medicines.  · Do not drink alcohol for 24 hours.  · Do not make important decisions or sign legal documents for 24 hours.  · You may resume a normal diet and  activities as directed.  · Do not lift anything heavier than 10 pounds (4.5 kg) or play contact sports until your health care provider says it is okay.  · Change your bandages (dressings) as directed.  · Only take over-the-counter or prescription medicines as directed by your health care provider.  · Follow up with your health care provider as directed.  SEEK MEDICAL CARE IF:  · You have increased bleeding (more than a small spot) from the surgical site.  · You have redness, swelling, or increasing pain in the wound.  · You see pus coming from the wound.  · You have a fever.  · You notice a bad smell coming from the wound or dressing.  · You feel lightheaded or faint.  · You develop a rash.  · You have trouble breathing.  · You develop allergies.  MAKE SURE YOU:  · Understand these instructions.  · Will watch your condition.  · Will get help right away if you are not doing well or get worse.     This information is not intended to replace advice given to you by your health care provider. Make sure you discuss any questions you have with your health care provider.     Document Released: 09/12/2002 Document Revised: 05/03/2016 Document Reviewed: 05/22/2014  [employer] Interactive Patient Education ©2016 [employer]       Fall Prevention in Hospitals, Adult  As a hospital patient, your condition and the treatments you receive can increase your risk for falls. Some additional risk factors for falls in a hospital include:  · Being in an unfamiliar environment.  · Being on bed rest.  · Your surgery.  · Taking certain medicines.  · Your tubing requirements, such as intravenous (IV) therapy or catheters.  It is important that you learn how to decrease fall risks while at the hospital. Below are important tips that can help prevent falls.  SAFETY TIPS FOR PREVENTING FALLS  Talk about your risk of falling.  · Ask your health care provider why you are at risk for falling. Is it your medicine, illness, tubing placement, or  something else?  · Make a plan with your health care provider to keep you safe from falls.  · Ask your health care provider or pharmacist about side effects of your medicines. Some medicines can make you dizzy or affect your coordination.  Ask for help.  · Ask for help before getting out of bed. You may need to press your call button.  · Ask for assistance in getting safely to the toilet.  · Ask for a walker or cane to be put at your bedside. Ask that most of the side rails on your bed be placed up before your health care provider leaves the room.  · Ask family or friends to sit with you.  · Ask for things that are out of your reach, such as your glasses, hearing aids, telephone, bedside table, or call button.  Follow these tips to avoid falling:  · Stay lying or seated, rather than standing, while waiting for help.  · Wear rubber-soled slippers or shoes whenever you walk in the hospital.  · Avoid quick, sudden movements.  ¨ Change positions slowly.  ¨ Sit on the side of your bed before standing.  ¨ Stand up slowly and wait before you start to walk.  · Let your health care provider know if there is a spill on the floor.  · Pay careful attention to the medical equipment, electrical cords, and tubes around you.  · When you need help, use your call button by your bed or in the bathroom. Wait for one of your health care providers to help you.  · If you feel dizzy or unsure of your footing, return to bed and wait for assistance.  · Avoid being distracted by the TV, telephone, or another person in your room.  · Do not lean or support yourself on rolling objects, such as IV poles or bedside tables.     This information is not intended to replace advice given to you by your health care provider. Make sure you discuss any questions you have with your health care provider.     Document Released: 12/15/2001 Document Revised: 01/08/2016 Document Reviewed: 08/25/2013  [employer] Interactive Patient Education ©2016 [employer]       Surgical Site Infections FAQs  What is a Surgical Site Infection (SSI)?  A surgical site infection is an infection that occurs after surgery in the part of the body where the surgery took place. Most patients who have surgery do not develop an infection. However, infections develop in about 1 to 3 out of every 100 patients who have surgery.  Some of the common symptoms of a surgical site infection are:  · Redness and pain around the area where you had surgery  · Drainage of cloudy fluid from your surgical wound  · Fever  Can SSIs be treated?  Yes. Most surgical site infections can be treated with antibiotics. The antibiotic given to you depends on the bacteria (germs) causing the infection. Sometimes patients with SSIs also need another surgery to treat the infection.  What are some of the things that hospitals are doing to prevent SSIs?  To prevent SSIs, doctors, nurses, and other healthcare providers:  · Clean their hands and arms up to their elbows with an antiseptic agent just before the surgery.  · Clean their hands with soap and water or an alcohol-based hand rub before and after caring for each patient.  · May remove some of your hair immediately before your surgery using electric clippers if the hair is in the same area where the procedure will occur. They should not shave you with a razor.  · Wear special hair covers, masks, gowns, and gloves during surgery to keep the surgery area clean.  · Give you antibiotics before your surgery starts. In most cases, you should get antibiotics within 60 minutes before the surgery starts and the antibiotics should be stopped within 24 hours after surgery.  · Clean the skin at the site of your surgery with a special soap that kills germs.  What can I do to help prevent SSIs?  Before your surgery:  · Tell your doctor about other medical problems you may have. Health problems such as allergies, diabetes, and obesity could affect your surgery and your  treatment.  · Quit smoking. Patients who smoke get more infections. Talk to your doctor about how you can quit before your surgery.  · Do not shave near where you will have surgery. Shaving with a razor can irritate your skin and make it easier to develop an infection.  At the time of your surgery:  · Speak up if someone tries to shave you with a razor before surgery. Ask why you need to be shaved and talk with your surgeon if you have any concerns.  · Ask if you will get antibiotics before surgery.  After your surgery:  · Make sure that your healthcare providers clean their hands before examining you, either with soap and water or an alcohol-based hand rub.  · If you do not see your providers clean their hands, please ask them to do so.  · Family and friends who visit you should not touch the surgical wound or dressings.  · Family and friends should clean their hands with soap and water or an alcohol-based hand rub before and after visiting you. If you do not see them clean their hands, ask them to clean their hands.  What do I need to do when I go home from the hospital?  · Before you go home, your doctor or nurse should explain everything you need to know about taking care of your wound. Make sure you understand how to care for your wound before you leave the hospital.  · Always clean your hands before and after caring for your wound.  · Before you go home, make sure you know who to contact if you have questions or problems after you get home.  · If you have any symptoms of an infection, such as redness and pain at the surgery site, drainage, or fever, call your doctor immediately.  If you have additional questions, please ask your doctor or nurse.  Developed and co-sponsored by The Society for Healthcare Epidemiology of [place] (SHEA); Infectious Diseases Society of [place] (IDSA); American [employer] Association; Association for Professionals in Infection Control and Epidemiology (APIC); Centers for Disease  Control and Prevention (CDC); and The Joint Commission.     This information is not intended to replace advice given to you by your health care provider. Make sure you discuss any questions you have with your health care provider.     Document Released: 12/23/2014 Document Revised: 01/08/2016 Document Reviewed: 03/02/2016  [employer] Interactive Patient Education ©2016 [employer]     PATIENT/FAMILY/RESPONSIBLE PARTY VERBALIZES UNDERSTANDING OF ABOVE EDUCATION.  COPY OF PAIN SCALE GIVEN AND REVIEWED WITH VERBALIZED UNDERSTANDING.

## 2019-04-25 ENCOUNTER — APPOINTMENT (OUTPATIENT)
Dept: GENERAL RADIOLOGY | Facility: HOSPITAL | Age: 64
End: 2019-04-25

## 2019-04-25 ENCOUNTER — HOSPITAL ENCOUNTER (OUTPATIENT)
Facility: HOSPITAL | Age: 64
Discharge: HOME | End: 2019-04-25
Attending: UROLOGY | Admitting: UROLOGY

## 2019-04-25 ENCOUNTER — ANESTHESIA (OUTPATIENT)
Dept: PERIOP | Facility: HOSPITAL | Age: 64
End: 2019-04-25

## 2019-04-25 ENCOUNTER — ANESTHESIA EVENT (OUTPATIENT)
Dept: PERIOP | Facility: HOSPITAL | Age: 64
End: 2019-04-25

## 2019-04-25 VITALS
DIASTOLIC BLOOD PRESSURE: 70 MMHG | RESPIRATION RATE: 18 BRPM | TEMPERATURE: 97.1 F | SYSTOLIC BLOOD PRESSURE: 127 MMHG | HEART RATE: 69 BPM | OXYGEN SATURATION: 92 %

## 2019-04-25 DIAGNOSIS — N13.2 URETERAL STONE WITH HYDRONEPHROSIS: Primary | ICD-10-CM

## 2019-04-25 DIAGNOSIS — N13.2 HYDRONEPHROSIS WITH RENAL AND URETERAL CALCULUS OBSTRUCTION: ICD-10-CM

## 2019-04-25 LAB — GLUCOSE BLDC GLUCOMTR-MCNC: 82 MG/DL (ref 70–130)

## 2019-04-25 PROCEDURE — C2617 STENT, NON-COR, TEM W/O DEL: HCPCS | Performed by: UROLOGY

## 2019-04-25 PROCEDURE — 63710000001 ACETAMINOPHEN 500 MG TABLET: Performed by: ANESTHESIOLOGY

## 2019-04-25 PROCEDURE — 25010000002 SUCCINYLCHOLINE PER 20 MG: Performed by: NURSE ANESTHETIST, CERTIFIED REGISTERED

## 2019-04-25 PROCEDURE — C1726 CATH, BAL DIL, NON-VASCULAR: HCPCS | Performed by: UROLOGY

## 2019-04-25 PROCEDURE — 25010000002 MIDAZOLAM PER 1 MG: Performed by: ANESTHESIOLOGY

## 2019-04-25 PROCEDURE — 82360 CALCULUS ASSAY QUANT: CPT | Performed by: UROLOGY

## 2019-04-25 PROCEDURE — C1769 GUIDE WIRE: HCPCS | Performed by: UROLOGY

## 2019-04-25 PROCEDURE — A9270 NON-COVERED ITEM OR SERVICE: HCPCS | Performed by: ANESTHESIOLOGY

## 2019-04-25 PROCEDURE — 25010000002 NEOSTIGMINE PER 0.5 MG: Performed by: NURSE ANESTHETIST, CERTIFIED REGISTERED

## 2019-04-25 PROCEDURE — 25010000002 PROPOFOL 10 MG/ML EMULSION: Performed by: NURSE ANESTHETIST, CERTIFIED REGISTERED

## 2019-04-25 PROCEDURE — 52356 CYSTO/URETERO W/LITHOTRIPSY: CPT | Performed by: UROLOGY

## 2019-04-25 PROCEDURE — 25010000002 ONDANSETRON PER 1 MG: Performed by: NURSE ANESTHETIST, CERTIFIED REGISTERED

## 2019-04-25 PROCEDURE — 25010000002 FENTANYL CITRATE (PF) 100 MCG/2ML SOLUTION: Performed by: NURSE ANESTHETIST, CERTIFIED REGISTERED

## 2019-04-25 PROCEDURE — 82962 GLUCOSE BLOOD TEST: CPT

## 2019-04-25 PROCEDURE — 74018 RADEX ABDOMEN 1 VIEW: CPT

## 2019-04-25 PROCEDURE — 25010000002 IOPAMIDOL 61 % SOLUTION: Performed by: UROLOGY

## 2019-04-25 PROCEDURE — 25010000002 DEXAMETHASONE PER 1 MG: Performed by: NURSE ANESTHETIST, CERTIFIED REGISTERED

## 2019-04-25 PROCEDURE — 88300 SURGICAL PATH GROSS: CPT | Performed by: UROLOGY

## 2019-04-25 DEVICE — URETERAL STENT
Type: IMPLANTABLE DEVICE | Site: URETER | Status: FUNCTIONAL
Brand: PERCUFLEX™ PLUS

## 2019-04-25 RX ORDER — VASOPRESSIN 20 U/ML
INJECTION PARENTERAL AS NEEDED
Status: DISCONTINUED | OUTPATIENT
Start: 2019-04-25 | End: 2019-04-25 | Stop reason: SURG

## 2019-04-25 RX ORDER — IBUPROFEN 600 MG/1
600 TABLET ORAL ONCE AS NEEDED
Status: DISCONTINUED | OUTPATIENT
Start: 2019-04-25 | End: 2019-04-25 | Stop reason: HOSPADM

## 2019-04-25 RX ORDER — LIDOCAINE HYDROCHLORIDE 20 MG/ML
INJECTION, SOLUTION INFILTRATION; PERINEURAL AS NEEDED
Status: DISCONTINUED | OUTPATIENT
Start: 2019-04-25 | End: 2019-04-25 | Stop reason: SURG

## 2019-04-25 RX ORDER — LIDOCAINE HYDROCHLORIDE 40 MG/ML
SOLUTION TOPICAL AS NEEDED
Status: DISCONTINUED | OUTPATIENT
Start: 2019-04-25 | End: 2019-04-25 | Stop reason: SURG

## 2019-04-25 RX ORDER — SODIUM CHLORIDE 0.9 % (FLUSH) 0.9 %
3 SYRINGE (ML) INJECTION AS NEEDED
Status: DISCONTINUED | OUTPATIENT
Start: 2019-04-25 | End: 2019-04-25 | Stop reason: HOSPADM

## 2019-04-25 RX ORDER — MIDAZOLAM HYDROCHLORIDE 1 MG/ML
2 INJECTION INTRAMUSCULAR; INTRAVENOUS
Status: DISCONTINUED | OUTPATIENT
Start: 2019-04-25 | End: 2019-04-25 | Stop reason: HOSPADM

## 2019-04-25 RX ORDER — LABETALOL HYDROCHLORIDE 5 MG/ML
5 INJECTION, SOLUTION INTRAVENOUS
Status: DISCONTINUED | OUTPATIENT
Start: 2019-04-25 | End: 2019-04-25 | Stop reason: HOSPADM

## 2019-04-25 RX ORDER — BUPIVACAINE HCL/0.9 % NACL/PF 0.1 %
2 PLASTIC BAG, INJECTION (ML) EPIDURAL ONCE
Status: COMPLETED | OUTPATIENT
Start: 2019-04-25 | End: 2019-04-25

## 2019-04-25 RX ORDER — METOCLOPRAMIDE HYDROCHLORIDE 5 MG/ML
5 INJECTION INTRAMUSCULAR; INTRAVENOUS
Status: DISCONTINUED | OUTPATIENT
Start: 2019-04-25 | End: 2019-04-25 | Stop reason: HOSPADM

## 2019-04-25 RX ORDER — GLYCOPYRROLATE 0.2 MG/ML
INJECTION INTRAMUSCULAR; INTRAVENOUS AS NEEDED
Status: DISCONTINUED | OUTPATIENT
Start: 2019-04-25 | End: 2019-04-25 | Stop reason: SURG

## 2019-04-25 RX ORDER — DEXAMETHASONE SODIUM PHOSPHATE 4 MG/ML
INJECTION, SOLUTION INTRA-ARTICULAR; INTRALESIONAL; INTRAMUSCULAR; INTRAVENOUS; SOFT TISSUE AS NEEDED
Status: DISCONTINUED | OUTPATIENT
Start: 2019-04-25 | End: 2019-04-25 | Stop reason: SURG

## 2019-04-25 RX ORDER — MAGNESIUM HYDROXIDE 1200 MG/15ML
LIQUID ORAL AS NEEDED
Status: DISCONTINUED | OUTPATIENT
Start: 2019-04-25 | End: 2019-04-25 | Stop reason: HOSPADM

## 2019-04-25 RX ORDER — FENTANYL CITRATE 50 UG/ML
25 INJECTION, SOLUTION INTRAMUSCULAR; INTRAVENOUS AS NEEDED
Status: DISCONTINUED | OUTPATIENT
Start: 2019-04-25 | End: 2019-04-25 | Stop reason: HOSPADM

## 2019-04-25 RX ORDER — ONDANSETRON 2 MG/ML
INJECTION INTRAMUSCULAR; INTRAVENOUS AS NEEDED
Status: DISCONTINUED | OUTPATIENT
Start: 2019-04-25 | End: 2019-04-25 | Stop reason: SURG

## 2019-04-25 RX ORDER — MEPERIDINE HYDROCHLORIDE 25 MG/ML
12.5 INJECTION INTRAMUSCULAR; INTRAVENOUS; SUBCUTANEOUS
Status: DISCONTINUED | OUTPATIENT
Start: 2019-04-25 | End: 2019-04-25 | Stop reason: HOSPADM

## 2019-04-25 RX ORDER — FAMOTIDINE 10 MG/ML
20 INJECTION, SOLUTION INTRAVENOUS
Status: COMPLETED | OUTPATIENT
Start: 2019-04-25 | End: 2019-04-25

## 2019-04-25 RX ORDER — SODIUM CHLORIDE, SODIUM LACTATE, POTASSIUM CHLORIDE, CALCIUM CHLORIDE 600; 310; 30; 20 MG/100ML; MG/100ML; MG/100ML; MG/100ML
1000 INJECTION, SOLUTION INTRAVENOUS CONTINUOUS
Status: DISCONTINUED | OUTPATIENT
Start: 2019-04-25 | End: 2019-04-25 | Stop reason: HOSPADM

## 2019-04-25 RX ORDER — ONDANSETRON 2 MG/ML
4 INJECTION INTRAMUSCULAR; INTRAVENOUS ONCE AS NEEDED
Status: DISCONTINUED | OUTPATIENT
Start: 2019-04-25 | End: 2019-04-25 | Stop reason: HOSPADM

## 2019-04-25 RX ORDER — IPRATROPIUM BROMIDE AND ALBUTEROL SULFATE 2.5; .5 MG/3ML; MG/3ML
3 SOLUTION RESPIRATORY (INHALATION) ONCE AS NEEDED
Status: DISCONTINUED | OUTPATIENT
Start: 2019-04-25 | End: 2019-04-25 | Stop reason: HOSPADM

## 2019-04-25 RX ORDER — PHENYLEPHRINE HCL IN 0.9% NACL 0.8MG/10ML
SYRINGE (ML) INTRAVENOUS AS NEEDED
Status: DISCONTINUED | OUTPATIENT
Start: 2019-04-25 | End: 2019-04-25 | Stop reason: SURG

## 2019-04-25 RX ORDER — SUCCINYLCHOLINE CHLORIDE 20 MG/ML
INJECTION INTRAMUSCULAR; INTRAVENOUS AS NEEDED
Status: DISCONTINUED | OUTPATIENT
Start: 2019-04-25 | End: 2019-04-25 | Stop reason: SURG

## 2019-04-25 RX ORDER — SODIUM CHLORIDE, SODIUM LACTATE, POTASSIUM CHLORIDE, CALCIUM CHLORIDE 600; 310; 30; 20 MG/100ML; MG/100ML; MG/100ML; MG/100ML
100 INJECTION, SOLUTION INTRAVENOUS CONTINUOUS
Status: DISCONTINUED | OUTPATIENT
Start: 2019-04-25 | End: 2019-04-25 | Stop reason: HOSPADM

## 2019-04-25 RX ORDER — SORBITOL 30 G/1000ML
IRRIGANT IRRIGATION AS NEEDED
Status: DISCONTINUED | OUTPATIENT
Start: 2019-04-25 | End: 2019-04-25 | Stop reason: HOSPADM

## 2019-04-25 RX ORDER — ROCURONIUM BROMIDE 10 MG/ML
INJECTION, SOLUTION INTRAVENOUS AS NEEDED
Status: DISCONTINUED | OUTPATIENT
Start: 2019-04-25 | End: 2019-04-25 | Stop reason: SURG

## 2019-04-25 RX ORDER — ACETAMINOPHEN 500 MG
1000 TABLET ORAL ONCE
Status: COMPLETED | OUTPATIENT
Start: 2019-04-25 | End: 2019-04-25

## 2019-04-25 RX ORDER — OXYCODONE AND ACETAMINOPHEN 10; 325 MG/1; MG/1
1 TABLET ORAL ONCE AS NEEDED
Status: DISCONTINUED | OUTPATIENT
Start: 2019-04-25 | End: 2019-04-25 | Stop reason: HOSPADM

## 2019-04-25 RX ORDER — PROPOFOL 10 MG/ML
VIAL (ML) INTRAVENOUS AS NEEDED
Status: DISCONTINUED | OUTPATIENT
Start: 2019-04-25 | End: 2019-04-25 | Stop reason: SURG

## 2019-04-25 RX ORDER — MIDAZOLAM HYDROCHLORIDE 1 MG/ML
1 INJECTION INTRAMUSCULAR; INTRAVENOUS
Status: DISCONTINUED | OUTPATIENT
Start: 2019-04-25 | End: 2019-04-25 | Stop reason: HOSPADM

## 2019-04-25 RX ORDER — NALOXONE HCL 0.4 MG/ML
0.4 VIAL (ML) INJECTION AS NEEDED
Status: DISCONTINUED | OUTPATIENT
Start: 2019-04-25 | End: 2019-04-25 | Stop reason: HOSPADM

## 2019-04-25 RX ORDER — PHENAZOPYRIDINE HYDROCHLORIDE 200 MG/1
200 TABLET, FILM COATED ORAL 3 TIMES DAILY PRN
Qty: 30 TABLET | Refills: 3 | Status: SHIPPED | OUTPATIENT
Start: 2019-04-25 | End: 2019-06-24

## 2019-04-25 RX ORDER — SODIUM CHLORIDE 0.9 % (FLUSH) 0.9 %
1-10 SYRINGE (ML) INJECTION AS NEEDED
Status: DISCONTINUED | OUTPATIENT
Start: 2019-04-25 | End: 2019-04-25 | Stop reason: HOSPADM

## 2019-04-25 RX ORDER — OXYCODONE AND ACETAMINOPHEN 7.5; 325 MG/1; MG/1
2 TABLET ORAL EVERY 4 HOURS PRN
Status: DISCONTINUED | OUTPATIENT
Start: 2019-04-25 | End: 2019-04-25 | Stop reason: HOSPADM

## 2019-04-25 RX ORDER — SODIUM CHLORIDE 0.9 % (FLUSH) 0.9 %
3 SYRINGE (ML) INJECTION EVERY 12 HOURS SCHEDULED
Status: DISCONTINUED | OUTPATIENT
Start: 2019-04-25 | End: 2019-04-25 | Stop reason: HOSPADM

## 2019-04-25 RX ORDER — FENTANYL CITRATE 50 UG/ML
INJECTION, SOLUTION INTRAMUSCULAR; INTRAVENOUS AS NEEDED
Status: DISCONTINUED | OUTPATIENT
Start: 2019-04-25 | End: 2019-04-25 | Stop reason: SURG

## 2019-04-25 RX ADMIN — Medication 2 G: at 10:18

## 2019-04-25 RX ADMIN — ACETAMINOPHEN 1000 MG: 500 TABLET, FILM COATED ORAL at 10:01

## 2019-04-25 RX ADMIN — Medication 160 MCG: at 10:36

## 2019-04-25 RX ADMIN — ROCURONIUM BROMIDE 10 MG: 10 INJECTION INTRAVENOUS at 10:13

## 2019-04-25 RX ADMIN — GLYCOPYRROLATE 0.4 MG: 0.2 INJECTION, SOLUTION INTRAMUSCULAR; INTRAVENOUS at 10:50

## 2019-04-25 RX ADMIN — VASOPRESSIN 1 UNITS: 20 INJECTION INTRAVENOUS at 10:35

## 2019-04-25 RX ADMIN — Medication 160 MCG: at 10:25

## 2019-04-25 RX ADMIN — SODIUM CHLORIDE, POTASSIUM CHLORIDE, SODIUM LACTATE AND CALCIUM CHLORIDE 1000 ML: 600; 310; 30; 20 INJECTION, SOLUTION INTRAVENOUS at 07:00

## 2019-04-25 RX ADMIN — Medication 160 MCG: at 10:21

## 2019-04-25 RX ADMIN — LIDOCAINE HYDROCHLORIDE 100 MG: 20 INJECTION, SOLUTION INFILTRATION; PERINEURAL at 10:13

## 2019-04-25 RX ADMIN — Medication 160 MCG: at 10:30

## 2019-04-25 RX ADMIN — FENTANYL CITRATE 100 MCG: 50 INJECTION, SOLUTION INTRAMUSCULAR; INTRAVENOUS at 10:13

## 2019-04-25 RX ADMIN — SODIUM CHLORIDE, POTASSIUM CHLORIDE, SODIUM LACTATE AND CALCIUM CHLORIDE: 600; 310; 30; 20 INJECTION, SOLUTION INTRAVENOUS at 10:45

## 2019-04-25 RX ADMIN — VASOPRESSIN 1 UNITS: 20 INJECTION INTRAVENOUS at 10:24

## 2019-04-25 RX ADMIN — PROPOFOL 200 MG: 10 INJECTION, EMULSION INTRAVENOUS at 10:13

## 2019-04-25 RX ADMIN — Medication 5 MG: at 10:50

## 2019-04-25 RX ADMIN — SUCCINYLCHOLINE CHLORIDE 160 MG: 20 INJECTION, SOLUTION INTRAMUSCULAR; INTRAVENOUS at 10:14

## 2019-04-25 RX ADMIN — ROCURONIUM BROMIDE 20 MG: 10 INJECTION INTRAVENOUS at 10:17

## 2019-04-25 RX ADMIN — DEXAMETHASONE SODIUM PHOSPHATE 4 MG: 4 INJECTION, SOLUTION INTRAMUSCULAR; INTRAVENOUS at 10:23

## 2019-04-25 RX ADMIN — Medication 160 MCG: at 10:18

## 2019-04-25 RX ADMIN — MIDAZOLAM HYDROCHLORIDE 2 MG: 1 INJECTION, SOLUTION INTRAMUSCULAR; INTRAVENOUS at 10:01

## 2019-04-25 RX ADMIN — VASOPRESSIN 1 UNITS: 20 INJECTION INTRAVENOUS at 10:20

## 2019-04-25 RX ADMIN — FAMOTIDINE 20 MG: 10 INJECTION INTRAVENOUS at 10:01

## 2019-04-25 RX ADMIN — EPHEDRINE SULFATE 20 MG: 50 INJECTION INTRAMUSCULAR; INTRAVENOUS; SUBCUTANEOUS at 10:43

## 2019-04-25 RX ADMIN — EPHEDRINE SULFATE 10 MG: 50 INJECTION INTRAMUSCULAR; INTRAVENOUS; SUBCUTANEOUS at 10:40

## 2019-04-25 RX ADMIN — VASOPRESSIN 1 UNITS: 20 INJECTION INTRAVENOUS at 10:39

## 2019-04-25 RX ADMIN — LIDOCAINE HYDROCHLORIDE 1 EACH: 40 SOLUTION TOPICAL at 10:15

## 2019-04-25 RX ADMIN — ONDANSETRON HYDROCHLORIDE 4 MG: 2 SOLUTION INTRAMUSCULAR; INTRAVENOUS at 10:23

## 2019-04-25 NOTE — ANESTHESIA POSTPROCEDURE EVALUATION
Patient: Roc Rees    Procedure Summary     Date:  04/25/19 Room / Location:   [facility]  /  [facility]    Anesthesia Start:  1010 Anesthesia Stop:  1105    Procedure:  CYSTOSCOPY LEFT URETERAL BALLOON DILATION AND URETEROSCOPY LASER LITHOTRIPSY WITH LEFT STENT INSERTION (Left Ureter) Diagnosis:       Hydronephrosis with renal and ureteral calculus obstruction      (Hydronephrosis with renal and ureteral calculus obstruction [N13.2])    Surgeon:  Tyrel Hilario MD Provider:  Howie Trujillo CRNA    Anesthesia Type:  general ASA Status:  2          Anesthesia Type: general  Last vitals  BP   127/70 (04/25/19 1220)   Temp   97.1 °F (36.2 °C) (04/25/19 1135)   Pulse   69 (04/25/19 1220)   Resp   18 (04/25/19 1220)     SpO2   92 % (04/25/19 1220)     Post Anesthesia Care and Evaluation    Patient location during evaluation: PACU  Patient participation: complete - patient participated  Level of consciousness: awake and alert  Pain management: adequate  Airway patency: patent  Anesthetic complications: No anesthetic complications  PONV Status: none  Cardiovascular status: acceptable and hemodynamically stable  Respiratory status: acceptable  Hydration status: acceptable    Comments: Blood pressure 127/70, pulse 69, temperature 97.1 °F (36.2 °C), temperature source Temporal, resp. rate 18, SpO2 92 %.    Patient discharged from PACU based upon Heath score. Please see RN notes for further details

## 2019-04-25 NOTE — DISCHARGE INSTRUCTIONS

## 2019-04-25 NOTE — ANESTHESIA PROCEDURE NOTES
Airway  Urgency: elective    Airway not difficult    General Information and Staff    Patient location during procedure: OR  CRNA: Howie Trujillo CRNA    Indications and Patient Condition  Indications for airway management: airway protection    Preoxygenated: yes  MILS maintained throughout  Mask difficulty assessment: 2 - vent by mask + OA or adjuvant +/- NMBA    Final Airway Details  Final airway type: endotracheal airway      Successful airway: ETT  Cuffed: yes   Successful intubation technique: direct laryngoscopy  Endotracheal tube insertion site: oral  Blade: Rehman  Blade size: 2  ETT size (mm): 7.0  Cormack-Lehane Classification: grade IIa - partial view of glottis  Placement verified by: chest auscultation and capnometry   Cuff volume (mL): 8  Measured from: lips  ETT to lips (cm): 23  Number of attempts at approach: 1

## 2019-04-25 NOTE — OP NOTE
URETEROSCOPY LASER LITHOTRIPSY WITH STENT INSERTION  Procedure Note    Roc Rees  4/25/2019    Pre-op Diagnosis:   Hydronephrosis with renal and ureteral calculus obstruction [N13.2]    Post-op Diagnosis:     Post-Op Diagnosis Codes:     * Hydronephrosis with renal and ureteral calculus obstruction [N13.2]    Procedure/CPT® Codes:      Procedure(s):  CYSTOSCOPY LEFT URETERAL BALLOON DILATION AND URETEROSCOPY LASER LITHOTRIPSY WITH LEFT STENT INSERTION    Surgeon(s):  Tyrel Hilario MD    Anesthesia: General    Staff:   Circulator: Torie Rachel RN  Scrub Person: Jose Montoya, CST; Sukhdev Crockett    Estimated Blood Loss: 5 mL    Specimens:                      Drains:      Findings: Large stone in the distal left ureter    Complications: None    Indications: The patient presented with microscopic hematuria in the course of the work-up a CT urogram was performed which showed a large nonobstructing stone in the left distal ureter options were discussed the patient regarding management and a left laser lithotripsy was recommended risk complications alternatives were discussed all questions were answered he had no further admit the part of the procedure    Description of Procedure: Patient was brought to the operating suite and under adequate general anesthetic was placed in lithotomy position 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    A 22 [place] Storz scope was advanced to the anterior regions of the normal prostate was minimally obstructing on entering the bladder the orifices normal position with clear urine from the right scant urine from the left bladder was thoroughly inspected with the 30 and 70 degree lenses failed to reveal any tumors or neoplastic processes.    At 038 sensor wire was then advanced along the left ureter and stone up in the renal pelvis under fluoroscopy.    The distal ureter was then dilated with a  6 4 Blue Max gently and slowly over time for his atraumatic dilation as possible.    Following this the rigid ureteroscope was advanced up to the stone which was seen in the distal left ureter a 360 fiber was then placed into the ureter and onto the stone and under direct vision the stone was slowly broken and this pieces that were able to be passed again there was a large stone took some time and actually broke up fairly well with just a very large stone.    Using a nitinol basket several large pieces were removed and sent for pathologic analysis others were dropped in the bladder at the conclusion procedure there were no significant fragments remaining in the distal ureter up above the vessels the wire was in good position.    The ureteroscope was removed over the guidewire 626 double-J was passed in good position cystoscopically and fluoroscopically the strings were left attached.    The patient was returned to recovery in stable condition  Tyrel Hilario MD     Date: 4/25/2019  Time: 10:57 AM

## 2019-04-25 NOTE — ANESTHESIA PREPROCEDURE EVALUATION
Anesthesia Evaluation     Patient summary reviewed   no history of anesthetic complications:  NPO Solid Status: > 8 hours  NPO Liquid Status: > 8 hours           Airway   Mallampati: II  TM distance: >3 FB  Neck ROM: full  No difficulty expected  Dental - normal exam     Pulmonary    (+) a smoker Former Abstained day of surgery,   Cardiovascular   Exercise tolerance: good (4-7 METS)    ECG reviewed    (+) hypertension,       Neuro/Psych  (-) seizures, TIA, CVA  GI/Hepatic/Renal/Endo    (+) obesity,   renal disease (hydronephrosis 2/2 kidney stone) stones,   (-) liver disease, diabetes    Musculoskeletal     Abdominal    Substance History      OB/GYN          Other   (+) arthritis                       Anesthesia Plan    ASA 2     general     intravenous induction   Anesthetic plan, all risks, benefits, and alternatives have been provided, discussed and informed consent has been obtained with: patient.

## 2019-05-02 ENCOUNTER — PROCEDURE VISIT (OUTPATIENT)
Dept: UROLOGY | Facility: CLINIC | Age: 64
End: 2019-05-02

## 2019-05-02 DIAGNOSIS — N13.2 URETERAL STONE WITH HYDRONEPHROSIS: Primary | ICD-10-CM

## 2019-05-02 PROCEDURE — 99211 OFF/OP EST MAY X REQ PHY/QHP: CPT | Performed by: UROLOGY

## 2019-05-10 LAB
LAB AP CASE REPORT: NORMAL
PATH REPORT.FINAL DX SPEC: NORMAL
PATH REPORT.GROSS SPEC: NORMAL

## 2019-06-21 DIAGNOSIS — N13.2 URETERAL STONE WITH HYDRONEPHROSIS: Primary | ICD-10-CM

## 2019-06-21 NOTE — PROGRESS NOTES
Subjective    Mr. Rees is 63 y.o. male    Chief Complaint: Ureteral stone with hydronephrosis    History of Present Illness     Recurrent Nephrolithiasis  Patient is her for recurrent nephrolithiasis.  Location of stone is left distal ureter. Stones were found for workup of hematuria.  Pt is currently Asymptomatic.  Pt. Has had stone disease for severalyear(s). Risk factors for stone disease include none. Previous management of stone disease was Ureteroscopy.  Stone analysis was Calcium Oxalate Monohydrate.  Metabolic workup revealed none.  Current treatment regimen includes none.  Associated symptoms include none.            The following portions of the patient's history were reviewed and updated as appropriate: allergies, current medications, past family history, past medical history, past social history, past surgical history and problem list.    Review of Systems   Constitutional: Negative for chills and fever.   Gastrointestinal: Negative for abdominal pain, anal bleeding and blood in stool.   Genitourinary: Positive for difficulty urinating, frequency and urgency. Negative for decreased urine volume, discharge, dysuria, enuresis, flank pain, genital sores, hematuria, penile pain, penile swelling, scrotal swelling and testicular pain.         Current Outpatient Medications:   •  aspirin 81 MG EC tablet, Take 81 mg by mouth Daily., Disp: , Rfl:   •  docusate sodium 250 MG capsule, Take 250 mg by mouth 2 (Two) Times a Day As Needed (constipation)., Disp: 60 each, Rfl: 0  •  losartan-hydrochlorothiazide (HYZAAR) 100-12.5 MG per tablet, Take 1 tablet by mouth Daily., Disp: , Rfl:   •  tamsulosin (FLOMAX) 0.4 MG capsule 24 hr capsule, Take 1 capsule by mouth Daily. (Patient taking differently: Take 1 capsule by mouth 2 (Two) Times a Day.), Disp: 90 capsule, Rfl: 5  •  vardenafil (LEVITRA) 20 MG tablet, Take 1 tablet by mouth As Needed for erectile dysfunction., Disp: 10 tablet, Rfl: 11  •  Testosterone (AXIRON) 30  "MG/ACT solution, Apply to underarms daily, Disp: 90 mL, Rfl: 5    Past Medical History:   Diagnosis Date   • History of transfusion 1986   • Hypertension    • Nephrolithiasis    • Shoulder pain        Past Surgical History:   Procedure Laterality Date   • BELOW KNEE LEG AMPUTATION Right    • LYMPH NODE BIOPSY     • TOTAL SHOULDER ARTHROPLASTY W/ DISTAL CLAVICLE EXCISION Right 1/22/2019    Procedure: RIGHT  REVERSE TOTAL SHOULDER  ARTHROPLASTY;  Surgeon: Donnie Frias MD;  Location:  [facility] OR;  Service: Orthopedics   • URETEROSCOPY LASER LITHOTRIPSY WITH STENT INSERTION Left 4/25/2019    Procedure: CYSTOSCOPY LEFT URETERAL BALLOON DILATION AND URETEROSCOPY LASER LITHOTRIPSY WITH LEFT STENT INSERTION;  Surgeon: Tyrel Hilario MD;  Location:  [facility] OR;  Service: Urology       Social History     Socioeconomic History   • Marital status:      Spouse name: Not on file   • Number of children: Not on file   • Years of education: Not on file   • Highest education level: Not on file   Tobacco Use   • Smoking status: Current Some Day Smoker   • Smokeless tobacco: Never Used   • Tobacco comment: occassionally smokes trying to quit as of 4/16/2019   Substance and Sexual Activity   • Alcohol use: No   • Drug use: No   • Sexual activity: Defer       Family History   Problem Relation Age of Onset   • Kidney cancer Father    • No Known Problems Mother        Objective    Temp 97.8 °F (36.6 °C)   Ht 180.3 cm (71\")   Wt 113 kg (250 lb)   BMI 34.87 kg/m²     Physical Exam   Constitutional: He is oriented to person, place, and time. He appears well-developed and well-nourished. No distress.   Pulmonary/Chest: Effort normal.   Abdominal: Soft. He exhibits no distension and no mass. There is no tenderness. There is no rebound and no guarding. No hernia.   Genitourinary: Penis normal. Rectal exam shows no mass, no tenderness and anal tone normal. Enlarged: for the age of the patient. Right testis shows no mass, no " swelling and no tenderness. Left testis shows no mass, no swelling and no tenderness. No hypospadias. No discharge found.   Genitourinary Comments:  The urethral meatus normal in position without evidence of stricture. Epididymis without mass or tenderness. Vas Deferens is palpably normal.Anus and perineum without mass or tenderness. The prostate is approximately 30 ml. It is Symmetric, with a Soft consistency. There are no nodules present. . The seminal vesicles are Not palpable due to the size of the prostate.     Neurological: He is alert and oriented to person, place, and time.   Skin: Skin is warm and dry. He is not diaphoretic.   Psychiatric: He has a normal mood and affect.   Vitals reviewed.      Renal ultrasound independent review    The renal ultrasound is available for me to review.  Treatment recommendations require an independent review.  This film has been reviewed by the radiologist to determine any non urologic abnormalities that are presents.  However, I very closely inspected the kidneys for size, symmetry, contour, parenchymal thickness, perinephric reaction, presence of calcifications, and intrarenal dilation of the collecting system.       The right kidney appears non obstructing stone    The left kidney appears renal cyst    The bladder appears normal on thisultrsaound.  The bladder appears normal in thickness.  There no masses or stones seen on this exam.           Results for orders placed or performed in visit on 06/24/19   POC Urinalysis Dipstick, Multipro   Result Value Ref Range    Color Yellow Yellow, Straw, Dark Yellow, Linda    Clarity, UA Clear Clear    Glucose, UA Negative Negative, 1000 mg/dL (3+) mg/dL    Bilirubin Negative Negative    Ketones, UA Negative Negative    Specific Gravity  1.025 1.005 - 1.030    Blood, UA Trace (A) Negative    pH, Urine 5.5 5.0 - 8.0    Protein, POC Negative Negative mg/dL    Urobilinogen, UA Normal Normal    Nitrite, UA Negative Negative    Leukocytes  Negative Negative   Patient's Body mass index is 34.87 kg/m². BMI is above normal parameters. Recommendations include: educational material.    Assessment and Plan    Diagnoses and all orders for this visit:    Nephrolithiasis  -     POC Urinalysis Dipstick, Multipro  -     XR Abdomen KUB; Future    Benign prostatic hyperplasia with lower urinary tract symptoms, symptom details unspecified  -     PSA DIAGNOSTIC; Future    Renal cyst    Erectile dysfunction, unspecified erectile dysfunction type    Hypogonadism, male  -     Testosterone; Future  -     CBC (No Diff); Future  -     Testosterone (AXIRON) 30 MG/ACT solution; Apply to underarms daily    Hypogonadism in male  -     vardenafil (LEVITRA) 20 MG tablet; Take 1 tablet by mouth As Needed for erectile dysfunction.        AUA symptom score is 8/35.  He does have frequency as well as incomplete emptying intermittency urgency straining and nocturia x1.    Former patient of Dr. Hilario's with BPH, low testosterone, nephrolithiasis, and erectile dysfunction.  Continue Flomax 0.8 as well as his Levitra.  I will continue his Axiron.  Regarding his stone disease I discussed ESWL versus observation he would like to continue to observe.  He will follow-up in 6 months with repeat labs

## 2019-06-24 ENCOUNTER — HOSPITAL ENCOUNTER (OUTPATIENT)
Dept: ULTRASOUND IMAGING | Facility: HOSPITAL | Age: 64
Discharge: HOME | End: 2019-06-24
Admitting: UROLOGY

## 2019-06-24 ENCOUNTER — OFFICE VISIT (OUTPATIENT)
Dept: UROLOGY | Facility: CLINIC | Age: 64
End: 2019-06-24

## 2019-06-24 VITALS — WEIGHT: 250 LBS | TEMPERATURE: 97.8 F | HEIGHT: 71 IN | BODY MASS INDEX: 35 KG/M2

## 2019-06-24 DIAGNOSIS — E29.1 HYPOGONADISM, MALE: ICD-10-CM

## 2019-06-24 DIAGNOSIS — E29.1 HYPOGONADISM IN MALE: ICD-10-CM

## 2019-06-24 DIAGNOSIS — N52.9 ERECTILE DYSFUNCTION, UNSPECIFIED ERECTILE DYSFUNCTION TYPE: ICD-10-CM

## 2019-06-24 DIAGNOSIS — N40.1 BENIGN PROSTATIC HYPERPLASIA WITH LOWER URINARY TRACT SYMPTOMS, SYMPTOM DETAILS UNSPECIFIED: ICD-10-CM

## 2019-06-24 DIAGNOSIS — N20.0 NEPHROLITHIASIS: Primary | ICD-10-CM

## 2019-06-24 DIAGNOSIS — N13.2 URETERAL STONE WITH HYDRONEPHROSIS: ICD-10-CM

## 2019-06-24 DIAGNOSIS — N28.1 RENAL CYST: ICD-10-CM

## 2019-06-24 LAB
BILIRUB BLD-MCNC: NEGATIVE MG/DL
CLARITY, POC: CLEAR
COLOR UR: YELLOW
GLUCOSE UR STRIP-MCNC: NEGATIVE MG/DL
KETONES UR QL: NEGATIVE
LEUKOCYTE EST, POC: NEGATIVE
NITRITE UR-MCNC: NEGATIVE MG/ML
PH UR: 5.5 [PH] (ref 5–8)
PROT UR STRIP-MCNC: NEGATIVE MG/DL
RBC # UR STRIP: ABNORMAL /UL
SP GR UR: 1.02 (ref 1–1.03)
UROBILINOGEN UR QL: NORMAL

## 2019-06-24 PROCEDURE — 81001 URINALYSIS AUTO W/SCOPE: CPT | Performed by: UROLOGY

## 2019-06-24 PROCEDURE — 76775 US EXAM ABDO BACK WALL LIM: CPT

## 2019-06-24 PROCEDURE — 99214 OFFICE O/P EST MOD 30 MIN: CPT | Performed by: UROLOGY

## 2019-06-24 RX ORDER — VARDENAFIL HYDROCHLORIDE 20 MG/1
20 TABLET ORAL AS NEEDED
Qty: 10 TABLET | Refills: 11 | Status: SHIPPED | OUTPATIENT
Start: 2019-06-24 | End: 2019-12-27 | Stop reason: SDUPTHER

## 2019-06-24 RX ORDER — TESTOSTERONE 30 MG/1.5ML
SOLUTION TOPICAL
Qty: 90 ML | Refills: 5 | Status: SHIPPED | OUTPATIENT
Start: 2019-06-24 | End: 2019-12-27 | Stop reason: SDUPTHER

## 2019-06-24 NOTE — PATIENT INSTRUCTIONS
Smoking Tobacco Information, Adult  Smoking tobacco will very likely harm your health. Tobacco contains a poisonous (toxic), colorless chemical called nicotine. Nicotine affects the brain and makes tobacco addictive. This change in your brain can make it hard to stop smoking. Tobacco also has other toxic chemicals that can hurt your body and raise your risk of many cancers.  How can smoking tobacco affect me?  Smoking tobacco can increase your chances of having serious health conditions, such as:  · Cancer. Smoking is most commonly associated with lung cancer, but can lead to cancer in other parts of the body.  · Chronic obstructive pulmonary disease (COPD). This is a long-term lung condition that makes it hard to breathe. It also gets worse over time.  · High blood pressure (hypertension), heart disease, stroke, or heart attack.  · Lung infections, such as pneumonia.  · Cataracts. This is when the lenses in the eyes become clouded.  · Digestive problems. This may include peptic ulcers, heartburn, and gastroesophageal reflux disease (GERD).  · Oral health problems, such as gum disease and tooth loss.  · Loss of taste and smell.    Smoking can affect your appearance by causing:  · Wrinkles.  · Yellow or stained teeth, fingers, and fingernails.    Smoking tobacco can also affect your social life.  · Many workplaces, restaurants, hotels, and public places are tobacco-free. This means that you may experience challenges in finding places to smoke when away from home.  · The cost of a smoking habit can be expensive. Expenses for someone who smokes come in two ways:  ? You spend money on a regular basis to buy tobacco.  ? Your health care costs in the long-term are higher if you smoke.  · Tobacco smoke can also affect the health of those around you. Children of smokers have greater chances of:  ? Sudden infant death syndrome (SIDS).  ? Ear infections.  ? Lung infections.    What lifestyle changes can be made?  · Do not  start smoking. Quit if you already do.  · To quit smoking:  ? Make a plan to quit smoking and commit yourself to it. Look for programs to help you and ask your health care provider for recommendations and ideas.  ? Talk with your health care provider about using nicotine replacement medicines to help you quit. Medicine replacement medicines include gum, lozenges, patches, sprays, or pills.  ? Do not replace cigarette smoking with electronic cigarettes, which are commonly called e-cigarettes. The safety of e-cigarettes is not known, and some may contain harmful chemicals.  ? Avoid places, people, or situations that tempt you to smoke.  ? If you try to quit but return to smoking, don't give up hope. It is very common for people to try a number of times before they fully succeed. When you feel ready again, give it another try.  · Quitting smoking might affect the way you eat as well as your weight. Be prepared to monitor your eating habits. Get support in planning and following a healthy diet.  · Ask your health care provider about having regular tests (screenings) to check for cancer. This may include blood tests, imaging tests, and other tests.  · Exercise regularly. Consider taking walks, joining a gym, or doing yoga or exercise classes.  · Develop skills to manage your stress. These skills include meditation.  What are the benefits of quitting smoking?  By quitting smoking, you may:  · Lower your risk of getting cancer and other diseases caused by smoking.  · Live longer.  · Breathe better.  · Lower your blood pressure and heart rate.  · Stop your addiction to tobacco.  · Stop creating secondhand smoke that hurts other people.  · Improve your sense of taste and smell.  · Look better over time, due to having fewer wrinkles and less staining.    What can happen if changes are not made?  If you do not stop smoking, you may:  · Get cancer and other diseases.  · Develop COPD or other long-term (chronic) lung  conditions.  · Develop serious problems with your heart and blood vessels (cardiovascular system).  · Need more tests to screen for problems caused by smoking.  · Have higher, long-term healthcare costs from medicines or treatments related to smoking.  · Continue to have worsening changes in your lungs, mouth, and nose.    Where to find support  To get support to quit smoking, consider:  · Asking your health care provider for more information and resources.  · Taking classes to learn more about quitting smoking.  · Looking for local organizations that offer resources about quitting smoking.  · Joining a support group for people who want to quit smoking in your local community.    Where to find more information  You may find more information about quitting smoking from:  · HelpGuide.org: www.helpguide.org/articles/addictions/how-to-quit-smoking.htm  · Smokefree.gov: smokefree.gov  · American Lung Association: www.lung.org    Contact a health care provider if:  · You have problems breathing.  · Your lips, nose, or fingers turn blue.  · You have chest pain.  · You are coughing up blood.  · You feel faint or you pass out.  · You have other noticeable changes that cause you to worry.  Summary  · Smoking tobacco can negatively affect your health, the health of those around you, your finances, and your social life.  · Do not start smoking. Quit if you already do. If you need help quitting, ask your health care provider.  · Think about joining a support group for people who want to quit smoking in your local community. There are many effective programs that will help you to quit this behavior.  This information is not intended to replace advice given to you by your health care provider. Make sure you discuss any questions you have with your health care provider.  Document Released: 01/02/2018 Document Revised: 01/02/2018 Document Reviewed: 01/02/2018  Elsevier Interactive Patient Education © 2019 [employer]  BMI for  Adults  Body mass index (BMI) is a number that is calculated from a person's weight and height. In most adults, the number is used to find how much of an adult's weight is made up of fat. BMI is not as accurate as a direct measure of body fat.  How is BMI calculated?  BMI is calculated by dividing weight in kilograms by height in meters squared. It can also be calculated by dividing weight in pounds by height in inches squared, then multiplying the resulting number by 703. Charts are available to help you find your BMI quickly and easily without doing this calculation.  How is BMI interpreted?  Health care professionals use BMI charts to identify whether an adult is underweight, at a normal weight, or overweight based on the following guidelines:  · Underweight: BMI less than 18.5.  · Normal weight: BMI between 18.5 and 24.9.  · Overweight: BMI between 25 and 29.9.  · Obese: BMI of 30 and above.    BMI is usually interpreted the same for males and females.  Weight includes both fat and muscle, so someone with a muscular build, such as an athlete, may have a BMI that is higher than 24.9. In cases like these, BMI may not accurately depict body fat. To determine if excess body fat is the cause of a BMI of 25 or higher, further assessments may need to be done by a health care provider.  Why is BMI a useful tool?  BMI is used to identify a possible weight problem that may be related to a medical problem or may increase the risk for medical problems. BMI can also be used to promote changes to reach a healthy weight.  This information is not intended to replace advice given to you by your health care provider. Make sure you discuss any questions you have with your health care provider.  Document Released: 08/29/2005 Document Revised: 04/27/2017 Document Reviewed: 05/15/2015  "[employer] Interactive Patient Education © 2018 "[employer] Inc.

## 2019-06-27 ENCOUNTER — ANESTHESIA EVENT (OUTPATIENT)
Dept: OPERATING ROOM | Age: 64
End: 2019-06-27

## 2019-06-28 ENCOUNTER — HOSPITAL ENCOUNTER (OUTPATIENT)
Age: 64
Setting detail: SPECIMEN
Discharge: HOME | End: 2019-06-28
Payer: COMMERCIAL

## 2019-06-28 ENCOUNTER — APPOINTMENT (OUTPATIENT)
Dept: OPERATING ROOM | Age: 64
End: 2019-06-28

## 2019-06-28 ENCOUNTER — ANESTHESIA (OUTPATIENT)
Dept: OPERATING ROOM | Age: 64
End: 2019-06-28

## 2019-06-28 ENCOUNTER — HOSPITAL ENCOUNTER (OUTPATIENT)
Age: 64
Setting detail: OUTPATIENT SURGERY
Discharge: HOME | End: 2019-06-28
Attending: INTERNAL MEDICINE | Admitting: INTERNAL MEDICINE
Payer: COMMERCIAL

## 2019-06-28 VITALS
HEIGHT: 70 IN | BODY MASS INDEX: 35.79 KG/M2 | WEIGHT: 250 LBS | RESPIRATION RATE: 18 BRPM | HEART RATE: 75 BPM | SYSTOLIC BLOOD PRESSURE: 80 MMHG | DIASTOLIC BLOOD PRESSURE: 49 MMHG | OXYGEN SATURATION: 94 %

## 2019-06-28 VITALS — DIASTOLIC BLOOD PRESSURE: 54 MMHG | OXYGEN SATURATION: 94 % | SYSTOLIC BLOOD PRESSURE: 81 MMHG

## 2019-06-28 PROCEDURE — 88305 TISSUE EXAM BY PATHOLOGIST: CPT

## 2019-06-28 PROCEDURE — G8918 PT W/O PREOP ORDER IV AB PRO: HCPCS | Performed by: NURSE PRACTITIONER

## 2019-06-28 PROCEDURE — 45385 COLONOSCOPY W/LESION REMOVAL: CPT

## 2019-06-28 PROCEDURE — 45385 COLONOSCOPY W/LESION REMOVAL: CPT | Performed by: INTERNAL MEDICINE

## 2019-06-28 PROCEDURE — G8907 PT DOC NO EVENTS ON DISCHARG: HCPCS | Performed by: NURSE PRACTITIONER

## 2019-06-28 RX ORDER — LIDOCAINE HYDROCHLORIDE 10 MG/ML
INJECTION, SOLUTION INFILTRATION; PERINEURAL PRN
Status: DISCONTINUED | OUTPATIENT
Start: 2019-06-28 | End: 2019-06-28 | Stop reason: SDUPTHER

## 2019-06-28 RX ORDER — SODIUM CHLORIDE 9 MG/ML
INJECTION, SOLUTION INTRAVENOUS CONTINUOUS PRN
Status: DISCONTINUED | OUTPATIENT
Start: 2019-06-28 | End: 2019-06-28 | Stop reason: SDUPTHER

## 2019-06-28 RX ORDER — TAMSULOSIN HYDROCHLORIDE 0.4 MG/1
0.4 CAPSULE ORAL DAILY
COMMUNITY
End: 2021-12-02 | Stop reason: ALTCHOICE

## 2019-06-28 RX ORDER — FENTANYL CITRATE 50 UG/ML
INJECTION, SOLUTION INTRAMUSCULAR; INTRAVENOUS PRN
Status: DISCONTINUED | OUTPATIENT
Start: 2019-06-28 | End: 2019-06-28 | Stop reason: SDUPTHER

## 2019-06-28 RX ORDER — SODIUM CHLORIDE 9 MG/ML
INJECTION, SOLUTION INTRAVENOUS CONTINUOUS
Status: DISCONTINUED | OUTPATIENT
Start: 2019-06-28 | End: 2019-06-28 | Stop reason: HOSPADM

## 2019-06-28 RX ORDER — PROPOFOL 10 MG/ML
INJECTION, EMULSION INTRAVENOUS PRN
Status: DISCONTINUED | OUTPATIENT
Start: 2019-06-28 | End: 2019-06-28 | Stop reason: SDUPTHER

## 2019-06-28 RX ADMIN — PROPOFOL 150 MG: 10 INJECTION, EMULSION INTRAVENOUS at 12:06

## 2019-06-28 RX ADMIN — LIDOCAINE HYDROCHLORIDE 40 MG: 10 INJECTION, SOLUTION INFILTRATION; PERINEURAL at 12:06

## 2019-06-28 RX ADMIN — SODIUM CHLORIDE: 9 INJECTION, SOLUTION INTRAVENOUS at 12:01

## 2019-06-28 RX ADMIN — SODIUM CHLORIDE: 9 INJECTION, SOLUTION INTRAVENOUS at 10:54

## 2019-06-28 RX ADMIN — FENTANYL CITRATE 50 MCG: 50 INJECTION, SOLUTION INTRAMUSCULAR; INTRAVENOUS at 12:05

## 2019-06-28 SDOH — HEALTH STABILITY: MENTAL HEALTH: HOW OFTEN DO YOU HAVE A DRINK CONTAINING ALCOHOL?: NEVER

## 2019-06-28 NOTE — ANESTHESIA PRE PROCEDURE
Department of Anesthesiology  Preprocedure Note       Name:  Lenoria Cowden   Age:  59 y.o.  :  1955                                          MRN:  [id number]         Date:  2019      Surgeon: Sj Damico):  Brie Wellington MD    Procedure: COLONOSCOPY (N/A )    Medications prior to admission:   Prior to Admission medications    Not on File       Current medications:    No current facility-administered medications for this encounter. Allergies: Allergies not on file    Problem List:  There is no problem list on file for this patient. Past Medical History:  No past medical history on file. Past Surgical History:  No past surgical history on file. Social History:    Social History     Tobacco Use    Smoking status: Not on file   Substance Use Topics    Alcohol use: Not on file                                Counseling given: Not Answered      Vital Signs (Current): There were no vitals filed for this visit. BP Readings from Last 3 Encounters:   No data found for BP       NPO Status:                                                                                 BMI:   Wt Readings from Last 3 Encounters:   No data found for Wt     There is no height or weight on file to calculate BMI.    CBC: No results found for: WBC, RBC, HGB, HCT, MCV, RDW, PLT    CMP: No results found for: NA, K, CL, CO2, BUN, CREATININE, GFRAA, AGRATIO, LABGLOM, GLUCOSE, PROT, CALCIUM, BILITOT, ALKPHOS, AST, ALT    POC Tests: No results for input(s): POCGLU, POCNA, POCK, POCCL, POCBUN, POCHEMO, POCHCT in the last 72 hours.     Coags: No results found for: PROTIME, INR, APTT    HCG (If Applicable): No results found for: PREGTESTUR, PREGSERUM, HCG, HCGQUANT     ABGs: No results found for: PHART, PO2ART, TQB7MXO, PJM7PEQ, BEART, W2SQDAYA     Type & Screen (If Applicable):  No results found for: LABABO, [address]    Anesthesia Evaluation  Patient summary reviewed and Nursing notes reviewed no history of anesthetic complications:   Airway: Mallampati: II  TM distance: >3 FB   Neck ROM: full  Mouth opening: < 3 FB Dental: normal exam         Pulmonary:Negative Pulmonary ROS and normal exam                               Cardiovascular:    (+) hypertension:,          Beta Blocker:  Not on Beta Blocker         Neuro/Psych:   Negative Neuro/Psych ROS              GI/Hepatic/Renal:   (+) bowel prep,          ROS comment: BMI 38. Endo/Other:                      ROS comment: AKA Abdominal:           Vascular: negative vascular ROS. Anesthesia Plan      general and TIVA     ASA 3       Induction: intravenous. Anesthetic plan and risks discussed with patient.                       Debbie Brown, APRN - CRNA   6/28/2019

## 2019-06-28 NOTE — H&P
Never Used   Substance Use Topics    Alcohol use: Never     Frequency: Never    Drug use: Not on file       Vital Signs:   Vitals:    06/28/19 1050   BP: 108/67   Pulse: 83   Resp: 20   SpO2: 94%        Physical Exam:  Cardiac:  [x]WNL  []Comments:  Pulmonary:  [x]WNL   []Comments:  Neuro/Mental Status:  [x]WNL  []Comments:  Abdominal:  [x]WNL    []Comments:  Other:   []WNL  []Comments:    Informed Consent:  The risks and benefits of the procedure have been discussed with either the patient or if they cannot consent, their representative. Assessment:  Patient examined and appropriate for planned sedation and procedure. Plan:  Proceed with planned sedation and procedure as above. Mounika Moffett am scribing for and in the presence of Dr. Luz Howard MD.  Electronically signed by Ellie Landis RN on 6/28/2019 at 10:51 AM    I personally performed the services described in this documentation as scribed by Elizabeth Mae, and it appears accurate and complete.      Luz Howard MD  6/28/2019

## 2019-06-28 NOTE — ANESTHESIA POSTPROCEDURE EVALUATION
Department of Anesthesiology  Postprocedure Note    Patient: Kiran Mcgarry  MRN: [id number]  YOB: 1955  Date of evaluation: 6/28/2019  Time:  12:26 PM     Procedure Summary     Date:  06/28/19 Room / Location:  [facility] ENDO 01 / [facility] OR    Anesthesia Start:  0296 Anesthesia Stop:      Procedure:  COLONOSCOPY with polypectomy (N/A Abdomen) Diagnosis:  (SCREEN)    Surgeon:  Alexis Traore MD Responsible Provider:  CHANEL Winn CRNA    Anesthesia Type:  general, TIVA ASA Status:  3          Anesthesia Type: general, TIVA    Melonie Phase I:      Melonie Phase II:      Last vitals: Reviewed and per EMR flowsheets.        Anesthesia Post Evaluation    Patient location during evaluation: bedside  Patient participation: complete - patient participated  Level of consciousness: sleepy but conscious  Pain score: 0  Airway patency: patent  Nausea & Vomiting: no nausea and no vomiting  Complications: no  Cardiovascular status: hemodynamically stable and blood pressure returned to baseline  Respiratory status: acceptable and nasal cannula  Hydration status: stable

## 2019-07-10 NOTE — H&P
Patient Name: Evan Gonzalez  : 1955  MRN: [id number]  DATE of procedure: 2019    Allergies: No Known Allergies     ENDOSCOPY  History and Physical    Procedure:    [] Diagnostic Colonoscopy       [x] Screening Colonoscopy  [] EGD      [] ERCP      [] EUS       [] Other    [x] Previous office notes/History and Physical reviewed from the patients chart. Please see EMR for further details of HPI. I have examined the patient's status immediately prior to the procedure and:      Indications/HPI:    []Abdominal Pain   []Cancer- GI/Lung     []Fhx of colon CA/polyps  []History of Polyps  []Barretts            []Melena  []Abnormal Imaging              []Dysphagia              []Persistent Pneumonia   []Anemia                            []Food Impaction        []History of Polyps  [] GI Bleed             []Pulmonary nodule/Mass   []Change in bowel habits []Heartburn/Reflux  []Rectal Bleed (BRBPR)  []Chest Pain - Non Cardiac []Heme (+) Stool []Ulcers  []Constipation  []Hemoptysis  []Varices  []Diarrhea  []Hypoxemia    []Nausea/Vomiting   [x]Screening   []Crohns/Colitis  []Other:     Anesthesia:   [x] MAC [] Moderate Sedation   [] General   [] None     ROS: 12 pt Review of Symptoms was negative unless mentioned above    Medications:   Prior to Admission medications    Medication Sig Start Date End Date Taking?  Authorizing Provider   tamsulosin (FLOMAX) 0.4 MG capsule Take 0.4 mg by mouth daily   Yes Historical Provider, MD   LOSARTAN POTASSIUM-HCTZ PO Take by mouth daily   Yes Historical Provider, MD       Past Medical History:  Past Medical History:   Diagnosis Date    History of blood transfusion     Hypertension        Past Surgical History:  Past Surgical History:   Procedure Laterality Date    COLONOSCOPY      COLONOSCOPY N/A 2019    Dr Brooke Calvert-Diverticular disease-Tubular AP (-) dysplasia x 2    JOINT REPLACEMENT Right     shoulder    LEG AMPUTATION BELOW KNEE Right     SKIN GRAFT Right     leg Social History:  Social History     Tobacco Use    Smoking status: Former Smoker    Smokeless tobacco: Never Used   Substance Use Topics    Alcohol use: Never     Frequency: Never    Drug use: Not on file       Vital Signs:   Vitals:    06/28/19 1248   BP: (!) 80/49   Pulse: 75   Resp: 18   SpO2: 94%        Physical Exam:  Cardiac:  [x]WNL  []Comments:  Pulmonary:  [x]WNL   []Comments:  Neuro/Mental Status:  [x]WNL  []Comments:  Abdominal:  [x]WNL    []Comments:  Other:   []WNL  []Comments:    Informed Consent:  The risks and benefits of the procedure have been discussed with either the patient or if they cannot consent, their representative. Assessment:  Patient examined and appropriate for planned sedation and procedure. Plan:  Proceed with planned sedation and procedure as above. Griselda Decker am scribing for and in the presence of Dr. Nunu Bruno MD.  Electronically signed by Shawn Avery MD on 7/10/2019 at 3:38 PM    I personally performed the services described in this documentation as scribed by Chasity Prater, and it appears accurate and complete.      Nunu Bruno MD    *late noted added due to EMR \"locking out\" original H&P completed on day of procedure that could not be signed appropriately

## 2019-09-09 ENCOUNTER — TRANSCRIBE ORDERS (OUTPATIENT)
Dept: GENERAL RADIOLOGY | Facility: HOSPITAL | Age: 64
End: 2019-09-09

## 2019-09-09 ENCOUNTER — HOSPITAL ENCOUNTER (OUTPATIENT)
Dept: GENERAL RADIOLOGY | Facility: HOSPITAL | Age: 64
Discharge: HOME | End: 2019-09-09
Admitting: NURSE PRACTITIONER

## 2019-09-09 DIAGNOSIS — R06.02 SHORTNESS OF BREATH: ICD-10-CM

## 2019-09-09 DIAGNOSIS — R06.02 SHORTNESS OF BREATH: Primary | ICD-10-CM

## 2019-09-09 PROCEDURE — 71046 X-RAY EXAM CHEST 2 VIEWS: CPT

## 2019-09-10 ENCOUNTER — TRANSCRIBE ORDERS (OUTPATIENT)
Dept: ADMINISTRATIVE | Facility: HOSPITAL | Age: 64
End: 2019-09-10

## 2019-09-10 DIAGNOSIS — Z72.0 TOBACCO ABUSE: ICD-10-CM

## 2019-09-10 DIAGNOSIS — R06.02 SHORTNESS OF BREATH: Primary | ICD-10-CM

## 2019-09-11 ENCOUNTER — HOSPITAL ENCOUNTER (OUTPATIENT)
Dept: PULMONOLOGY | Facility: HOSPITAL | Age: 64
Discharge: HOME | End: 2019-09-11
Admitting: NURSE PRACTITIONER

## 2019-09-11 DIAGNOSIS — Z72.0 TOBACCO ABUSE: ICD-10-CM

## 2019-09-11 DIAGNOSIS — R06.02 SHORTNESS OF BREATH: ICD-10-CM

## 2019-09-11 PROCEDURE — 94010 BREATHING CAPACITY TEST: CPT

## 2019-09-11 PROCEDURE — 94010 BREATHING CAPACITY TEST: CPT | Performed by: INTERNAL MEDICINE

## 2019-09-11 PROCEDURE — 94729 DIFFUSING CAPACITY: CPT | Performed by: INTERNAL MEDICINE

## 2019-09-11 PROCEDURE — 94727 GAS DIL/WSHOT DETER LNG VOL: CPT

## 2019-09-11 PROCEDURE — 94729 DIFFUSING CAPACITY: CPT

## 2019-09-11 PROCEDURE — 94727 GAS DIL/WSHOT DETER LNG VOL: CPT | Performed by: INTERNAL MEDICINE

## 2019-12-19 LAB
ERYTHROCYTE [DISTWIDTH] IN BLOOD BY AUTOMATED COUNT: 18.4 % (ref 12.3–15.4)
HCT VFR BLD AUTO: 51.8 % (ref 37.5–51)
HGB BLD-MCNC: 16.6 G/DL (ref 13–17.7)
MCH RBC QN AUTO: 24.6 PG (ref 26.6–33)
MCHC RBC AUTO-ENTMCNC: 32 G/DL (ref 31.5–35.7)
MCV RBC AUTO: 76.6 FL (ref 79–97)
PLATELET # BLD AUTO: 298 10*3/MM3 (ref 140–450)
RBC # BLD AUTO: 6.76 10*6/MM3 (ref 4.14–5.8)
TESTOST SERPL-MCNC: 559 NG/DL (ref 264–916)
WBC # BLD AUTO: 5.35 10*3/MM3 (ref 3.4–10.8)

## 2019-12-21 ENCOUNTER — RESULTS ENCOUNTER (OUTPATIENT)
Dept: UROLOGY | Facility: CLINIC | Age: 64
End: 2019-12-21

## 2019-12-21 DIAGNOSIS — E29.1 HYPOGONADISM, MALE: ICD-10-CM

## 2019-12-22 ENCOUNTER — RESULTS ENCOUNTER (OUTPATIENT)
Dept: UROLOGY | Facility: CLINIC | Age: 64
End: 2019-12-22

## 2019-12-22 DIAGNOSIS — N40.1 BENIGN PROSTATIC HYPERPLASIA WITH LOWER URINARY TRACT SYMPTOMS, SYMPTOM DETAILS UNSPECIFIED: ICD-10-CM

## 2019-12-26 NOTE — PROGRESS NOTES
Subjective    Mr. Rees is 64 y.o. male    Chief Complaint: Ureteral stone with hydronephrosis    History of Present Illness     Recurrent Nephrolithiasis  Patient is her for recurrent nephrolithiasis.  Location of stone is left distal ureter. Stones were found for workup of hematuria.  Pt is currently Asymptomatic.  Pt. Has had stone disease for severalyear(s). Risk factors for stone disease include none. Previous management of stone disease was Ureteroscopy.  Stone analysis was Calcium Oxalate Monohydrate.  Metabolic workup revealed none.  Current treatment regimen includes none.  Associated symptoms include none.      The following portions of the patient's history were reviewed and updated as appropriate: allergies, current medications, past family history, past medical history, past social history, past surgical history and problem list.    Review of Systems   Constitutional: Negative for chills and fever.   Gastrointestinal: Negative for abdominal pain, anal bleeding and blood in stool.   Genitourinary: Negative for decreased urine volume, difficulty urinating, discharge, dysuria, enuresis, flank pain, frequency, genital sores, hematuria, penile pain, penile swelling, scrotal swelling, testicular pain and urgency.         Current Outpatient Medications:   •  aspirin 81 MG EC tablet, Take 81 mg by mouth Daily., Disp: , Rfl:   •  docusate sodium 250 MG capsule, Take 250 mg by mouth 2 (Two) Times a Day As Needed (constipation)., Disp: 60 each, Rfl: 0  •  losartan-hydrochlorothiazide (HYZAAR) 100-12.5 MG per tablet, Take 1 tablet by mouth Daily., Disp: , Rfl:   •  tamsulosin (FLOMAX) 0.4 MG capsule 24 hr capsule, Take 1 capsule by mouth Daily. (Patient taking differently: Take 1 capsule by mouth 2 (Two) Times a Day.), Disp: 90 capsule, Rfl: 5  •  Testosterone (AXIRON) 30 MG/ACT solution, Apply to underarms daily, Disp: 90 mL, Rfl: 5  •  vardenafil (LEVITRA) 20 MG tablet, Take 1 tablet by mouth As Needed for erectile  "dysfunction., Disp: 10 tablet, Rfl: 11    Past Medical History:   Diagnosis Date   • History of transfusion 1986   • Hypertension    • Nephrolithiasis    • Shoulder pain        Past Surgical History:   Procedure Laterality Date   • BELOW KNEE LEG AMPUTATION Right    • LYMPH NODE BIOPSY     • TOTAL SHOULDER ARTHROPLASTY W/ DISTAL CLAVICLE EXCISION Right 1/22/2019    Procedure: RIGHT  REVERSE TOTAL SHOULDER  ARTHROPLASTY;  Surgeon: Donnie Frias MD;  Location:  [facility];  Service: Orthopedics   • URETEROSCOPY LASER LITHOTRIPSY WITH STENT INSERTION Left 4/25/2019    Procedure: CYSTOSCOPY LEFT URETERAL BALLOON DILATION AND URETEROSCOPY LASER LITHOTRIPSY WITH LEFT STENT INSERTION;  Surgeon: Tyrel Hilario MD;  Location:  [facility];  Service: Urology       Social History     Socioeconomic History   • Marital status:      Spouse name: Not on file   • Number of children: Not on file   • Years of education: Not on file   • Highest education level: Not on file   Tobacco Use   • Smoking status: Current Some Day Smoker   • Smokeless tobacco: Never Used   • Tobacco comment: occassionally smokes trying to quit as of 4/16/2019   Substance and Sexual Activity   • Alcohol use: No   • Drug use: No   • Sexual activity: Defer       Family History   Problem Relation Age of Onset   • Kidney cancer Father    • No Known Problems Mother        Objective    Temp 98 °F (36.7 °C)   Ht 180.3 cm (70.98\")   Wt 113 kg (250 lb)   BMI 34.88 kg/m²     Physical Exam   Constitutional: He is oriented to person, place, and time. He appears well-developed and well-nourished. No distress.   Pulmonary/Chest: Effort normal.   Abdominal: Soft. He exhibits no distension and no mass. There is no tenderness. There is no rebound and no guarding. No hernia.   Neurological: He is alert and oriented to person, place, and time.   Skin: Skin is warm and dry. He is not diaphoretic.   Psychiatric: He has a normal mood and affect.   Vitals " reviewed.    KUB independent review    A KUB is available for me to review today.  The image is inspected for a bowel gas pattern and the general bone structure of the spine and pelvis. The kidneys are then inspected closely.  Renal outline is noted if identifiable. The kidney, collecting system, and anticipated path of the ureter are examined for calcifications including those in the true pelvis.  This film reveals:    On the right there is a single renal stone measuring 14 mm.    On the left there are no calcificaitons seen in the kidney or the expected course of the ureter. .            Results for orders placed or performed in visit on 12/27/19   POC Urinalysis Dipstick, Multipro   Result Value Ref Range    Color Yellow Yellow, Straw, Dark Yellow, Linda    Clarity, UA Clear Clear    Glucose, UA Negative Negative, 1000 mg/dL (3+) mg/dL    Bilirubin Negative Negative    Ketones, UA Negative Negative    Specific Gravity  1.025 1.005 - 1.030    Blood, UA Trace (A) Negative    pH, Urine 6.0 5.0 - 8.0    Protein, POC Trace (A) Negative mg/dL    Urobilinogen, UA Normal Normal    Nitrite, UA Negative Negative    Leukocytes Negative Negative     Assessment and Plan    Diagnoses and all orders for this visit:    Nephrolithiasis  -     POC Urinalysis Dipstick, Multipro         AUA symptom score is 8/35.  He does have frequency as well as incomplete emptying intermittency urgency straining and nocturia x1.     Former patient of Dr. Hilario's with BPH, low testosterone, nephrolithiasis, and erectile dysfunction.  Continue Flomax 0.8 as well as his Levitra.  I will continue his Axiron.      Regarding his stone disease I discussed ESWL versus observation he would like to continue to observe his 14mm right lower pole stone.  He will follow-up in 6 months with repeat labs

## 2019-12-27 ENCOUNTER — OFFICE VISIT (OUTPATIENT)
Dept: UROLOGY | Facility: CLINIC | Age: 64
End: 2019-12-27

## 2019-12-27 ENCOUNTER — HOSPITAL ENCOUNTER (OUTPATIENT)
Dept: GENERAL RADIOLOGY | Facility: HOSPITAL | Age: 64
Discharge: HOME | End: 2019-12-27
Admitting: UROLOGY

## 2019-12-27 VITALS — WEIGHT: 250 LBS | BODY MASS INDEX: 35 KG/M2 | HEIGHT: 71 IN | TEMPERATURE: 98 F

## 2019-12-27 DIAGNOSIS — N40.1 BENIGN PROSTATIC HYPERPLASIA WITH LOWER URINARY TRACT SYMPTOMS, SYMPTOM DETAILS UNSPECIFIED: ICD-10-CM

## 2019-12-27 DIAGNOSIS — E29.1 HYPOGONADISM, MALE: ICD-10-CM

## 2019-12-27 DIAGNOSIS — N20.0 NEPHROLITHIASIS: Primary | ICD-10-CM

## 2019-12-27 DIAGNOSIS — N28.1 RENAL CYST: ICD-10-CM

## 2019-12-27 DIAGNOSIS — E29.1 HYPOGONADISM IN MALE: ICD-10-CM

## 2019-12-27 DIAGNOSIS — N20.0 NEPHROLITHIASIS: ICD-10-CM

## 2019-12-27 DIAGNOSIS — N52.9 IMPOTENCE OF ORGANIC ORIGIN: ICD-10-CM

## 2019-12-27 PROCEDURE — 74018 RADEX ABDOMEN 1 VIEW: CPT

## 2019-12-27 PROCEDURE — 99214 OFFICE O/P EST MOD 30 MIN: CPT | Performed by: UROLOGY

## 2019-12-27 PROCEDURE — 81001 URINALYSIS AUTO W/SCOPE: CPT | Performed by: UROLOGY

## 2019-12-27 RX ORDER — TESTOSTERONE 30 MG/1.5ML
SOLUTION TOPICAL
Qty: 90 ML | Refills: 5 | Status: SHIPPED | OUTPATIENT
Start: 2019-12-27 | End: 2020-07-06 | Stop reason: SDUPTHER

## 2019-12-27 RX ORDER — VARDENAFIL HYDROCHLORIDE 20 MG/1
20 TABLET ORAL AS NEEDED
Qty: 10 TABLET | Refills: 11 | Status: SHIPPED | OUTPATIENT
Start: 2019-12-27 | End: 2020-07-06

## 2020-06-08 ENCOUNTER — TRANSCRIBE ORDERS (OUTPATIENT)
Dept: GENERAL RADIOLOGY | Facility: HOSPITAL | Age: 65
End: 2020-06-08

## 2020-06-08 ENCOUNTER — HOSPITAL ENCOUNTER (OUTPATIENT)
Dept: ULTRASOUND IMAGING | Facility: HOSPITAL | Age: 65
Discharge: HOME | End: 2020-06-08
Admitting: NURSE PRACTITIONER

## 2020-06-08 DIAGNOSIS — M79.89 SWELLING OF LIMB: Primary | ICD-10-CM

## 2020-06-08 DIAGNOSIS — M79.89 SWELLING OF LIMB: ICD-10-CM

## 2020-06-08 PROCEDURE — 93971 EXTREMITY STUDY: CPT

## 2020-06-22 ENCOUNTER — OFFICE VISIT (OUTPATIENT)
Dept: WOUND CARE | Facility: HOSPITAL | Age: 65
End: 2020-06-22

## 2020-06-22 PROCEDURE — 99212 OFFICE O/P EST SF 10 MIN: CPT | Performed by: NURSE PRACTITIONER

## 2020-06-22 PROCEDURE — G0463 HOSPITAL OUTPT CLINIC VISIT: HCPCS

## 2020-06-24 ENCOUNTER — RESULTS ENCOUNTER (OUTPATIENT)
Dept: UROLOGY | Facility: CLINIC | Age: 65
End: 2020-06-24

## 2020-06-24 DIAGNOSIS — E29.1 HYPOGONADISM, MALE: ICD-10-CM

## 2020-06-25 ENCOUNTER — RESULTS ENCOUNTER (OUTPATIENT)
Dept: UROLOGY | Facility: CLINIC | Age: 65
End: 2020-06-25

## 2020-06-25 DIAGNOSIS — E29.1 HYPOGONADISM, MALE: ICD-10-CM

## 2020-06-25 DIAGNOSIS — N40.1 BENIGN PROSTATIC HYPERPLASIA WITH LOWER URINARY TRACT SYMPTOMS, SYMPTOM DETAILS UNSPECIFIED: ICD-10-CM

## 2020-07-01 LAB
PSA SERPL-MCNC: 0.94 NG/ML (ref 0–4)
TESTOST SERPL-MCNC: 691 NG/DL (ref 264–916)

## 2020-07-02 NOTE — PROGRESS NOTES
Subjective    Mr. Rees is 64 y.o. male    Chief Complaint: BPH    History of Present Illness  Recurrent Nephrolithiasis  Patient is her for recurrent nephrolithiasis.  Location of stone is left distal ureter. Stones were found for workup of hematuria.  Pt is currently Asymptomatic.  Pt. Has had stone disease for severalyear(s). Risk factors for stone disease include none. Previous management of stone disease was Ureteroscopy.  Stone analysis was Calcium Oxalate Monohydrate.  Metabolic workup revealed none.  Current treatment regimen includes none.  Associated symptoms include none.      Lab Results   Component Value Date    PSA 0.936 06/30/2020    PSA 0.691 04/09/2019    PSA 1.230 04/10/2018         The following portions of the patient's history were reviewed and updated as appropriate: allergies, current medications, past family history, past medical history, past social history, past surgical history and problem list.    Review of Systems   Constitutional: Negative for chills and fever.   Gastrointestinal: Negative for abdominal pain, anal bleeding and blood in stool.   Genitourinary: Positive for frequency and urgency. Negative for dysuria and hematuria.         Current Outpatient Medications:   •  aspirin 81 MG EC tablet, Take 81 mg by mouth Daily., Disp: , Rfl:   •  losartan-hydrochlorothiazide (HYZAAR) 100-12.5 MG per tablet, Take 1 tablet by mouth Daily., Disp: , Rfl:   •  tamsulosin (FLOMAX) 0.4 MG capsule 24 hr capsule, Take 1 capsule by mouth Daily. (Patient taking differently: Take 1 capsule by mouth 2 (Two) Times a Day.), Disp: 90 capsule, Rfl: 5  •  Testosterone (Axiron) 30 MG/ACT solution, Apply to underarms daily, Disp: 90 mL, Rfl: 5  •  vardenafil (LEVITRA) 20 MG tablet, Take 1 tablet by mouth As Needed for Erectile Dysfunction., Disp: 10 tablet, Rfl: 11  •  albuterol sulfate  (90 Base) MCG/ACT inhaler, TAKE 2 PUFFS BY MOUTH EVERY 4 HOURS AS NEEDED FOR 10 DAYS, Disp: , Rfl:     Past Medical  "History:   Diagnosis Date   • History of transfusion 1986   • Hypertension    • Nephrolithiasis    • Shoulder pain        Past Surgical History:   Procedure Laterality Date   • BELOW KNEE LEG AMPUTATION Right    • LYMPH NODE BIOPSY     • TOTAL SHOULDER ARTHROPLASTY W/ DISTAL CLAVICLE EXCISION Right 1/22/2019    Procedure: RIGHT  REVERSE TOTAL SHOULDER  ARTHROPLASTY;  Surgeon: Donnie Frias MD;  Location:  [facility];  Service: Orthopedics   • URETEROSCOPY LASER LITHOTRIPSY WITH STENT INSERTION Left 4/25/2019    Procedure: CYSTOSCOPY LEFT URETERAL BALLOON DILATION AND URETEROSCOPY LASER LITHOTRIPSY WITH LEFT STENT INSERTION;  Surgeon: Tyrel Hilario MD;  Location:  [facility];  Service: Urology       Social History     Socioeconomic History   • Marital status:      Spouse name: Not on file   • Number of children: Not on file   • Years of education: Not on file   • Highest education level: Not on file   Tobacco Use   • Smoking status: Former Smoker   • Smokeless tobacco: Never Used   • Tobacco comment: occassionally smokes trying to quit as of 4/16/2019   Substance and Sexual Activity   • Alcohol use: No   • Drug use: No   • Sexual activity: Defer       Family History   Problem Relation Age of Onset   • Kidney cancer Father    • No Known Problems Mother        Objective    Temp 98.3 °F (36.8 °C)   Ht 180.3 cm (71\")   Wt 113 kg (250 lb)   BMI 34.87 kg/m²     Physical Exam   Constitutional: He is oriented to person, place, and time. He appears well-developed and well-nourished. No distress.   Pulmonary/Chest: Effort normal.   Abdominal: Soft. He exhibits no distension and no mass. There is no tenderness. There is no rebound and no guarding. No hernia.   Neurological: He is alert and oriented to person, place, and time.   Skin: Skin is warm and dry. He is not diaphoretic.   Psychiatric: He has a normal mood and affect.   Vitals reviewed.          Results for orders placed or performed in visit on " 07/06/20   POC Urinalysis Dipstick, Multipro   Result Value Ref Range    Color Yellow Yellow, Straw, Dark Yellow, Linda    Clarity, UA Clear Clear    Glucose, UA Negative Negative, 1000 mg/dL (3+) mg/dL    Bilirubin Negative Negative    Ketones, UA Negative Negative    Specific Gravity  1.025 1.005 - 1.030    Blood, UA Trace (A) Negative    pH, Urine 5.5 5.0 - 8.0    Protein, POC Trace (A) Negative mg/dL    Urobilinogen, UA Normal Normal    Nitrite, UA Negative Negative    Leukocytes Negative Negative   Bladder Scan interpretation  Estimation of residual urine via abdominal ultrasound  Residual Urine: 0 ml  Indication: BPH  Position: Supine  Examination: Incremental scanning of the suprapubic area using 3 MHz transducer using copious amounts of acoustic gel.   Findings: An anechoic area was demonstrated which represented the bladder, with measurement of residual urine as noted. I inspected this myself. In that the residual urine was stable or insignificant, no treatment will be necessary at this time.       Assessment and Plan    Diagnoses and all orders for this visit:    Benign prostatic hyperplasia with lower urinary tract symptoms, symptom details unspecified  -     POC Urinalysis Dipstick, Multipro    Hypogonadism, male  -     Testosterone (Axiron) 30 MG/ACT solution; Apply to underarms daily    Nephrolithiasis    Impotence of organic origin       Former patient of Dr. Hilario's with BPH, low testosterone, nephrolithiasis, and erectile dysfunction.    AUA symptom score is 16/35.  He does have frequency as well as incomplete emptying intermittency urgency straining and nocturia x1.  Continue Flomax 0.8.        I will continue his Axiron.       Regarding his stone disease I discussed ESWL versus observation he would like to continue to observe his 14mm right lower pole stone.      I am going to start him on injection therapy for his worsening erectile dysfunction.  He will call the office when he gets the  injection so he can get a test dose in the office.    He will follow-up in 6 months with repeat labs and KUB.

## 2020-07-06 ENCOUNTER — OFFICE VISIT (OUTPATIENT)
Dept: UROLOGY | Facility: CLINIC | Age: 65
End: 2020-07-06

## 2020-07-06 VITALS — WEIGHT: 250 LBS | TEMPERATURE: 98.3 F | HEIGHT: 71 IN | BODY MASS INDEX: 35 KG/M2

## 2020-07-06 DIAGNOSIS — N40.1 BENIGN PROSTATIC HYPERPLASIA WITH LOWER URINARY TRACT SYMPTOMS, SYMPTOM DETAILS UNSPECIFIED: Primary | ICD-10-CM

## 2020-07-06 DIAGNOSIS — E29.1 HYPOGONADISM, MALE: ICD-10-CM

## 2020-07-06 DIAGNOSIS — N52.9 IMPOTENCE OF ORGANIC ORIGIN: ICD-10-CM

## 2020-07-06 DIAGNOSIS — N20.0 NEPHROLITHIASIS: ICD-10-CM

## 2020-07-06 LAB
BILIRUB BLD-MCNC: NEGATIVE MG/DL
CLARITY, POC: CLEAR
COLOR UR: YELLOW
GLUCOSE UR STRIP-MCNC: NEGATIVE MG/DL
KETONES UR QL: NEGATIVE
LEUKOCYTE EST, POC: NEGATIVE
NITRITE UR-MCNC: NEGATIVE MG/ML
PH UR: 5.5 [PH] (ref 5–8)
PROT UR STRIP-MCNC: ABNORMAL MG/DL
RBC # UR STRIP: ABNORMAL /UL
SP GR UR: 1.02 (ref 1–1.03)
UROBILINOGEN UR QL: NORMAL

## 2020-07-06 PROCEDURE — 81001 URINALYSIS AUTO W/SCOPE: CPT | Performed by: UROLOGY

## 2020-07-06 PROCEDURE — 51798 US URINE CAPACITY MEASURE: CPT | Performed by: UROLOGY

## 2020-07-06 PROCEDURE — 99214 OFFICE O/P EST MOD 30 MIN: CPT | Performed by: UROLOGY

## 2020-07-06 RX ORDER — ALBUTEROL SULFATE 90 UG/1
AEROSOL, METERED RESPIRATORY (INHALATION)
COMMUNITY
Start: 2020-06-05 | End: 2021-05-17 | Stop reason: SDUPTHER

## 2020-07-06 RX ORDER — TAMSULOSIN HYDROCHLORIDE 0.4 MG/1
2 CAPSULE ORAL DAILY
Qty: 180 CAPSULE | Refills: 3 | Status: SHIPPED | OUTPATIENT
Start: 2020-07-06 | End: 2021-01-06 | Stop reason: SDUPTHER

## 2020-07-06 RX ORDER — TESTOSTERONE 30 MG/1.5ML
SOLUTION TOPICAL
Qty: 90 ML | Refills: 5 | Status: SHIPPED | OUTPATIENT
Start: 2020-07-06 | End: 2021-01-06 | Stop reason: SDUPTHER

## 2020-07-07 ENCOUNTER — TELEPHONE (OUTPATIENT)
Dept: UROLOGY | Facility: CLINIC | Age: 65
End: 2020-07-07

## 2020-07-07 NOTE — TELEPHONE ENCOUNTER
Patient called today and left a message about wanting to hold off on the injections that was talked about in yesterdays office visit with dr. Sosa. He will let us know when he would like to start them. His call back number is [phone] if there is any questions.

## 2020-08-13 ENCOUNTER — TELEPHONE (OUTPATIENT)
Dept: VASCULAR SURGERY | Facility: CLINIC | Age: 65
End: 2020-08-13

## 2020-08-28 ENCOUNTER — TELEPHONE (OUTPATIENT)
Dept: VASCULAR SURGERY | Facility: CLINIC | Age: 65
End: 2020-08-28

## 2020-08-28 NOTE — TELEPHONE ENCOUNTER
Spoke with  Cabrera reminding him of his appointment for Monday, August 31st, 2020 at 1 pm with Felicia MOJICA. Mr Rees confirmed he would be here.

## 2020-08-31 ENCOUNTER — OFFICE VISIT (OUTPATIENT)
Dept: VASCULAR SURGERY | Facility: CLINIC | Age: 65
End: 2020-08-31

## 2020-08-31 VITALS
DIASTOLIC BLOOD PRESSURE: 86 MMHG | WEIGHT: 252 LBS | OXYGEN SATURATION: 97 % | BODY MASS INDEX: 35.28 KG/M2 | HEART RATE: 72 BPM | HEIGHT: 71 IN | SYSTOLIC BLOOD PRESSURE: 140 MMHG

## 2020-08-31 DIAGNOSIS — M79.89 LEG SWELLING: Primary | ICD-10-CM

## 2020-08-31 DIAGNOSIS — I10 ESSENTIAL HYPERTENSION: ICD-10-CM

## 2020-08-31 PROCEDURE — 99214 OFFICE O/P EST MOD 30 MIN: CPT | Performed by: NURSE PRACTITIONER

## 2020-08-31 NOTE — PROGRESS NOTES
08/31/2020      Ngozi Haines APRN  [address] [place] [address]    Roc Rees  1955    Chief Complaint   Patient presents with   • Establish Care     Referred over by Ngozi MOJICA for Peripheral Vascular Disease. Test [id number]  pad venous lower ext left. Patient denies any stroke like symptoms.    • other     Patient states for several years about 1 time a year it would flare up but here lately it has happened more frequent. Patient states Left Leg is red, tender, tight, and somewhat swollen.    • Smoker/Non-Smoker     Patient is Former Smoker quit 2019   • Med Management     Verbally verified medications with patient. Mr Rees verbalized all medications are correct and up to date.        Dear GALINA Quintana:      HPI  I had the pleasure of seeing your patient Roc Rees in the office today.  Thank you kindly for this consultation.  As you recall, Roc Rees is a 65 y.o.  male who you are currently following for routine health maintenance.  He does have a history of right below-knee amputation following trauma.  Previously greater saphenous vein stripped from the knee to ankle in an effort to save the right lower extremity.   He has had previous bouts of cellulitis to the left lower extremity with increased swelling.  Venous duplex showed no evidence of DVT.    Past Medical History:   Diagnosis Date   • History of transfusion 1986   • Hypertension    • Nephrolithiasis    • Shoulder pain        Past Surgical History:   Procedure Laterality Date   • BELOW KNEE LEG AMPUTATION Right    • LYMPH NODE BIOPSY     • TOTAL SHOULDER ARTHROPLASTY W/ DISTAL CLAVICLE EXCISION Right 1/22/2019    Procedure: RIGHT  REVERSE TOTAL SHOULDER  ARTHROPLASTY;  Surgeon: Donnie Frias MD;  Location: [facility];  Service: Orthopedics   • URETEROSCOPY LASER LITHOTRIPSY WITH STENT INSERTION Left 4/25/2019    Procedure: CYSTOSCOPY LEFT URETERAL BALLOON DILATION AND URETEROSCOPY LASER LITHOTRIPSY WITH LEFT STENT  "INSERTION;  Surgeon: Tyrel Hilario MD;  Location: [facility] OR;  Service: Urology       Family History   Problem Relation Age of Onset   • Kidney cancer Father    • No Known Problems Mother        Social History     Socioeconomic History   • Marital status:      Spouse name: Not on file   • Number of children: Not on file   • Years of education: Not on file   • Highest education level: Not on file   Tobacco Use   • Smoking status: Former Smoker   • Smokeless tobacco: Never Used   • Tobacco comment: occassionally smokes trying to quit as of 4/16/2019   Substance and Sexual Activity   • Alcohol use: No   • Drug use: No   • Sexual activity: Defer       No Known Allergies      Current Outpatient Medications:   •  albuterol sulfate  (90 Base) MCG/ACT inhaler, TAKE 2 PUFFS BY MOUTH EVERY 4 HOURS AS NEEDED FOR 10 DAYS, Disp: , Rfl:   •  aspirin 81 MG EC tablet, Take 81 mg by mouth Daily., Disp: , Rfl:   •  losartan-hydrochlorothiazide (HYZAAR) 100-12.5 MG per tablet, Take 1 tablet by mouth Daily., Disp: , Rfl:   •  tamsulosin (FLOMAX) 0.4 MG capsule 24 hr capsule, Take 2 capsules by mouth Daily., Disp: 180 capsule, Rfl: 3  •  Testosterone (Axiron) 30 MG/ACT solution, Apply to underarms daily, Disp: 90 mL, Rfl: 5     Review of Systems   Constitutional: Negative.    HENT: Negative.    Eyes: Negative.    Respiratory: Negative.    Cardiovascular: Positive for leg swelling.   Gastrointestinal: Negative.    Endocrine: Negative.    Genitourinary: Negative.    Musculoskeletal: Negative.         Right below-knee amputation, walks with prosthetic   Skin: Positive for color change. Negative for wound.   Allergic/Immunologic: Negative.    Neurological: Negative.    Hematological: Negative.    Psychiatric/Behavioral: Negative.    All other systems reviewed and are negative.      /86 (BP Location: Right arm, Patient Position: Sitting, Cuff Size: Adult)   Pulse 72   Ht 180.3 cm (71\")   Wt 114 kg (252 lb)   SpO2 " 97%   BMI 35.15 kg/m²   Physical Exam   Constitutional: He is oriented to person, place, and time. He appears well-developed and well-nourished.   HENT:   Head: Normocephalic and atraumatic.   Eyes: Pupils are equal, round, and reactive to light. No scleral icterus.   Neck: Normal range of motion. Neck supple. No thyromegaly present.   Cardiovascular: Normal rate, regular rhythm and normal heart sounds.   Right below-knee amputation  Stasis dermatitis to left lower extremity, mild swelling, varicose veins to left lower extremity   Pulmonary/Chest: Effort normal and breath sounds normal.   Abdominal: Soft. Bowel sounds are normal.   Musculoskeletal: Normal range of motion.   Neurological: He is alert and oriented to person, place, and time.   Skin: Skin is warm and dry.   Psychiatric: He has a normal mood and affect. His behavior is normal. Judgment and thought content normal.   Nursing note and vitals reviewed.      No results found.    Patient Active Problem List   Diagnosis   • BPH (benign prostatic hypertrophy) with urinary obstruction   • Impotence due to erectile dysfunction   • Hypogonadism in male   • Erectile dysfunction   • Benign prostatic hyperplasia with lower urinary tract symptoms   • Rotator cuff arthropathy of right shoulder   • Primary osteoarthritis of right shoulder   • Microscopic hematuria   • Renal stone   • Hydronephrosis with renal and ureteral calculus obstruction   • Ureteral stone   • Renal cyst   • Ureteral stone with hydronephrosis   • Hypertension         ICD-10-CM ICD-9-CM   1. Leg swelling M79.89 729.81   2. Essential hypertension I10 401.9         Plan: After thoroughly evaluating Roc Rees, I believe the best course of action is to initially remain conservative from a vascular standpoint.  I will give the patient a prescription for compression stockings in the 20-30 mm pressure gradient range.  I did instruct the patient on how to wear these on a daily basis.  I would like him to  keep his leg elevated when he is not on them, and keep his leg well moisturized.  We will see the patient back in 2 weeks with a venous valvular insufficiency study of the left lower extremity given his recurrent cellulitis.  I do not see any evidence of cellulitis today.  If the testing does show significant venous reflux, the patient may be a great candidate for endovenous closure as the patient's symptoms have significantly impacted their activities of daily living.  The patient can continue taking their current medication regimen as previously planned.  This was all discussed in full with complete understanding.    Thank you for allowing me to participate in the care of your patient.  Please do not hesitate with any questions or concerns.  I will keep you aware of any further encounters with Roc Rees.        Sincerely yours,         GALINA Jenkins

## 2020-09-01 ENCOUNTER — TELEPHONE (OUTPATIENT)
Dept: VASCULAR SURGERY | Facility: CLINIC | Age: 65
End: 2020-09-01

## 2020-09-01 NOTE — TELEPHONE ENCOUNTER
Left msg for pt about [facility] and reminded pt of appt at 215 with Dr. Vargas. Mailing reminder.

## 2020-09-15 ENCOUNTER — OFFICE VISIT (OUTPATIENT)
Dept: VASCULAR SURGERY | Facility: CLINIC | Age: 65
End: 2020-09-15

## 2020-09-15 ENCOUNTER — HOSPITAL ENCOUNTER (OUTPATIENT)
Dept: ULTRASOUND IMAGING | Facility: HOSPITAL | Age: 65
Discharge: HOME | End: 2020-09-15
Admitting: NURSE PRACTITIONER

## 2020-09-15 VITALS
DIASTOLIC BLOOD PRESSURE: 84 MMHG | BODY MASS INDEX: 36.08 KG/M2 | HEIGHT: 70 IN | HEART RATE: 82 BPM | OXYGEN SATURATION: 96 % | WEIGHT: 252 LBS | SYSTOLIC BLOOD PRESSURE: 134 MMHG

## 2020-09-15 DIAGNOSIS — M79.89 LEG SWELLING: ICD-10-CM

## 2020-09-15 DIAGNOSIS — I10 ESSENTIAL HYPERTENSION: ICD-10-CM

## 2020-09-15 DIAGNOSIS — I87.322 CHRONIC VENOUS HYPERTENSION WITH INFLAMMATION INVOLVING LEFT SIDE: Primary | ICD-10-CM

## 2020-09-15 PROCEDURE — 93971 EXTREMITY STUDY: CPT | Performed by: SURGERY

## 2020-09-15 PROCEDURE — 99214 OFFICE O/P EST MOD 30 MIN: CPT | Performed by: SURGERY

## 2020-09-15 PROCEDURE — 93971 EXTREMITY STUDY: CPT

## 2020-09-15 NOTE — PROGRESS NOTES
"9/15/2020        Naty Ryan, APRN   [address] [place] [address]    Roc Rees  1955    Chief Complaint   Patient presents with   • Follow-up     2 Week Follow Up For Leg Swelling. Test [id number] US pad venous lower ext left. Patient denies any stroke like symptoms.    • Smoker/Non-Smoker     Patient is Former Smoker - Quit 04/2019   • Med Management     Verbally verified medications with patient. Mr Rees Verbalized all medications are correct and up to date.        Dear No ref. provider found:      HPI  I had the pleasure of seeing your patient Roc Rees in the office today.  As you recall, Roc Rees is a 65 y.o.  male who you are currently following for routine health maintenance.  He does have a history of right below-knee amputation following trauma.  Previously greater saphenous vein stripped from the knee to ankle in an effort to save the right lower extremity.   He has had previous bouts of cellulitis to the left lower extremity with increased swelling.  Venous duplex showed no evidence of DVT.  He did have noninvasive testing performed today, which I did review in office.    Review of Systems   Constitutional: Negative.    HENT: Positive for hearing loss.    Eyes: Negative.    Respiratory: Negative.    Cardiovascular: Positive for leg swelling.   Gastrointestinal: Negative.    Endocrine: Negative.    Genitourinary: Negative.    Musculoskeletal: Negative.         Right below-knee amputation, walks with prosthetic   Skin: Positive for color change. Negative for wound.   Allergic/Immunologic: Negative.    Neurological: Negative.    Hematological: Negative.    Psychiatric/Behavioral: Negative.    All other systems reviewed and are negative.      /84 (BP Location: Right arm, Patient Position: Sitting, Cuff Size: Adult)   Pulse 82   Ht 177.8 cm (70\")   Wt 114 kg (252 lb)   SpO2 96%   BMI 36.16 kg/m²   Physical Exam  Constitutional:       General: He is not in acute distress.     " Appearance: Normal appearance. He is well-developed. He is not diaphoretic.   HENT:      Head: Normocephalic and atraumatic.   Eyes:      Pupils: Pupils are equal, round, and reactive to light.   Neck:      Vascular: No carotid bruit or JVD.   Cardiovascular:      Rate and Rhythm: Normal rate and regular rhythm.      Pulses: Normal pulses.           Femoral pulses are 2+ on the right side and 2+ on the left side.       Popliteal pulses are 2+ on the right side and 2+ on the left side.        Dorsalis pedis pulses are 2+ on the left side.        Posterior tibial pulses are 2+ on the left side.      Heart sounds: Normal heart sounds, S1 normal and S2 normal. No murmur. No friction rub. No gallop.    Pulmonary:      Effort: Pulmonary effort is normal.      Breath sounds: Normal breath sounds.   Abdominal:      General: Bowel sounds are normal. There is no abdominal bruit.      Palpations: Abdomen is soft.      Tenderness: There is no abdominal tenderness.   Musculoskeletal: Normal range of motion.         General: Deformity (right BKA) present.   Feet:      Right foot:      Amputation: Right leg is amputated below knee.   Skin:     General: Skin is warm and dry.   Neurological:      Mental Status: He is alert and oriented to person, place, and time.      Cranial Nerves: No cranial nerve deficit.   Psychiatric:         Mood and Affect: Mood normal.         Behavior: Behavior normal.         Thought Content: Thought content normal.         Judgment: Judgment normal.             Patient Active Problem List   Diagnosis   • BPH (benign prostatic hypertrophy) with urinary obstruction   • Impotence due to erectile dysfunction   • Hypogonadism in male   • Erectile dysfunction   • Benign prostatic hyperplasia with lower urinary tract symptoms   • Rotator cuff arthropathy of right shoulder   • Primary osteoarthritis of right shoulder   • Microscopic hematuria   • Renal stone   • Hydronephrosis with renal and ureteral calculus  obstruction   • Ureteral stone   • Renal cyst   • Ureteral stone with hydronephrosis   • Hypertension         ICD-10-CM ICD-9-CM   1. Chronic venous hypertension with inflammation involving left side  I87.322 459.32   2. Essential hypertension  I10 401.9         Plan: After thoroughly evaluating Roc Rees, I believe the best course of action is to proceed with endovenous closure left lower extremity greater saphenous vein using Venaseal.  Risks of saphenous vein closure include, but are not limited to, bleeding, infection, vessel damage, sensitivity reaction, DVT, phlebitis, and pulmonary embolus.  The patient understands these risks and wishes to proceed with procedure.  I would like him to continue wearing compression stocking on his left leg as previously instructed.  I did review his testing which does show significant venous insufficiency to his left lower extremity from the groin to midcalf.  The patient can continue taking their current medication regimen as previously planned.  This was all discussed in full with complete understanding.    Thank you for allowing me to participate in the care of your patient.  Please do not hesitate with any questions or concerns.  I will keep you aware of any further encounters with Roc Rees.        Sincerely yours,         GALINA Jenkins

## 2020-09-16 ENCOUNTER — TELEPHONE (OUTPATIENT)
Dept: VASCULAR SURGERY | Facility: CLINIC | Age: 65
End: 2020-09-16

## 2020-09-16 NOTE — TELEPHONE ENCOUNTER
Spoke with patient to determine what day he would like for surgery. At this time patient will have to call me back and let me know.

## 2020-09-18 ENCOUNTER — TELEPHONE (OUTPATIENT)
Dept: VASCULAR SURGERY | Facility: CLINIC | Age: 65
End: 2020-09-18

## 2020-09-18 NOTE — TELEPHONE ENCOUNTER
PT called and waned to discuss surgery . He would like to have OCT 12TH with Dr. Vargas. I explained once I get the authorizations I would give him a call back and schedule. Pt expressed understanding.

## 2020-09-25 ENCOUNTER — TELEPHONE (OUTPATIENT)
Dept: VASCULAR SURGERY | Facility: CLINIC | Age: 65
End: 2020-09-25

## 2020-09-25 PROBLEM — I87.322 CHRONIC VENOUS HYPERTENSION WITH INFLAMMATION INVOLVING LEFT SIDE: Status: ACTIVE | Noted: 2020-09-25

## 2020-09-25 NOTE — TELEPHONE ENCOUNTER
Spoke with patient and advised that we had his procedure with Dr. Vargas scheduled for 10/12/2020.  Patient requested that all information be mailed to the address on file. Mailed all information.  Patient pre work is scheduled for 10/9/2020 at 0945 am.  Patient procedure is scheduled for 10/12/2020 at 600 am.  Patient advised of location time and prep.

## 2020-10-05 ENCOUNTER — TRANSCRIBE ORDERS (OUTPATIENT)
Dept: ADMINISTRATIVE | Facility: HOSPITAL | Age: 65
End: 2020-10-05

## 2020-10-05 DIAGNOSIS — Z01.818 PREOP TESTING: Primary | ICD-10-CM

## 2020-10-09 ENCOUNTER — TELEPHONE (OUTPATIENT)
Dept: VASCULAR SURGERY | Facility: CLINIC | Age: 65
End: 2020-10-09

## 2020-10-09 ENCOUNTER — APPOINTMENT (OUTPATIENT)
Dept: PREADMISSION TESTING | Facility: HOSPITAL | Age: 65
End: 2020-10-09

## 2020-10-09 ENCOUNTER — LAB (OUTPATIENT)
Dept: LAB | Facility: HOSPITAL | Age: 65
End: 2020-10-09

## 2020-10-09 VITALS
SYSTOLIC BLOOD PRESSURE: 153 MMHG | BODY MASS INDEX: 36.42 KG/M2 | RESPIRATION RATE: 18 BRPM | DIASTOLIC BLOOD PRESSURE: 79 MMHG | HEART RATE: 78 BPM | OXYGEN SATURATION: 94 % | HEIGHT: 70 IN | WEIGHT: 254.41 LBS

## 2020-10-09 LAB
ANION GAP SERPL CALCULATED.3IONS-SCNC: 5 MMOL/L (ref 5–15)
BUN SERPL-MCNC: 21 MG/DL (ref 8–23)
BUN/CREAT SERPL: 21.2 (ref 7–25)
CALCIUM SPEC-SCNC: 9.4 MG/DL (ref 8.6–10.5)
CHLORIDE SERPL-SCNC: 100 MMOL/L (ref 98–107)
CO2 SERPL-SCNC: 31 MMOL/L (ref 22–29)
CREAT SERPL-MCNC: 0.99 MG/DL (ref 0.76–1.27)
DEPRECATED RDW RBC AUTO: 43.1 FL (ref 37–54)
ERYTHROCYTE [DISTWIDTH] IN BLOOD BY AUTOMATED COUNT: 18.4 % (ref 12.3–15.4)
GFR SERPL CREATININE-BSD FRML MDRD: 76 ML/MIN/1.73
GLUCOSE SERPL-MCNC: 95 MG/DL (ref 65–99)
HCT VFR BLD AUTO: 56.4 % (ref 37.5–51)
HGB BLD-MCNC: 16.6 G/DL (ref 13–17.7)
MCH RBC QN AUTO: 22.1 PG (ref 26.6–33)
MCHC RBC AUTO-ENTMCNC: 29.4 G/DL (ref 31.5–35.7)
MCV RBC AUTO: 75.2 FL (ref 79–97)
PLATELET # BLD AUTO: 332 10*3/MM3 (ref 140–450)
PMV BLD AUTO: 9.1 FL (ref 6–12)
POTASSIUM SERPL-SCNC: 4.1 MMOL/L (ref 3.5–5.2)
RBC # BLD AUTO: 7.5 10*6/MM3 (ref 4.14–5.8)
SODIUM SERPL-SCNC: 136 MMOL/L (ref 136–145)
WBC # BLD AUTO: 6.53 10*3/MM3 (ref 3.4–10.8)

## 2020-10-09 PROCEDURE — 80048 BASIC METABOLIC PNL TOTAL CA: CPT | Performed by: SURGERY

## 2020-10-09 PROCEDURE — 85027 COMPLETE CBC AUTOMATED: CPT | Performed by: SURGERY

## 2020-10-09 PROCEDURE — U0003 INFECTIOUS AGENT DETECTION BY NUCLEIC ACID (DNA OR RNA); SEVERE ACUTE RESPIRATORY SYNDROME CORONAVIRUS 2 (SARS-COV-2) (CORONAVIRUS DISEASE [COVID-19]), AMPLIFIED PROBE TECHNIQUE, MAKING USE OF HIGH THROUGHPUT TECHNOLOGIES AS DESCRIBED BY CMS-2020-01-R: HCPCS | Performed by: SURGERY

## 2020-10-09 PROCEDURE — 36415 COLL VENOUS BLD VENIPUNCTURE: CPT

## 2020-10-09 PROCEDURE — C9803 HOPD COVID-19 SPEC COLLECT: HCPCS | Performed by: SURGERY

## 2020-10-09 PROCEDURE — 93010 ELECTROCARDIOGRAM REPORT: CPT | Performed by: INTERNAL MEDICINE

## 2020-10-09 PROCEDURE — 93005 ELECTROCARDIOGRAM TRACING: CPT

## 2020-10-09 NOTE — DISCHARGE INSTRUCTIONS
DAY OF SURGERY INSTRUCTIONS        YOUR SURGEON: ***BAKARI BIC    PROCEDURE: ***SAPHENOUS VEIN CLOSURE WITH  VENASEAL    DATE OF SURGERY: ***OCTOBER12,2020    ARRIVAL TIME: AS DIRECTED BY OFFICE    YOU MAY TAKE THE FOLLOWING MEDICATION(S) THE MORNING OF SURGERY WITH A SIP OF WATER: ***NONE      ALL OTHER HOME MEDICATION CHECK WITH YOUR PHYSICIAN      DO NOT TAKE ANY ERECTILE DYSFUNCTION MEDICATIONS (EX: CIALIS, VIAGRA) 24 HOURS PRIOR TO SURGERY                      MANAGING PAIN AFTER SURGERY    We know you are probably wondering what your pain will be like after surgery.  Following surgery it is unrealistic to expect you will not have pain.   Pain is how our bodies let us know that something is wrong or cautions us to be careful.  That said, our goal is to make your pain tolerable.    Methods we may use to treat your pain include (oral or IV medications, PCAs, epidurals, nerve blocks, etc.)   While some procedures require IV pain medications for a short time after surgery, transitioning to pain medications by mouth allows for better management of pain.   Your nurse will encourage you to take oral pain medications whenever possible.  IV medications work almost immediately, but only last a short while.  Taking medications by mouth allows for a more constant level of medication in your blood stream for a longer period of time.      Once your pain is out of control it is harder to get back under control.  It is important you are aware when your next dose of pain medication is due.  If you are admitted, your nurse may write the time of your next dose on the white board in your room to help you remember.      We are interested in your pain and encourage you to inform us about aggravating factors during your visit.   Many times a simple repositioning every few hours can make a big difference.    If your physician says it is okay, do not let your pain prevent you from getting out of bed. Be sure to call your nurse  for assistance prior to getting up so you do not fall.      Before surgery, please decide your tolerable pain goal.  These faces help describe the pain ratings we use on a 0-10 scale.   Be prepared to tell us your goal and whether or not you take pain or anxiety medications at home.          BEFORE YOU COME TO THE HOSPITAL  (Pre-op instructions)  • Do not eat, drink, smoke or chew gum after midnight the night before surgery.  This also includes no mints.  • Morning of surgery take only the medicines you have been instructed with a sip of water unless otherwise instructed  by your physician.  • Do not shave, wear makeup or dark nail polish.  • Remove all jewelry including rings.  • Leave anything you consider valuable at home.  • Leave your suitcase in the car until after your surgery.  • Bring the following with you if applicable:  o Picture ID and insurance, Medicare or Medicaid cards  o Co-pay/deductible required by insurance (cash, check, credit card)  o Copy of advance directive, living will or power-of- documents if not brought to PAT  o CPAP or BIPAP mask and tubing  o Relaxation aids ( book, magazine), etc.  o Hearing aids                        ON THE DAY OF SURGERY  · On the day of surgery check in at registration located at the main entrance of the hospital.   ? You will be registered and given a beeper with instructions where to wait in the main lobby.  ? When your beeper lights up and vibrates a member of the Outpatient Surgery staff will meet you at the double doors under the stair steps and escort you to your preoperative room.   · You may have cloth compression devices placed on your legs. These help to prevent blood clots and reduce swelling in your legs.  · An IV may be inserted into one of your veins.  · In the operating room, you may be given one or more of the following:  ? A medicine to help you relax (sedative).  ? A medicine to numb the area (local anesthetic).  ? A medicine to make  "you fall asleep (general anesthetic).  ? A medicine that is injected into an area of your body to numb everything below the injection site (regional anesthetic).  · Your surgical site will be marked or identified.  · You may be given an antibiotic through your IV to help prevent infection.  Contact a health care provider if you:  · Develop a fever of more than 100.4°F (38°C) or other feelings of illness during the 48 hours before your surgery.  · Have symptoms that get worse.  Have questions or concerns about your surgery    General Anesthesia/Surgery, Adult  General anesthesia is the use of medicines to make a person \"go to sleep\" (unconscious) for a medical procedure. General anesthesia must be used for certain procedures, and is often recommended for procedures that:  · Last a long time.  · Require you to be still or in an unusual position.  · Are major and can cause blood loss.  The medicines used for general anesthesia are called general anesthetics. As well as making you unconscious for a certain amount of time, these medicines:  · Prevent pain.  · Control your blood pressure.  · Relax your muscles.  Tell a health care provider about:  · Any allergies you have.  · All medicines you are taking, including vitamins, herbs, eye drops, creams, and over-the-counter medicines.  · Any problems you or family members have had with anesthetic medicines.  · Types of anesthetics you have had in the past.  · Any blood disorders you have.  · Any surgeries you have had.  · Any medical conditions you have.  · Any recent upper respiratory, chest, or ear infections.  · Any history of:  ? Heart or lung conditions, such as heart failure, sleep apnea, asthma, or chronic obstructive pulmonary disease (COPD).  ?  service.  ? Depression or anxiety.  · Any tobacco or drug use, including marijuana or alcohol use.  · Whether you are pregnant or may be pregnant.  What are the risks?  Generally, this is a safe procedure. However, " problems may occur, including:  · Allergic reaction.  · Lung and heart problems.  · Inhaling food or liquid from the stomach into the lungs (aspiration).  · Nerve injury.  · Air in the bloodstream, which can lead to stroke.  · Extreme agitation or confusion (delirium) when you wake up from the anesthetic.  · Waking up during your procedure and being unable to move. This is rare.  These problems are more likely to develop if you are having a major surgery or if you have an advanced or serious medical condition. You can prevent some of these complications by answering all of your health care provider's questions thoroughly and by following all instructions before your procedure.  General anesthesia can cause side effects, including:  · Nausea or vomiting.  · A sore throat from the breathing tube.  · Hoarseness.  · Wheezing or coughing.  · Shaking chills.  · Tiredness.  · Body aches.  · Anxiety.  · Sleepiness or drowsiness.  · Confusion or agitation.  RISKS AND COMPLICATIONS OF SURGERY  Your health care provider will discuss possible risks and complications with you before surgery. Common risks and complications include:    · Problems due to the use of anesthetics.  · Blood loss and replacement (does not apply to minor surgical procedures).  · Temporary increase in pain due to surgery.  · Uncorrected pain or problems that the surgery was meant to correct.  · Infection.  · New damage.    What happens before the procedure?    Medicines  Ask your health care provider about:  · Changing or stopping your regular medicines. This is especially important if you are taking diabetes medicines or blood thinners.  · Taking medicines such as aspirin and ibuprofen. These medicines can thin your blood. Do not take these medicines unless your health care provider tells you to take them.  · Taking over-the-counter medicines, vitamins, herbs, and supplements. Do not take these during the week before your procedure unless your health  care provider approves them.  General instructions  · Starting 3-6 weeks before the procedure, do not use any products that contain nicotine or tobacco, such as cigarettes and e-cigarettes. If you need help quitting, ask your health care provider.  · If you brush your teeth on the morning of the procedure, make sure to spit out all of the toothpaste.  · Tell your health care provider if you become ill or develop a cold, cough, or fever.  · If instructed by your health care provider, bring your sleep apnea device with you on the day of your surgery (if applicable).  · Ask your health care provider if you will be going home the same day, the following day, or after a longer hospital stay.  ? Plan to have someone take you home from the hospital or clinic.  ? Plan to have a responsible adult care for you for at least 24 hours after you leave the hospital or clinic. This is important.  What happens during the procedure?  · You will be given anesthetics through both of the following:  ? A mask placed over your nose and mouth.  ? An IV in one of your veins.  · You may receive a medicine to help you relax (sedative).  · After you are unconscious, a breathing tube may be inserted down your throat to help you breathe. This will be removed before you wake up.  · An anesthesia specialist will stay with you throughout your procedure. He or she will:  ? Keep you comfortable and safe by continuing to give you medicines and adjusting the amount of medicine that you get.  ? Monitor your blood pressure, pulse, and oxygen levels to make sure that the anesthetics do not cause any problems.  The procedure may vary among health care providers and hospitals.  What happens after the procedure?  · Your blood pressure, temperature, heart rate, breathing rate, and blood oxygen level will be monitored until the medicines you were given have worn off.  · You will wake up in a recovery area. You may wake up slowly.  · If you feel anxious or  agitated, you may be given medicine to help you calm down.  · If you will be going home the same day, your health care provider may check to make sure you can walk, drink, and urinate.  · Your health care provider will treat any pain or side effects you have before you go home.  · Do not drive for 24 hours if you were given a sedative.  Summary  · General anesthesia is used to keep you still and prevent pain during a procedure.  · It is important to tell your healthcare provider about your medical history and any surgeries you have had, and previous experience with anesthesia.  · Follow your healthcare provider’s instructions about when to stop eating, drinking, or taking certain medicines before your procedure.  · Plan to have someone take you home from the hospital or clinic.  This information is not intended to replace advice given to you by your health care provider. Make sure you discuss any questions you have with your health care provider.  Document Released: 03/26/2009 Document Revised: 08/03/2018 Document Reviewed: 08/03/2018  [employer] Interactive Patient Education © 2019 [employer]       Fall Prevention in Hospitals, Adult  As a hospital patient, your condition and the treatments you receive can increase your risk for falls. Some additional risk factors for falls in a hospital include:  · Being in an unfamiliar environment.  · Being on bed rest.  · Your surgery.  · Taking certain medicines.  · Your tubing requirements, such as intravenous (IV) therapy or catheters.  It is important that you learn how to decrease fall risks while at the hospital. Below are important tips that can help prevent falls.  SAFETY TIPS FOR PREVENTING FALLS  Talk about your risk of falling.  · Ask your health care provider why you are at risk for falling. Is it your medicine, illness, tubing placement, or something else?  · Make a plan with your health care provider to keep you safe from falls.  · Ask your health care provider  or pharmacist about side effects of your medicines. Some medicines can make you dizzy or affect your coordination.  Ask for help.  · Ask for help before getting out of bed. You may need to press your call button.  · Ask for assistance in getting safely to the toilet.  · Ask for a walker or cane to be put at your bedside. Ask that most of the side rails on your bed be placed up before your health care provider leaves the room.  · Ask family or friends to sit with you.  · Ask for things that are out of your reach, such as your glasses, hearing aids, telephone, bedside table, or call button.  Follow these tips to avoid falling:  · Stay lying or seated, rather than standing, while waiting for help.  · Wear rubber-soled slippers or shoes whenever you walk in the hospital.  · Avoid quick, sudden movements.  ¨ Change positions slowly.  ¨ Sit on the side of your bed before standing.  ¨ Stand up slowly and wait before you start to walk.  · Let your health care provider know if there is a spill on the floor.  · Pay careful attention to the medical equipment, electrical cords, and tubes around you.  · When you need help, use your call button by your bed or in the bathroom. Wait for one of your health care providers to help you.  · If you feel dizzy or unsure of your footing, return to bed and wait for assistance.  · Avoid being distracted by the TV, telephone, or another person in your room.  · Do not lean or support yourself on rolling objects, such as IV poles or bedside tables.     This information is not intended to replace advice given to you by your health care provider. Make sure you discuss any questions you have with your health care provider.     Document Released: 12/15/2001 Document Revised: 01/08/2016 Document Reviewed: 08/25/2013  [employer] Interactive Patient Education ©2016 [employer]       [facility]  CHG 4% Patient Instruction Sheet    Chlorhexidine Before Surgery  Chlorhexidine gluconate (CHG)  is a germ-killing (antiseptic) solution that is used to clean the skin. It gets rid of the bacteria that normally live on the skin. Cleaning your skin with CHG before surgery helps lower the risk for infection after surgery.    How to use CHG solution  · You will take 2 showers, one shower the night before surgery, the second shower the morning of surgery before coming to the hospital.  · Use CHG only as told by your health care provider, and follow the instructions on the label.  · Use CHG solution while taking a shower. Follow these steps when using CHG solution (unless your health care provider gives you different instructions):  1. Start the shower.  2. Use your normal soap and shampoo to wash your face and hair.  3. Turn off the shower or move out of the shower stream.  4. Pour the CHG onto a clean washcloth. Do not use any type of brush or rough-edged sponge.  5. Starting at your neck, lather your body down to your toes. Make sure you:  6. Pay special attention to the part of your body where you will be having surgery. Scrub this area for at least 1 minute.  7. Use the full amount of CHG as directed. Usually, this is one half bottle for each shower.  8. Do not use CHG on your head or face. If the solution gets into your ears or eyes, rinse them well with water.  9. Avoid your genital area.  10. Avoid any areas of skin that have broken skin, cuts, or scrapes.  11. Scrub your back and under your arms. Make sure to wash skin folds.  12. Let the lather sit on your skin for 1-2 minutes or as long as told by your health care  provider.  13. Thoroughly rinse your entire body in the shower. Make sure that all body creases and crevices are rinsed well.  14. Dry off with a clean towel. Do not put any substances on your body afterward, such as powder, lotion, or perfume.  15. Put on clean clothes or pajamas.  16. If it is the night before your surgery, sleep in clean sheets.    What are the risks?  Risks of using CHG  include:  · A skin reaction.  · Hearing loss, if CHG gets in your ears.  · Eye injury, if CHG gets in your eyes and is not rinsed out.  · The CHG product catching fire.  Make sure that you avoid smoking and flames after applying CHG to your skin.  Do not use CHG:  · If you have a chlorhexidine allergy or have previously reacted to chlorhexidine.  · On babies younger than 2 months of age.      On the day of surgery, when you are taken to your room in Outpatient Surgery you will be given a CHG prepackaged cloth to wipe the site for your surgery.  How to use CHG prepackaged cloths  · Follow the instructions on the label.  · Use the CHG cloth on clean, dry skin. Follow these steps when using a CHG cloth (unless your health care provider gives you different instructions):  1. Using the CHG cloth, vigorously scrub the part of your body where you will be having surgery. Scrub using a back-and-forth motion for 3 minutes. The area on your body should be completely wet with CHG when you are finished scrubbing.  2. Do not rinse. Discard the cloth and let the area air-dry for 1 minute. Do not put any substances on your body afterward, such as powder, lotion, or perfume.  Contact a health care provider if:  · Your skin gets irritated after scrubbing.  · You have questions about using your solution or cloth.  Get help right away if:  · Your eyes become very red or swollen.  · Your eyes itch badly.  · Your skin itches badly and is red or swollen.  · Your hearing changes.  · You have trouble seeing.  · You have swelling or tingling in your mouth or throat.  · You have trouble breathing.  · You swallow any chlorhexidine.  Summary  · Chlorhexidine gluconate (CHG) is a germ-killing (antiseptic) solution that is used to clean the skin. Cleaning your skin with CHG before surgery helps lower the risk for infection after surgery.  · You may be given CHG to use at home. It may be in a bottle or in a prepackaged cloth to use on your skin.  Carefully follow your health care provider's instructions and the instructions on the product label.  · Do not use CHG if you have a chlorhexidine allergy.  · Contact your health care provider if your skin gets irritated after scrubbing.  This information is not intended to replace advice given to you by your health care provider. Make sure you discuss any questions you have with your health care provider.  Document Released: 09/11/2013 Document Revised: 11/15/2018 Document Reviewed: 11/15/2018  [employer] Interactive Patient Education © 2019 [employer]          PATIENT/FAMILY/RESPONSIBLE PARTY VERBALIZES UNDERSTANDING OF ABOVE EDUCATION.  COPY OF PAIN SCALE GIVEN AND REVIEWED WITH VERBALIZED UNDERSTANDING.

## 2020-10-09 NOTE — TELEPHONE ENCOUNTER
Spoke with patient and reminded of 600 arrival time Monday for his procedure with Dr. Vargas.  Patient expressed understanding.

## 2020-10-10 LAB
COVID LABCORP PRIORITY: NORMAL
SARS-COV-2 RNA RESP QL NAA+PROBE: NOT DETECTED

## 2020-10-12 ENCOUNTER — ANESTHESIA EVENT (OUTPATIENT)
Dept: PERIOP | Facility: HOSPITAL | Age: 65
End: 2020-10-12

## 2020-10-12 ENCOUNTER — HOSPITAL ENCOUNTER (OUTPATIENT)
Facility: HOSPITAL | Age: 65
Setting detail: HOSPITAL OUTPATIENT SURGERY
Discharge: HOME | End: 2020-10-12
Attending: SURGERY | Admitting: SURGERY

## 2020-10-12 ENCOUNTER — ANESTHESIA (OUTPATIENT)
Dept: PERIOP | Facility: HOSPITAL | Age: 65
End: 2020-10-12

## 2020-10-12 ENCOUNTER — APPOINTMENT (OUTPATIENT)
Dept: ULTRASOUND IMAGING | Facility: HOSPITAL | Age: 65
End: 2020-10-12

## 2020-10-12 VITALS
DIASTOLIC BLOOD PRESSURE: 98 MMHG | RESPIRATION RATE: 18 BRPM | HEART RATE: 71 BPM | SYSTOLIC BLOOD PRESSURE: 134 MMHG | OXYGEN SATURATION: 93 % | TEMPERATURE: 98 F

## 2020-10-12 DIAGNOSIS — I87.322 CHRONIC VENOUS HYPERTENSION WITH INFLAMMATION INVOLVING LEFT SIDE: ICD-10-CM

## 2020-10-12 PROCEDURE — 76937 US GUIDE VASCULAR ACCESS: CPT

## 2020-10-12 PROCEDURE — C1894 INTRO/SHEATH, NON-LASER: HCPCS | Performed by: SURGERY

## 2020-10-12 PROCEDURE — 25010000002 PROPOFOL 10 MG/ML EMULSION: Performed by: NURSE ANESTHETIST, CERTIFIED REGISTERED

## 2020-10-12 PROCEDURE — 25010000002 FENTANYL CITRATE (PF) 100 MCG/2ML SOLUTION: Performed by: NURSE ANESTHETIST, CERTIFIED REGISTERED

## 2020-10-12 PROCEDURE — 36482 ENDOVEN THER CHEM ADHES 1ST: CPT | Performed by: SURGERY

## 2020-10-12 DEVICE — SYS CLS VENASEAL TISS ADHS: Type: IMPLANTABLE DEVICE | Site: LEG | Status: FUNCTIONAL

## 2020-10-12 RX ORDER — FENTANYL CITRATE 50 UG/ML
INJECTION, SOLUTION INTRAMUSCULAR; INTRAVENOUS AS NEEDED
Status: DISCONTINUED | OUTPATIENT
Start: 2020-10-12 | End: 2020-10-12 | Stop reason: SURG

## 2020-10-12 RX ORDER — SODIUM CHLORIDE 0.9 % (FLUSH) 0.9 %
3-10 SYRINGE (ML) INJECTION AS NEEDED
Status: DISCONTINUED | OUTPATIENT
Start: 2020-10-12 | End: 2020-10-12 | Stop reason: HOSPADM

## 2020-10-12 RX ORDER — LABETALOL HYDROCHLORIDE 5 MG/ML
5 INJECTION, SOLUTION INTRAVENOUS
Status: CANCELLED | OUTPATIENT
Start: 2020-10-12

## 2020-10-12 RX ORDER — BUPIVACAINE HCL/0.9 % NACL/PF 0.1 %
2 PLASTIC BAG, INJECTION (ML) EPIDURAL ONCE
Status: COMPLETED | OUTPATIENT
Start: 2020-10-12 | End: 2020-10-12

## 2020-10-12 RX ORDER — SODIUM CHLORIDE 9 MG/ML
INJECTION, SOLUTION INTRAVENOUS AS NEEDED
Status: DISCONTINUED | OUTPATIENT
Start: 2020-10-12 | End: 2020-10-12 | Stop reason: HOSPADM

## 2020-10-12 RX ORDER — IBUPROFEN 600 MG/1
600 TABLET ORAL ONCE AS NEEDED
Status: CANCELLED | OUTPATIENT
Start: 2020-10-12

## 2020-10-12 RX ORDER — FLUMAZENIL 0.1 MG/ML
0.2 INJECTION INTRAVENOUS AS NEEDED
Status: CANCELLED | OUTPATIENT
Start: 2020-10-12

## 2020-10-12 RX ORDER — FENTANYL CITRATE 50 UG/ML
25 INJECTION, SOLUTION INTRAMUSCULAR; INTRAVENOUS
Status: CANCELLED | OUTPATIENT
Start: 2020-10-12

## 2020-10-12 RX ORDER — LIDOCAINE HYDROCHLORIDE 10 MG/ML
0.5 INJECTION, SOLUTION EPIDURAL; INFILTRATION; INTRACAUDAL; PERINEURAL ONCE AS NEEDED
Status: DISCONTINUED | OUTPATIENT
Start: 2020-10-12 | End: 2020-10-12 | Stop reason: HOSPADM

## 2020-10-12 RX ORDER — OXYCODONE AND ACETAMINOPHEN 7.5; 325 MG/1; MG/1
1 TABLET ORAL EVERY 4 HOURS PRN
Status: CANCELLED | OUTPATIENT
Start: 2020-10-12 | End: 2020-10-22

## 2020-10-12 RX ORDER — OXYCODONE HYDROCHLORIDE AND ACETAMINOPHEN 5; 325 MG/1; MG/1
1 TABLET ORAL ONCE AS NEEDED
Status: CANCELLED | OUTPATIENT
Start: 2020-10-12

## 2020-10-12 RX ORDER — NALOXONE HCL 0.4 MG/ML
0.4 VIAL (ML) INJECTION AS NEEDED
Status: CANCELLED | OUTPATIENT
Start: 2020-10-12

## 2020-10-12 RX ORDER — HYDROCODONE BITARTRATE AND ACETAMINOPHEN 5; 325 MG/1; MG/1
1-2 TABLET ORAL EVERY 6 HOURS PRN
Qty: 20 TABLET | Refills: 0 | Status: SHIPPED | OUTPATIENT
Start: 2020-10-12 | End: 2020-10-19

## 2020-10-12 RX ORDER — ONDANSETRON 2 MG/ML
4 INJECTION INTRAMUSCULAR; INTRAVENOUS ONCE AS NEEDED
Status: CANCELLED | OUTPATIENT
Start: 2020-10-12

## 2020-10-12 RX ORDER — SODIUM CHLORIDE, SODIUM LACTATE, POTASSIUM CHLORIDE, CALCIUM CHLORIDE 600; 310; 30; 20 MG/100ML; MG/100ML; MG/100ML; MG/100ML
1000 INJECTION, SOLUTION INTRAVENOUS CONTINUOUS
Status: DISCONTINUED | OUTPATIENT
Start: 2020-10-12 | End: 2020-10-12 | Stop reason: HOSPADM

## 2020-10-12 RX ORDER — SODIUM CHLORIDE 0.9 % (FLUSH) 0.9 %
3 SYRINGE (ML) INJECTION AS NEEDED
Status: DISCONTINUED | OUTPATIENT
Start: 2020-10-12 | End: 2020-10-12 | Stop reason: HOSPADM

## 2020-10-12 RX ORDER — ONDANSETRON 2 MG/ML
4 INJECTION INTRAMUSCULAR; INTRAVENOUS ONCE AS NEEDED
Status: DISCONTINUED | OUTPATIENT
Start: 2020-10-12 | End: 2020-10-12 | Stop reason: HOSPADM

## 2020-10-12 RX ORDER — SODIUM CHLORIDE 0.9 % (FLUSH) 0.9 %
3 SYRINGE (ML) INJECTION EVERY 12 HOURS SCHEDULED
Status: DISCONTINUED | OUTPATIENT
Start: 2020-10-12 | End: 2020-10-12 | Stop reason: HOSPADM

## 2020-10-12 RX ORDER — HYDROCODONE BITARTRATE AND ACETAMINOPHEN 5; 325 MG/1; MG/1
1 TABLET ORAL ONCE AS NEEDED
Status: DISCONTINUED | OUTPATIENT
Start: 2020-10-12 | End: 2020-10-12 | Stop reason: HOSPADM

## 2020-10-12 RX ORDER — PROPOFOL 10 MG/ML
VIAL (ML) INTRAVENOUS AS NEEDED
Status: DISCONTINUED | OUTPATIENT
Start: 2020-10-12 | End: 2020-10-12 | Stop reason: SURG

## 2020-10-12 RX ORDER — SODIUM CHLORIDE, SODIUM LACTATE, POTASSIUM CHLORIDE, CALCIUM CHLORIDE 600; 310; 30; 20 MG/100ML; MG/100ML; MG/100ML; MG/100ML
100 INJECTION, SOLUTION INTRAVENOUS CONTINUOUS
Status: DISCONTINUED | OUTPATIENT
Start: 2020-10-12 | End: 2020-10-12 | Stop reason: HOSPADM

## 2020-10-12 RX ADMIN — PROPOFOL 60 MG: 10 INJECTION, EMULSION INTRAVENOUS at 12:43

## 2020-10-12 RX ADMIN — Medication 2 G: at 12:54

## 2020-10-12 RX ADMIN — PROPOFOL 75 MCG/KG/MIN: 10 INJECTION, EMULSION INTRAVENOUS at 12:43

## 2020-10-12 RX ADMIN — LIDOCAINE HYDROCHLORIDE 100 MG: 20 INJECTION, SOLUTION INTRAVENOUS at 12:43

## 2020-10-12 RX ADMIN — SODIUM CHLORIDE, POTASSIUM CHLORIDE, SODIUM LACTATE AND CALCIUM CHLORIDE 1000 ML: 600; 310; 30; 20 INJECTION, SOLUTION INTRAVENOUS at 07:07

## 2020-10-12 RX ADMIN — FENTANYL CITRATE 100 MCG: 50 INJECTION, SOLUTION INTRAMUSCULAR; INTRAVENOUS at 12:43

## 2020-10-12 RX ADMIN — PROPOFOL 100 MG: 10 INJECTION, EMULSION INTRAVENOUS at 12:54

## 2020-10-12 NOTE — ANESTHESIA POSTPROCEDURE EVALUATION
Patient: Roc Rees    Procedure Summary     Date: 10/12/20 Room / Location: [facility] OR  / [facility] HYBRID OR 12    Anesthesia Start: 1241 Anesthesia Stop: 1328    Procedure: LEFT SAPHENOUS VEIN CLOSURE using Venaseal (Left Leg Lower) Diagnosis:       Chronic venous hypertension with inflammation involving left side      (Chronic venous hypertension with inflammation involving left side [I87.322])    Surgeon: Edgardo Vargas DO Provider: Jesse Moore CRNA    Anesthesia Type: general ASA Status: 2          Anesthesia Type: general    Vitals  Vitals Value Taken Time   /95 10/12/20 1345   Temp 98 °F (36.7 °C) 10/12/20 1327   Pulse 71 10/12/20 1352   Resp 14 10/12/20 1327   SpO2 91 % 10/12/20 1352   Vitals shown include unvalidated device data.        Post Anesthesia Care and Evaluation    Patient location during evaluation: PACU  Patient participation: complete - patient participated  Level of consciousness: awake and alert  Pain management: adequate  Airway patency: patent  Anesthetic complications: No anesthetic complications    Cardiovascular status: acceptable  Respiratory status: acceptable  Hydration status: acceptable    Comments: Blood pressure 153/95, pulse 76, temperature 98 °F (36.7 °C), temperature source Temporal, resp. rate 18, SpO2 90 %.    Pt discharged from PACU based on cira score >8

## 2020-10-12 NOTE — ANESTHESIA PREPROCEDURE EVALUATION
Anesthesia Evaluation     Patient summary reviewed   no history of anesthetic complications:  NPO Solid Status: > 8 hours  NPO Liquid Status: > 8 hours           Airway   Mallampati: II  TM distance: >3 FB  Neck ROM: full  No difficulty expected  Dental - normal exam     Pulmonary    (+) a smoker (quit 1 year) Former,   Cardiovascular   Exercise tolerance: good (4-7 METS)    ECG reviewed    (+) hypertension,       Neuro/Psych  (-) seizures, TIA, CVA  GI/Hepatic/Renal/Endo    (+) obesity,   renal disease stones,   (-) liver disease, diabetes    Musculoskeletal         ROS comment: Right below knee amputation from accident  Abdominal    Substance History      OB/GYN          Other   arthritis,                        Anesthesia Plan    ASA 2     general     intravenous induction     Anesthetic plan, all risks, benefits, and alternatives have been provided, discussed and informed consent has been obtained with: patient.

## 2020-10-12 NOTE — ANESTHESIA PROCEDURE NOTES
Airway  Urgency: elective    Airway not difficult    General Information and Staff    Patient location during procedure: OR  CRNA: Jesse Moore CRNA    Indications and Patient Condition  Indications for airway management: airway protection    Preoxygenated: yes  Mask difficulty assessment: 1 - vent by mask    Final Airway Details  Final airway type: supraglottic airway      Successful airway: classic and I-gel  Size 5    Number of attempts at approach: 1  Assessment: lips, teeth, and gum same as pre-op and atraumatic intubation

## 2020-10-16 ENCOUNTER — TELEPHONE (OUTPATIENT)
Dept: VASCULAR SURGERY | Facility: CLINIC | Age: 65
End: 2020-10-16

## 2020-10-16 NOTE — TELEPHONE ENCOUNTER
Spoke with Mr Rees reminding him of his appointments for Monday, October 19th, 2020. Reminded [facility]30 [facility] testing and follow up afterwards at 245 pm with Felicia MOJICA. Mr Rees confirmed he would be here.

## 2020-10-19 ENCOUNTER — OFFICE VISIT (OUTPATIENT)
Dept: VASCULAR SURGERY | Facility: CLINIC | Age: 65
End: 2020-10-19

## 2020-10-19 ENCOUNTER — HOSPITAL ENCOUNTER (OUTPATIENT)
Dept: ULTRASOUND IMAGING | Facility: HOSPITAL | Age: 65
Discharge: HOME | End: 2020-10-19
Admitting: SURGERY

## 2020-10-19 VITALS
BODY MASS INDEX: 36.51 KG/M2 | DIASTOLIC BLOOD PRESSURE: 82 MMHG | HEIGHT: 70 IN | OXYGEN SATURATION: 97 % | SYSTOLIC BLOOD PRESSURE: 130 MMHG | HEART RATE: 87 BPM | WEIGHT: 255 LBS

## 2020-10-19 DIAGNOSIS — I87.322 CHRONIC VENOUS HYPERTENSION WITH INFLAMMATION INVOLVING LEFT SIDE: ICD-10-CM

## 2020-10-19 DIAGNOSIS — I10 ESSENTIAL HYPERTENSION: ICD-10-CM

## 2020-10-19 DIAGNOSIS — I73.9 PAD (PERIPHERAL ARTERY DISEASE) (HCC): ICD-10-CM

## 2020-10-19 DIAGNOSIS — I65.23 BILATERAL CAROTID ARTERY STENOSIS: ICD-10-CM

## 2020-10-19 DIAGNOSIS — I87.322 CHRONIC VENOUS HYPERTENSION WITH INFLAMMATION INVOLVING LEFT SIDE: Primary | ICD-10-CM

## 2020-10-19 PROCEDURE — 93971 EXTREMITY STUDY: CPT

## 2020-10-19 PROCEDURE — 99213 OFFICE O/P EST LOW 20 MIN: CPT | Performed by: NURSE PRACTITIONER

## 2020-10-19 PROCEDURE — 93971 EXTREMITY STUDY: CPT | Performed by: SURGERY

## 2020-10-20 NOTE — PROGRESS NOTES
"10/19/2020        Naty Ryan, APRN   [address] [place] [address]    Roc Rees  1955    Chief Complaint   Patient presents with   • Follow-up     1 Week Post-Op Follow UP For LEFT SAPHENOUS VEIN CLOSURE using Venaseal. Test [id number] US pad venous lower ext left. Patient denies any stroke like symptoms.    • Smoker/Non-Smoker     Patient is Former Smoker   • Med Management     Verbally verified medications with patient. Mr Rees verbalized all medications are correct and up to date.        Dear No ref. provider found:      HPI  I had the pleasure of seeing your patient Roc Rees in the office today.  As you recall, Roc Rees is a 65 y.o.  male who you are currently following for routine health maintenance.  He does have a history of right below-knee amputation following trauma.  Previously greater saphenous vein stripped from the knee to ankle in an effort to save the right lower extremity.   He has had previous bouts of cellulitis to the left lower extremity with increased swelling.  He did undergo endovenous ablation of the left lower extremity greater saphenous vein with Venaseal.  He does state overall his leg feels pretty good.  He did have noninvasive testing performed today, which I did review in office.    Review of Systems   Constitutional: Negative.    HENT: Positive for hearing loss.    Eyes: Negative.    Respiratory: Negative.    Cardiovascular: Positive for leg swelling.   Gastrointestinal: Negative.    Endocrine: Negative.    Genitourinary: Negative.    Musculoskeletal: Negative.         Right below-knee amputation, walks with prosthetic   Skin: Positive for color change. Negative for wound.   Allergic/Immunologic: Negative.    Neurological: Negative.    Hematological: Negative.    Psychiatric/Behavioral: Negative.    All other systems reviewed and are negative.      /82 (BP Location: Right arm, Patient Position: Sitting, Cuff Size: Adult)   Pulse 87   Ht 177.8 cm (70\")   Wt " 116 kg (255 lb)   SpO2 97%   BMI 36.59 kg/m²   Physical Exam  Vitals signs and nursing note reviewed.   Constitutional:       General: He is not in acute distress.     Appearance: Normal appearance. He is well-developed. He is obese. He is not diaphoretic.   HENT:      Head: Normocephalic and atraumatic.   Eyes:      Pupils: Pupils are equal, round, and reactive to light.   Neck:      Vascular: No carotid bruit or JVD.   Cardiovascular:      Rate and Rhythm: Normal rate and regular rhythm.      Pulses: Normal pulses.           Femoral pulses are 2+ on the right side and 2+ on the left side.       Popliteal pulses are 2+ on the right side and 2+ on the left side.        Dorsalis pedis pulses are 2+ on the left side.        Posterior tibial pulses are 2+ on the left side.      Heart sounds: Normal heart sounds, S1 normal and S2 normal. No murmur. No friction rub. No gallop.       Comments: Varicose veins to his left lower extremity.  Pulmonary:      Effort: Pulmonary effort is normal.      Breath sounds: Normal breath sounds.   Abdominal:      General: Bowel sounds are normal. There is no abdominal bruit.      Palpations: Abdomen is soft.      Tenderness: There is no abdominal tenderness.   Musculoskeletal: Normal range of motion.         General: Deformity (right BKA) present.   Feet:      Right foot:      Amputation: Right leg is amputated below knee.   Skin:     General: Skin is warm and dry.   Neurological:      General: No focal deficit present.      Mental Status: He is alert and oriented to person, place, and time.      Cranial Nerves: No cranial nerve deficit.   Psychiatric:         Mood and Affect: Mood normal.         Behavior: Behavior normal.         Thought Content: Thought content normal.         Judgment: Judgment normal.         Us Guided Vascular Access    Result Date: 10/12/2020  Narrative: History: Swelling  Comments: Grayscale imaging as well as color flow duplex were used to evaluate the left  lower extremity greater saphenous vein during venous closure.  The greater saphenous vein was successfully cannulated just below the knee. The catheter was placed up to the junction and pulled back 5 cm and marked.      Impression: Successful endovenous closure of the left lower extremity greater saphenous vein. This report was finalized on 10/12/2020 16:25 by Dr. Edgardo Vargas MD.    Us Venous Doppler Lower Extremity Left (duplex)    Result Date: 10/19/2020  Narrative: History: Swelling      Impression: Impression: 1. There is no evidence of deep venous thrombosis of the left lower extremity. 2. The greater saphenous vein is closed from zones 2 through 6.  Comments: Left lower extremity venous duplex exam was performed using color Doppler flow, Doppler wave form analysis, and grayscale imaging, with and without compression. There is no evidence of deep venous thrombosis of the common femoral, superficial femoral, popliteal, posterior tibial, and peroneal veins. There is no thrombus identified in the saphenofemoral junction. There is no evidence of clot in the right common femoral vein.  This report was finalized on 10/19/2020 17:15 by Dr. Edgardo Vargas MD.       Patient Active Problem List   Diagnosis   • BPH (benign prostatic hypertrophy) with urinary obstruction   • Impotence due to erectile dysfunction   • Hypogonadism in male   • Erectile dysfunction   • Benign prostatic hyperplasia with lower urinary tract symptoms   • Rotator cuff arthropathy of right shoulder   • Primary osteoarthritis of right shoulder   • Microscopic hematuria   • Renal stone   • Hydronephrosis with renal and ureteral calculus obstruction   • Ureteral stone   • Renal cyst   • Ureteral stone with hydronephrosis   • Hypertension   • Chronic venous hypertension with inflammation involving left side         ICD-10-CM ICD-9-CM   1. Chronic venous hypertension with inflammation involving left side  I87.322 459.32   2. PAD (peripheral  artery disease) (CMS/[facility])  I73.9 443.9   3. Bilateral carotid artery stenosis  I65.23 433.10     433.30   4. Essential hypertension  I10 401.9         Plan: After thoroughly evaluating Roc Rees, I believe the best course of action is to remain conservative from vascular surgery standpoint.  I did review his testing which shows a greater saphenous vein closed as expected post endovenous closure with no evidence of DVT.  We will see him back in 6 months with noninvasive testing including ABIs as well as a carotid duplex.  I would like him to continue wearing compression stocking on his left leg as previously instructed.  I did review his testing which does show significant venous insufficiency to his left lower extremity from the groin to midcalf.  The patient can continue taking their current medication regimen as previously planned.  This was all discussed in full with complete understanding.    Thank you for allowing me to participate in the care of your patient.  Please do not hesitate with any questions or concerns.  I will keep you aware of any further encounters with Roc Rees.        Sincerely yours,         GALINA Jenkins

## 2020-10-21 ENCOUNTER — TELEPHONE (OUTPATIENT)
Dept: VASCULAR SURGERY | Facility: CLINIC | Age: 65
End: 2020-10-21

## 2020-10-21 NOTE — TELEPHONE ENCOUNTER
Spoke with Mr Rees letting him know that they do still do the Vascular Screening Test he just needed to call [facility] our Scheduling Dept at [phone] opt #3 and they would schedule his wife he just needed to ask for the Vascular Screening Bundle it was like $75       ----- Message from GALINA Jenkins sent at 10/19/2020  8:46 PM CDT -----  He had some questions about vascular screening testing for his wife.  Please let him know that they do continue to do the vascular screening tests.  Vascular lab said they just need to call scheduling if you can give them the number and tell them they want a vascular screening bundle scheduled.  She thinks the cost was $75 at that time.  Thanks.  It is almost 9:00 or I would call myself LOL

## 2020-12-30 DIAGNOSIS — E29.1 HYPOGONADISM, MALE: ICD-10-CM

## 2020-12-30 DIAGNOSIS — N40.1 BENIGN PROSTATIC HYPERPLASIA WITH LOWER URINARY TRACT SYMPTOMS, SYMPTOM DETAILS UNSPECIFIED: ICD-10-CM

## 2020-12-31 LAB
ERYTHROCYTE [DISTWIDTH] IN BLOOD BY AUTOMATED COUNT: 18 % (ref 12.3–15.4)
HCT VFR BLD AUTO: 53.8 % (ref 37.5–51)
HGB BLD-MCNC: 16.4 G/DL (ref 13–17.7)
MCH RBC QN AUTO: 23.6 PG (ref 26.6–33)
MCHC RBC AUTO-ENTMCNC: 30.5 G/DL (ref 31.5–35.7)
MCV RBC AUTO: 77.5 FL (ref 79–97)
PLATELET # BLD AUTO: 308 10*3/MM3 (ref 140–450)
PSA SERPL-MCNC: 1.27 NG/ML (ref 0–4)
RBC # BLD AUTO: 6.94 10*6/MM3 (ref 4.14–5.8)
TESTOST SERPL-MCNC: 581 NG/DL (ref 264–916)
WBC # BLD AUTO: 5.78 10*3/MM3 (ref 3.4–10.8)

## 2021-01-04 NOTE — PROGRESS NOTES
Subjective    Mr. Rees is 65 y.o. male    Chief Complaint: Recurrent Nephrolithiasis.     History of Present Illness  Recurrent Nephrolithiasis  Patient is her for recurrent nephrolithiasis.  Location of stone is left distal ureter. Stones were found for workup of hematuria.  Pt is currently Asymptomatic.  Pt. Has had stone disease for severalyear(s). Risk factors for stone disease include none. Previous management of stone disease was Ureteroscopy.  Stone analysis was Calcium Oxalate Monohydrate.  Metabolic workup revealed none.  Current treatment regimen includes none.  Associated symptoms include none.      Lab Results   Component Value Date    PSA 1.270 12/30/2020    PSA 0.936 06/30/2020    PSA 0.691 04/09/2019         The following portions of the patient's history were reviewed and updated as appropriate: allergies, current medications, past family history, past medical history, past social history, past surgical history and problem list.    Review of Systems   Constitutional: Negative for chills and fever.   Gastrointestinal: Negative for abdominal pain, anal bleeding and blood in stool.   Genitourinary: Positive for frequency and urgency. Negative for dysuria and hematuria.         Current Outpatient Medications:   •  albuterol sulfate  (90 Base) MCG/ACT inhaler, TAKE 2 PUFFS BY MOUTH EVERY 4 HOURS AS NEEDED FOR 10 DAYS, Disp: , Rfl:   •  aspirin 81 MG EC tablet, Take 81 mg by mouth Daily., Disp: , Rfl:   •  losartan-hydrochlorothiazide (HYZAAR) 100-12.5 MG per tablet, Take 1 tablet by mouth Daily., Disp: , Rfl:   •  tamsulosin (FLOMAX) 0.4 MG capsule 24 hr capsule, Take 2 capsules by mouth Daily., Disp: 180 capsule, Rfl: 3  •  Testosterone (Axiron) 30 MG/ACT solution, Apply to underarms daily, Disp: 90 mL, Rfl: 5  •  triamcinolone (KENALOG) 0.1 % ointment, APPLY TWICE A DAY TO EARS AND NECK FOR 2 WEEKS AT A TIME., Disp: , Rfl:   •  vardenafil (LEVITRA) 20 MG tablet, Take 1 tablet by mouth As Needed  "for Erectile Dysfunction., Disp: 10 tablet, Rfl: 11    Past Medical History:   Diagnosis Date   • History of transfusion 1986   • Hypertension    • Nephrolithiasis    • Shoulder pain    • SOB (shortness of breath)        Past Surgical History:   Procedure Laterality Date   • BELOW KNEE LEG AMPUTATION Right    • LYMPH NODE BIOPSY     • TOTAL SHOULDER ARTHROPLASTY W/ DISTAL CLAVICLE EXCISION Right 1/22/2019    Procedure: RIGHT  REVERSE TOTAL SHOULDER  ARTHROPLASTY;  Surgeon: Donnie Frias MD;  Location:  [facility] OR;  Service: Orthopedics   • URETEROSCOPY LASER LITHOTRIPSY WITH STENT INSERTION Left 4/25/2019    Procedure: CYSTOSCOPY LEFT URETERAL BALLOON DILATION AND URETEROSCOPY LASER LITHOTRIPSY WITH LEFT STENT INSERTION;  Surgeon: Tyrel Hilario MD;  Location:  [facility] OR;  Service: Urology   • VEIN SURGERY Left 10/12/2020    Procedure: LEFT SAPHENOUS VEIN CLOSURE using Venaseal;  Surgeon: Edgardo Vargas DO;  Location:  [facility] HYBRID OR 12;  Service: Vascular;  Laterality: Left;       Social History     Socioeconomic History   • Marital status:      Spouse name: Not on file   • Number of children: Not on file   • Years of education: Not on file   • Highest education level: Not on file   Tobacco Use   • Smoking status: Former Smoker   • Smokeless tobacco: Never Used   • Tobacco comment: occassionally smokes trying to quit as of 4/16/2019   Substance and Sexual Activity   • Alcohol use: No   • Drug use: No   • Sexual activity: Defer       Family History   Problem Relation Age of Onset   • Kidney cancer Father    • No Known Problems Mother        Objective    Temp 96.7 °F (35.9 °C) (Temporal)   Ht 177.8 cm (70\")   Wt 118 kg (261 lb 3.2 oz)   BMI 37.48 kg/m²     Physical Exam      KUB independent review    A KUB is available for me to review today.  The image is inspected for a bowel gas pattern and the general bone structure of the spine and pelvis. The kidneys are then inspected closely.  Renal " outline is noted if identifiable. The kidney, collecting system, and anticipated path of the ureter are examined for calcifications including those in the true pelvis.  This film reveals:    On the right there is a single renal stone measuring 1.4 mm.    On the left there are no calcificaitons seen in the kidney or the expected course of the ureter. .    Results for orders placed or performed in visit on 01/06/21   POC Urinalysis Dipstick, Multipro    Specimen: Urine   Result Value Ref Range    Color Yellow Yellow, Straw, Dark Yellow, Linda    Clarity, UA Clear Clear    Glucose, UA Negative Negative, 1000 mg/dL (3+) mg/dL    Bilirubin Negative Negative    Ketones, UA Negative Negative    Specific Gravity  1.025 1.005 - 1.030    Blood, UA Trace (A) Negative    pH, Urine 5.5 5.0 - 8.0    Protein, POC 1+ (A) Negative mg/dL    Urobilinogen, UA Normal Normal    Nitrite, UA Negative Negative    Leukocytes Negative Negative     Assessment and Plan    Diagnoses and all orders for this visit:    1. Nephrolithiasis (Primary)  -     POC Urinalysis Dipstick, Multipro    2. Benign prostatic hyperplasia with lower urinary tract symptoms, symptom details unspecified  -     tamsulosin (FLOMAX) 0.4 MG capsule 24 hr capsule; Take 2 capsules by mouth Daily.  Dispense: 180 capsule; Refill: 3    3. Hypogonadism, male  -     Testosterone (Axiron) 30 MG/ACT solution; Apply to underarms daily  Dispense: 90 mL; Refill: 5    4. Impotence of organic origin  -     vardenafil (LEVITRA) 20 MG tablet; Take 1 tablet by mouth As Needed for Erectile Dysfunction.  Dispense: 10 tablet; Refill: 11       Former patient of Dr. Hilario's with BPH, low testosterone, nephrolithiasis, and erectile dysfunction.     AUA symptom score is 15/35.  He does have frequency as well as incomplete emptying intermittency urgency straining and nocturia x1.  Continue Flomax 0.8.  I am going to schedule cystoscopy because he is interested in Urolift.      Reviewed  testosterone and CBC.  T 581. CBC with high Hct.  I have asked him to donate blood.   I will continue his Axiron.       I personally viewed his KUB.  He does have 1.4 cm right lower pole renal stone.  He is opted for observation of this.     7/20 we attempted to start him on injection therapy for ED. He has decided against that. I will restart Levitra on him.

## 2021-01-06 ENCOUNTER — OFFICE VISIT (OUTPATIENT)
Dept: UROLOGY | Facility: CLINIC | Age: 66
End: 2021-01-06

## 2021-01-06 ENCOUNTER — HOSPITAL ENCOUNTER (OUTPATIENT)
Dept: GENERAL RADIOLOGY | Facility: HOSPITAL | Age: 66
Discharge: HOME | End: 2021-01-06
Admitting: UROLOGY

## 2021-01-06 VITALS — BODY MASS INDEX: 37.39 KG/M2 | HEIGHT: 70 IN | TEMPERATURE: 96.7 F | WEIGHT: 261.2 LBS

## 2021-01-06 DIAGNOSIS — N20.0 NEPHROLITHIASIS: ICD-10-CM

## 2021-01-06 DIAGNOSIS — N20.0 NEPHROLITHIASIS: Primary | ICD-10-CM

## 2021-01-06 DIAGNOSIS — N52.9 IMPOTENCE OF ORGANIC ORIGIN: ICD-10-CM

## 2021-01-06 DIAGNOSIS — N40.1 BENIGN PROSTATIC HYPERPLASIA WITH LOWER URINARY TRACT SYMPTOMS, SYMPTOM DETAILS UNSPECIFIED: ICD-10-CM

## 2021-01-06 DIAGNOSIS — E29.1 HYPOGONADISM, MALE: ICD-10-CM

## 2021-01-06 PROCEDURE — 74018 RADEX ABDOMEN 1 VIEW: CPT

## 2021-01-06 PROCEDURE — 81003 URINALYSIS AUTO W/O SCOPE: CPT | Performed by: UROLOGY

## 2021-01-06 PROCEDURE — 99214 OFFICE O/P EST MOD 30 MIN: CPT | Performed by: UROLOGY

## 2021-01-06 RX ORDER — VARDENAFIL HYDROCHLORIDE 20 MG/1
20 TABLET ORAL AS NEEDED
Qty: 10 TABLET | Refills: 11 | Status: SHIPPED | OUTPATIENT
Start: 2021-01-06 | End: 2022-02-09

## 2021-01-06 RX ORDER — TAMSULOSIN HYDROCHLORIDE 0.4 MG/1
2 CAPSULE ORAL DAILY
Qty: 180 CAPSULE | Refills: 3 | Status: SHIPPED | OUTPATIENT
Start: 2021-01-06 | End: 2022-04-18

## 2021-01-06 RX ORDER — VARDENAFIL HYDROCHLORIDE 20 MG/1
20 TABLET ORAL AS NEEDED
COMMUNITY
Start: 2020-12-04 | End: 2021-01-06 | Stop reason: SDUPTHER

## 2021-01-06 RX ORDER — TESTOSTERONE 30 MG/1.5ML
SOLUTION TOPICAL
Qty: 90 ML | Refills: 5 | Status: SHIPPED | OUTPATIENT
Start: 2021-01-06 | End: 2021-08-04 | Stop reason: SDUPTHER

## 2021-02-03 ENCOUNTER — PROCEDURE VISIT (OUTPATIENT)
Dept: UROLOGY | Facility: CLINIC | Age: 66
End: 2021-02-03

## 2021-02-03 DIAGNOSIS — N40.1 BENIGN PROSTATIC HYPERPLASIA WITH LOWER URINARY TRACT SYMPTOMS, SYMPTOM DETAILS UNSPECIFIED: Primary | ICD-10-CM

## 2021-02-03 DIAGNOSIS — N52.9 IMPOTENCE OF ORGANIC ORIGIN: ICD-10-CM

## 2021-02-03 DIAGNOSIS — N20.0 NEPHROLITHIASIS: ICD-10-CM

## 2021-02-03 DIAGNOSIS — E29.1 HYPOGONADISM, MALE: ICD-10-CM

## 2021-02-03 LAB
BILIRUB BLD-MCNC: NEGATIVE MG/DL
CLARITY, POC: CLEAR
COLOR UR: YELLOW
GLUCOSE UR STRIP-MCNC: NEGATIVE MG/DL
KETONES UR QL: NEGATIVE
LEUKOCYTE EST, POC: NEGATIVE
NITRITE UR-MCNC: NEGATIVE MG/ML
PH UR: 6.5 [PH] (ref 5–8)
PROT UR STRIP-MCNC: NEGATIVE MG/DL
RBC # UR STRIP: ABNORMAL /UL
SP GR UR: 1.02 (ref 1–1.03)
UROBILINOGEN UR QL: NORMAL

## 2021-02-03 PROCEDURE — 52000 CYSTOURETHROSCOPY: CPT | Performed by: UROLOGY

## 2021-02-03 PROCEDURE — 81003 URINALYSIS AUTO W/O SCOPE: CPT | Performed by: UROLOGY

## 2021-02-03 NOTE — PROGRESS NOTES
Pre- operative diagnosis:  Benign prostatic hypertrophy    Post operative diagnosis:  Same    Procedure:  The patient was prepped and draped in a normal sterile fashion.  The urethra was anesthetized with 2% lidocaine jelly.  A flexible cystoscope was introduced per urethra.      Urethra:  Normal    Bladder:  There is no evidence of a stone, foreign body or mass within the bladder.  The bladder is minimally trabeculated.  The bladder neck is without contracture.    Ureteral orifices:  Normal position bilaterally and Clear efflux bilaterally    Prostate:  Obstructing at apex, not elongated prostatic urethra    Patient tolerated the procedure well    Complications: none    Blood loss: minimal    Follow up:    Routine follow up

## 2021-04-19 ENCOUNTER — TRANSCRIBE ORDERS (OUTPATIENT)
Dept: GENERAL RADIOLOGY | Facility: HOSPITAL | Age: 66
End: 2021-04-19

## 2021-04-19 ENCOUNTER — HOSPITAL ENCOUNTER (OUTPATIENT)
Dept: GENERAL RADIOLOGY | Facility: HOSPITAL | Age: 66
Discharge: HOME | End: 2021-04-19
Admitting: NURSE PRACTITIONER

## 2021-04-19 DIAGNOSIS — R05.9 COUGH: Primary | ICD-10-CM

## 2021-04-19 PROCEDURE — 71046 X-RAY EXAM CHEST 2 VIEWS: CPT

## 2021-04-20 ENCOUNTER — TELEPHONE (OUTPATIENT)
Dept: VASCULAR SURGERY | Facility: CLINIC | Age: 66
End: 2021-04-20

## 2021-04-20 NOTE — TELEPHONE ENCOUNTER
Spoke with Mr Rees reminding him of his appointments for Wednesday, April 21st, 2021. [facility] for 8 o'clock testing and follow up afterwards at 10 am with Felicia MOJICA. Mr Rees confirmed he would be here.

## 2021-04-21 ENCOUNTER — OFFICE VISIT (OUTPATIENT)
Dept: VASCULAR SURGERY | Facility: CLINIC | Age: 66
End: 2021-04-21

## 2021-04-21 ENCOUNTER — HOSPITAL ENCOUNTER (OUTPATIENT)
Dept: ULTRASOUND IMAGING | Facility: HOSPITAL | Age: 66
Discharge: HOME | End: 2021-04-21

## 2021-04-21 VITALS
WEIGHT: 258 LBS | SYSTOLIC BLOOD PRESSURE: 138 MMHG | HEART RATE: 70 BPM | OXYGEN SATURATION: 94 % | BODY MASS INDEX: 36.94 KG/M2 | DIASTOLIC BLOOD PRESSURE: 86 MMHG | HEIGHT: 70 IN

## 2021-04-21 DIAGNOSIS — I87.322 CHRONIC VENOUS HYPERTENSION WITH INFLAMMATION INVOLVING LEFT SIDE: ICD-10-CM

## 2021-04-21 DIAGNOSIS — I73.9 PAD (PERIPHERAL ARTERY DISEASE) (HCC): ICD-10-CM

## 2021-04-21 DIAGNOSIS — I10 ESSENTIAL HYPERTENSION: ICD-10-CM

## 2021-04-21 DIAGNOSIS — I65.23 BILATERAL CAROTID ARTERY STENOSIS: ICD-10-CM

## 2021-04-21 DIAGNOSIS — I73.9 PAD (PERIPHERAL ARTERY DISEASE) (HCC): Primary | ICD-10-CM

## 2021-04-21 PROCEDURE — 99214 OFFICE O/P EST MOD 30 MIN: CPT | Performed by: NURSE PRACTITIONER

## 2021-04-21 PROCEDURE — 93880 EXTRACRANIAL BILAT STUDY: CPT | Performed by: SURGERY

## 2021-04-21 PROCEDURE — 93923 UPR/LXTR ART STDY 3+ LVLS: CPT | Performed by: SURGERY

## 2021-04-21 PROCEDURE — 93923 UPR/LXTR ART STDY 3+ LVLS: CPT

## 2021-04-21 PROCEDURE — 93880 EXTRACRANIAL BILAT STUDY: CPT

## 2021-04-21 NOTE — PROGRESS NOTES
4/21/2021        Provider, No Known   [facility] [place] [address]    Roc Rees  1955    Chief Complaint   Patient presents with   • Follow-up     6 Month Follow Up For Bilateral Carotid Artery Stenosis and Peripheral Artery Disease. Test [id number] US pad carotid bilateral and US pad ankle / brach ind ext comp. Patient denies any stroke like symptoms.    • Former Smoker     Patient is a FOrmer Smoker    • Med Management     Verbally verified medications with patient        Dear No ref. provider found:      HPI  I had the pleasure of seeing your patient Roc Rees in the office today.  As you recall, Roc Rees is a 65 y.o.  male who we are following for venous insufficiency and carotid occlusive disease.  He does have a history of right below-knee amputation following trauma.   Previously greater saphenous vein stripped from the knee to ankle in an effort to save the right lower extremity.   He has had previous bouts of cellulitis to the left lower extremity with increased swelling.  He did undergo endovenous ablation of the left lower extremity greater saphenous vein with Venaseal on 10/12/2020.  He is overall doing well with no further bouts of cellulitis.  He is maintained on aspirin.  He did have noninvasive testing performed today, which I did review in office.        Review of Systems   Constitutional: Negative.    HENT: Positive for hearing loss.    Eyes: Negative.    Respiratory: Negative.    Cardiovascular: Negative for leg swelling.   Gastrointestinal: Negative.    Endocrine: Negative.    Genitourinary: Negative.    Musculoskeletal: Negative.         Right below-knee amputation, walks with prosthetic   Skin: Positive for color change and rash. Negative for wound.   Allergic/Immunologic: Negative.    Neurological: Negative.    Hematological: Negative.    Psychiatric/Behavioral: Negative.    All other systems reviewed and are negative.      /86 (BP Location: Right arm, Patient  "Position: Sitting, Cuff Size: Adult)   Pulse 70   Ht 177.8 cm (70\")   Wt 117 kg (258 lb)   SpO2 94%   BMI 37.02 kg/m²   Physical Exam  Vitals and nursing note reviewed.   Constitutional:       General: He is not in acute distress.     Appearance: Normal appearance. He is well-developed. He is obese. He is not diaphoretic.   HENT:      Head: Normocephalic and atraumatic.   Eyes:      Pupils: Pupils are equal, round, and reactive to light.   Neck:      Vascular: No carotid bruit or JVD.   Cardiovascular:      Rate and Rhythm: Normal rate and regular rhythm.      Pulses: Normal pulses.           Femoral pulses are 2+ on the right side and 2+ on the left side.       Popliteal pulses are 2+ on the right side and 2+ on the left side.        Dorsalis pedis pulses are 2+ on the left side.        Posterior tibial pulses are 2+ on the left side.      Heart sounds: Normal heart sounds, S1 normal and S2 normal. No murmur heard.   No friction rub. No gallop.       Comments: Varicose veins to his left lower extremity.  Pulmonary:      Effort: Pulmonary effort is normal.      Breath sounds: Normal breath sounds.   Abdominal:      General: Bowel sounds are normal. There is no abdominal bruit.      Palpations: Abdomen is soft.      Tenderness: There is no abdominal tenderness.   Musculoskeletal:         General: Deformity (right BKA) present. Normal range of motion.      Right Lower Extremity: Right leg is amputated below knee.   Skin:     General: Skin is warm and dry.      Findings: Rash (Eczema to left ankle) present.   Neurological:      General: No focal deficit present.      Mental Status: He is alert and oriented to person, place, and time.      Cranial Nerves: No cranial nerve deficit.   Psychiatric:         Mood and Affect: Mood normal.         Behavior: Behavior normal.         Thought Content: Thought content normal.         Judgment: Judgment normal.     Diagnostic data:  Noninvasive test including left ISABELA shows " 1.07.  Carotid duplex shows less than 50% carotid stenosis bilaterally with bilateral antegrade vertebral flow.    Patient Active Problem List   Diagnosis   • BPH (benign prostatic hypertrophy) with urinary obstruction   • Impotence due to erectile dysfunction   • Hypogonadism in male   • Erectile dysfunction   • Benign prostatic hyperplasia with lower urinary tract symptoms   • Rotator cuff arthropathy of right shoulder   • Primary osteoarthritis of right shoulder   • Microscopic hematuria   • Renal stone   • Hydronephrosis with renal and ureteral calculus obstruction   • Ureteral stone   • Renal cyst   • Ureteral stone with hydronephrosis   • Hypertension   • Chronic venous hypertension with inflammation involving left side         ICD-10-CM ICD-9-CM   1. PAD (peripheral artery disease) (CMS/[facility])  I73.9 443.9   2. Bilateral carotid artery stenosis  I65.23 433.10     433.30   3. Chronic venous hypertension with inflammation involving left side  I87.322 459.32   4. Essential hypertension  I10 401.9         Plan: After thoroughly evaluating Roc Rees, I believe the best course of action is remain conservative from vascular surgery standpoint.  I did review his testing which shows normal left ISABELA.  Carotid duplex shows less than 50% carotid stenosis bilaterally.  Currently, he is doing well denies any strokelike symptoms or claudication.  He does have some eczema to his left outer ankle.  He is applying triamcinolone to this.  We will see him back in 1 year with repeat noninvasive testing for continued surveillance, including ABIs and a carotid duplex.   I would like him to continue wearing compression stocking on his left leg as previously instructed.   I did discuss vascular risk factors as they pertain to the progression of vascular disease including controlling his hypertension.  His blood pressure slightly elevated at 138/86.  Patient's Body mass index is 37.02 kg/m². BMI is above normal parameters.  Recommendations include: educational material. The patient can continue taking their current medication regimen as previously planned.  This was all discussed in full with complete understanding.    Thank you for allowing me to participate in the care of your patient.  Please do not hesitate with any questions or concerns.  I will keep you aware of any further encounters with Roc Rees.        Sincerely yours,         GALINA Jenkins

## 2021-05-16 NOTE — PROGRESS NOTES
"Background:  Pt w dyspnea, cough.  bph obesity pvd s/p amputation, former smoker, . severe obst w hyperinfl. atelecasis   Chief Complaint  Shortness of Breath and stage 3 severe copd    Subjective    History of Present Illness       Roc Rees presents to [facility].  He has had shortness of breath for over a year, better since stopping smoking.  He was given samples of breztri.  Worked in coal mine, around a lot of coal dust.  He quit smoking several months ago.  He reports a prior work-up for a lung lesion which did not amount to anything, likely histoplasma; he thinks he was evaluated here but there is nothing in our record going back to when we started with the clinical works around 2009..  He has had intermittent sputum production with using inhaler and not smoking.     has a past medical history of History of transfusion (1986), Hypertension, Nephrolithiasis, Shoulder pain, and SOB (shortness of breath).   has a past surgical history that includes Below knee leg amputation (Right); Lymph node biopsy; Total shoulder arthroplasty w/ distal clavicle excision (Right, 1/22/2019); ureteroscopy laser lithotripsy with stent insertion (Left, 4/25/2019); and Vein Surgery (Left, 10/12/2020).  family history includes Kidney cancer in his father; No Known Problems in his mother.   reports that he has quit smoking. He has never used smokeless tobacco. He reports that he does not drink alcohol and does not use drugs.  No Known Allergies    Objective     Vital Signs:   /92   Pulse 80   Ht 177.8 cm (70\")   Wt 118 kg (259 lb 6.4 oz)   SpO2 98% Comment: ra  BMI 37.22 kg/m²   Physical Exam  Constitutional:       Appearance: Normal appearance. He is not ill-appearing.   Pulmonary:      Effort: Pulmonary effort is normal.      Breath sounds: No wheezing, rhonchi or rales.   Chest:      Chest wall: No tenderness.   Musculoskeletal:         General: Deformity " (rle bka) present.   Neurological:      Mental Status: He is alert.        Result Review  Data Reviewed:{ Labs  Result Review  Imaging  Med Tab  Media :23}   Full Pulmonary Function Test With Bronchodilator (09/11/2019 14:03)  Date : 9/12/2019  Interpretation :  1.  Spirometry is consistent with a severe obstructive ventilatory defect.  2.  Lung volumes reveal hyperinflation.  There is also a mild decrease in vital capacity second to obstruction and a mild decrease in inspiratory capacity.  3.  Diffusion capacity is within normal limits particularly when corrected for alveolar volume.  Marv Graves MD   Date : 9/12/2019                Assessment and Plan {CC Problem List  Visit Diagnosis  ROS  Review (Popup)  Health Maintenance  Quality  BestPractice  Medications  SmartSets  SnapShot Encounters  Media :23}   Problem List Items Addressed This Visit     None      Visit Diagnoses     Stage 3 severe COPD by GOLD classification (CMS/[facility])    -  Primary    Relevant Medications    Budeson-Glycopyrrol-Formoterol (BREZTRI) 160-9-4.8 MCG/ACT aerosol inhaler    albuterol sulfate  (90 Base) MCG/ACT inhaler    Shortness of breath        Personal history of nicotine dependence        Relevant Orders     CT Chest Low Dose Cancer Screening WO    Cough        Atelectasis        Relevant Medications    Budeson-Glycopyrrol-Formoterol (BREZTRI) 160-9-4.8 MCG/ACT aerosol inhaler    albuterol sulfate  (90 Base) MCG/ACT inhaler      Patient presents with respiratory symptoms compatible with stage III COPD based on spirometry from 2019.  He has been started on triple therapy and has responded to that.  For now we will continue that.  Over the longer term we could continue triple therapy or long-acting muscarinic with long-acting beta agonist combination and discontinue the steroid.  Shortness of breath is certainly due to COPD.  He has stopped smoking for the most part which is great.  He does report  "that he has \"snuck\" a cigarette occasionally.  We discussed that that small amount of the smoke can still set off the inflammatory process aggravating the COPD and he does valve to stop altogether.  Atelectasis is likely related to mucous plugging in the setting of COPD, and certainly cough can be explained by COPD.  We will go ahead and start the breast 3.  I have sent a prescription in for that.  With history of smoking he does have candidacy for screening CT scan of the chest.  He has had some nodules in the past.  We discussed the benefit of screening CT early detection of lung cancer but also with the anticipation that some abnormal findings may lead us to further work-up and could very well turn out to be benign.  Follow Up {Instructions Charge Capture  Follow-up Communications :23}   Return in about 3 months (around 8/17/2021).  Patient was given instructions and counseling regarding his condition or for health maintenance advice. Please see specific information pulled into the AVS if appropriate.    Electronically signed by Roc Morrison MD, 5/17/2021, 15:45 CDT    "

## 2021-05-17 ENCOUNTER — OFFICE VISIT (OUTPATIENT)
Dept: PULMONOLOGY | Facility: CLINIC | Age: 66
End: 2021-05-17

## 2021-05-17 VITALS
HEART RATE: 80 BPM | OXYGEN SATURATION: 98 % | BODY MASS INDEX: 37.14 KG/M2 | WEIGHT: 259.4 LBS | SYSTOLIC BLOOD PRESSURE: 148 MMHG | HEIGHT: 70 IN | DIASTOLIC BLOOD PRESSURE: 92 MMHG

## 2021-05-17 DIAGNOSIS — J44.9 STAGE 3 SEVERE COPD BY GOLD CLASSIFICATION (HCC): Primary | ICD-10-CM

## 2021-05-17 DIAGNOSIS — R05.9 COUGH: ICD-10-CM

## 2021-05-17 DIAGNOSIS — R06.02 SHORTNESS OF BREATH: ICD-10-CM

## 2021-05-17 DIAGNOSIS — Z87.891 PERSONAL HISTORY OF NICOTINE DEPENDENCE: ICD-10-CM

## 2021-05-17 DIAGNOSIS — J98.11 ATELECTASIS: ICD-10-CM

## 2021-05-17 PROCEDURE — 99204 OFFICE O/P NEW MOD 45 MIN: CPT | Performed by: INTERNAL MEDICINE

## 2021-05-17 RX ORDER — ALBUTEROL SULFATE 90 UG/1
2 AEROSOL, METERED RESPIRATORY (INHALATION) EVERY 4 HOURS PRN
Qty: 18 G | Refills: 11 | Status: SHIPPED | OUTPATIENT
Start: 2021-05-17

## 2021-06-02 ENCOUNTER — DOCUMENTATION (OUTPATIENT)
Dept: CT IMAGING | Facility: HOSPITAL | Age: 66
End: 2021-06-02

## 2021-06-02 ENCOUNTER — HOSPITAL ENCOUNTER (OUTPATIENT)
Dept: CT IMAGING | Facility: HOSPITAL | Age: 66
Discharge: HOME | End: 2021-06-02
Admitting: INTERNAL MEDICINE

## 2021-06-02 DIAGNOSIS — Z87.891 PERSONAL HISTORY OF NICOTINE DEPENDENCE: ICD-10-CM

## 2021-06-02 PROCEDURE — 71271 CT THORAX LUNG CANCER SCR C-: CPT

## 2021-06-02 NOTE — PROGRESS NOTES
Let pt know this looked ok.  Nothing suspicious.  Nothing else to do for now.  Keep follow up as planned

## 2021-07-27 DIAGNOSIS — E29.1 HYPOGONADISM, MALE: ICD-10-CM

## 2021-07-29 LAB
ERYTHROCYTE [DISTWIDTH] IN BLOOD BY AUTOMATED COUNT: 17.6 % (ref 11.6–15.4)
HCT VFR BLD AUTO: 52.2 % (ref 37.5–51)
HGB BLD-MCNC: 16.6 G/DL (ref 13–17.7)
MCH RBC QN AUTO: 24.3 PG (ref 26.6–33)
MCHC RBC AUTO-ENTMCNC: 31.8 G/DL (ref 31.5–35.7)
MCV RBC AUTO: 77 FL (ref 79–97)
PLATELET # BLD AUTO: 368 X10E3/UL (ref 150–450)
RBC # BLD AUTO: 6.82 X10E6/UL (ref 4.14–5.8)
TESTOST SERPL-MCNC: 537 NG/DL (ref 264–916)
WBC # BLD AUTO: 5.1 X10E3/UL (ref 3.4–10.8)

## 2021-08-04 ENCOUNTER — HOSPITAL ENCOUNTER (OUTPATIENT)
Dept: GENERAL RADIOLOGY | Facility: HOSPITAL | Age: 66
Discharge: HOME | End: 2021-08-04
Admitting: UROLOGY

## 2021-08-04 ENCOUNTER — OFFICE VISIT (OUTPATIENT)
Dept: UROLOGY | Facility: CLINIC | Age: 66
End: 2021-08-04

## 2021-08-04 VITALS — WEIGHT: 256.8 LBS | BODY MASS INDEX: 36.76 KG/M2 | HEIGHT: 70 IN | TEMPERATURE: 98.3 F

## 2021-08-04 DIAGNOSIS — E29.1 HYPOGONADISM, MALE: ICD-10-CM

## 2021-08-04 DIAGNOSIS — N20.0 NEPHROLITHIASIS: ICD-10-CM

## 2021-08-04 DIAGNOSIS — N52.9 IMPOTENCE OF ORGANIC ORIGIN: ICD-10-CM

## 2021-08-04 DIAGNOSIS — N40.1 BENIGN PROSTATIC HYPERPLASIA WITH LOWER URINARY TRACT SYMPTOMS, SYMPTOM DETAILS UNSPECIFIED: Primary | ICD-10-CM

## 2021-08-04 LAB
BILIRUB BLD-MCNC: NEGATIVE MG/DL
CLARITY, POC: CLEAR
COLOR UR: YELLOW
GLUCOSE UR STRIP-MCNC: ABNORMAL MG/DL
KETONES UR QL: NEGATIVE
LEUKOCYTE EST, POC: NEGATIVE
NITRITE UR-MCNC: NEGATIVE MG/ML
PH UR: 6.5 [PH] (ref 5–8)
PROT UR STRIP-MCNC: NEGATIVE MG/DL
RBC # UR STRIP: ABNORMAL /UL
SP GR UR: 1.01 (ref 1–1.03)
UROBILINOGEN UR QL: NORMAL

## 2021-08-04 PROCEDURE — 81003 URINALYSIS AUTO W/O SCOPE: CPT | Performed by: UROLOGY

## 2021-08-04 PROCEDURE — 74018 RADEX ABDOMEN 1 VIEW: CPT

## 2021-08-04 PROCEDURE — 99214 OFFICE O/P EST MOD 30 MIN: CPT | Performed by: UROLOGY

## 2021-08-04 RX ORDER — TESTOSTERONE 30 MG/1.5ML
SOLUTION TOPICAL
Qty: 90 ML | Refills: 5 | Status: SHIPPED | OUTPATIENT
Start: 2021-08-04 | End: 2022-05-09 | Stop reason: SDUPTHER

## 2021-08-05 ENCOUNTER — TELEPHONE (OUTPATIENT)
Dept: UROLOGY | Facility: CLINIC | Age: 66
End: 2021-08-05

## 2021-08-05 NOTE — TELEPHONE ENCOUNTER
Dr Sosa patient    Called to schedule trimex injection education because his kit is due to arrive 08/06/2021.  Roverto's first available appt is not until 08/24. He asked to be put on the wait list, so I verified his phone number and put him on the wait list.    He also asked if there was any way Dr Sosa could do the appointment, I told him I would ask, but Roverto is the only one here who normally does appointments.

## 2021-08-10 PROBLEM — J98.4 SCARRING OF LUNG: Chronic | Status: ACTIVE | Noted: 2021-08-10

## 2021-08-10 PROBLEM — E66.01 CLASS 2 SEVERE OBESITY DUE TO EXCESS CALORIES WITH SERIOUS COMORBIDITY AND BODY MASS INDEX (BMI) OF 37.0 TO 37.9 IN ADULT (HCC): Chronic | Status: ACTIVE | Noted: 2021-08-10

## 2021-08-10 PROBLEM — E66.812 CLASS 2 SEVERE OBESITY DUE TO EXCESS CALORIES WITH SERIOUS COMORBIDITY AND BODY MASS INDEX (BMI) OF 37.0 TO 37.9 IN ADULT: Status: ACTIVE | Noted: 2021-08-10

## 2021-08-10 PROBLEM — Z57.2 OCCUPATIONAL EXPOSURE TO COAL DUST: Chronic | Status: ACTIVE | Noted: 2021-08-10

## 2021-08-10 PROBLEM — R91.1 LUNG NODULE: Chronic | Status: ACTIVE | Noted: 2021-08-10

## 2021-08-10 PROBLEM — J44.9 COPD, GROUP C, BY GOLD 2017 CLASSIFICATION: Chronic | Status: ACTIVE | Noted: 2021-08-10

## 2021-08-10 PROBLEM — E66.01 CLASS 2 SEVERE OBESITY DUE TO EXCESS CALORIES WITH SERIOUS COMORBIDITY AND BODY MASS INDEX (BMI) OF 37.0 TO 37.9 IN ADULT (HCC): Status: ACTIVE | Noted: 2021-08-10

## 2021-08-10 PROBLEM — E66.812 CLASS 2 SEVERE OBESITY DUE TO EXCESS CALORIES WITH SERIOUS COMORBIDITY AND BODY MASS INDEX (BMI) OF 37.0 TO 37.9 IN ADULT: Chronic | Status: ACTIVE | Noted: 2021-08-10

## 2021-08-10 PROBLEM — Z87.891 PERSONAL HISTORY OF NICOTINE DEPENDENCE: Chronic | Status: ACTIVE | Noted: 2021-08-10

## 2021-08-10 PROBLEM — R91.1 LUNG NODULE: Status: ACTIVE | Noted: 2021-08-10

## 2021-08-10 PROBLEM — Z57.2 OCCUPATIONAL EXPOSURE TO COAL DUST: Status: ACTIVE | Noted: 2021-08-10

## 2021-08-10 PROBLEM — Z87.891 PERSONAL HISTORY OF NICOTINE DEPENDENCE: Status: ACTIVE | Noted: 2021-08-10

## 2021-08-10 PROBLEM — J44.9 COPD, GROUP C, BY GOLD 2017 CLASSIFICATION: Status: ACTIVE | Noted: 2021-08-10

## 2021-08-10 PROBLEM — J98.4 SCARRING OF LUNG: Status: ACTIVE | Noted: 2021-08-10

## 2021-08-10 NOTE — PROGRESS NOTES
Chief Complaint  Stage 3 severe COPD by GOLD classification    Subjective    History of Present Illness {CC  Problem List  Visit Diagnosis   Encounters  Notes  Medications  Labs  Result Review Imaging  Media     Roc Rees presents to [facility] for:    Management of COPD, lung scarring and lung nodule.  Most recent FEV1 was 48.  He is a former smoker.  Low-dose lung cancer imaging in June 2021.  He has had a right upper lobe 4 mm nodule present since 2007.  Occupational history positive for coal mining.  He was a  for approximately 10 years.  He was involved in an accident while mining and lost his right leg below the knee.  He is obese.  He is typically followed by Dr. Morrison.  He has been given Breztri which was very helpful.  He is continue to take it.  He has however developed issues with constipation and difficulty urinating.  He is wondering if this could be a side effect.       Prior to Admission medications    Medication Sig Start Date End Date Taking? Authorizing Provider   albuterol sulfate  (90 Base) MCG/ACT inhaler Inhale 2 puffs Every 4 (Four) Hours As Needed for Wheezing or Shortness of Air. 5/17/21   Roc Morrison MD   aspirin 81 MG EC tablet Take 81 mg by mouth Daily.    ProviderGwendolyn MD   Budeson-Glycopyrrol-Formoterol (BREZTRI) 160-9-4.8 MCG/ACT aerosol inhaler Inhale 2 puffs 2 (Two) Times a Day. 5/17/21   Roc Morrison MD   losartan-hydrochlorothiazide (HYZAAR) 100-12.5 MG per tablet Take 1 tablet by mouth Daily.    Gwendolyn La MD   tamsulosin (FLOMAX) 0.4 MG capsule 24 hr capsule Take 2 capsules by mouth Daily. 1/6/21   Howie Sosa MD   Testosterone (Axiron) 30 MG/ACT solution Apply to underarms daily 8/4/21   Howie Sosa MD   triamcinolone (KENALOG) 0.1 % ointment APPLY TWICE A DAY TO EARS AND NECK FOR 2 WEEKS AT A TIME. 10/30/20   Gwendolyn La MD  "  vardenafil (LEVITRA) 20 MG tablet Take 1 tablet by mouth As Needed for Erectile Dysfunction. 1/6/21   Howie Sosa MD       Social History     Socioeconomic History   • Marital status:      Spouse name: Not on file   • Number of children: Not on file   • Years of education: Not on file   • Highest education level: Not on file   Tobacco Use   • Smoking status: Former Smoker   • Smokeless tobacco: Never Used   • Tobacco comment: occassionally smokes trying to quit as of 4/16/2019   Vaping Use   • Vaping Use: Never used   Substance and Sexual Activity   • Alcohol use: No   • Drug use: No   • Sexual activity: Defer       Objective   Vital Signs:   /90   Pulse 78   Ht 177.8 cm (70\")   Wt 112 kg (248 lb)   SpO2 96%   BMI 35.58 kg/m²     Physical Exam  Constitutional:       Appearance: He is morbidly obese.      Interventions: Face mask in place.   HENT:      Head:      Comments: Hearing aid  Cardiovascular:      Rate and Rhythm: Normal rate and regular rhythm.      Heart sounds: No murmur heard.     Pulmonary:      Effort: Pulmonary effort is normal.      Breath sounds: Normal breath sounds.   Musculoskeletal:      Right lower leg: No edema.      Left lower leg: No edema.      Comments: Right lower extremity prosthesis   Neurological:      Mental Status: He is alert and oriented to person, place, and time.        Result Review :{ Labs  Result Review  Imaging  Med Tab  Media :      My interpretation of the PFT: none for this visit    Results for orders placed during the hospital encounter of 09/11/19    Full Pulmonary Function Test With Bronchodilator    Narrative  [facility] - Pulmonary Function Test    [address] [place] [address]  [id number]  [phone]    Patient : Roc Rees  MRN : [id number]  CSN : [id number]  Pulmonologist : Marv Graves MD  Date : 9/12/2019    ______________________________________________________________________    Interpretation :  1.  Spirometry " is consistent with a severe obstructive ventilatory defect.  2.  Lung volumes reveal hyperinflation.  There is also a mild decrease in vital capacity second to obstruction and a mild decrease in inspiratory capacity.  3.  Diffusion capacity is within normal limits particularly when corrected for alveolar volume.      Marv Graves MD    My interpretation of imaging:  None for this visit    My interpretation of labs: none for this visit      Assessment and Plan {CC Problem List  Visit Diagnosis  ROS  Review (Popup)  Health Maintenance  Quality  BestPractice  Medications  SmartSets  SnapShot Encounters  Media      Diagnoses and all orders for this visit:    1. Scarring of lung (Primary)  Comments:  Alpha-1 testing offered.  Will call results once they become available  Orders:  -     Alpha - 1 - Antitrypsin; Future    2. COPD, group C, by GOLD 2017 classification (CMS/[facility])  Comments:  Urinary retention and constipation with Breztri.  Trial of Breo 100 to assess for benefit but lack of side effect.  If it is helpful he will call for prescripti  Orders:  -     Alpha - 1 - Antitrypsin; Future  -     Fluticasone Furoate-Vilanterol (Breo Ellipta) 100-25 MCG/INH inhaler; Inhale 1 puff Daily for 14 days.  Dispense: 1 each; Refill: 0    3. Personal history of nicotine dependence  Comments:  Recommend continued avoidance of smoking.  Low-dose lung cancer imaging due in June 2022    4. Occupational exposure to coal dust  Comments:  Ongoing imaging and PFTs for surveillance    5. Class 2 severe obesity due to excess calories with serious comorbidity and body mass index (BMI) of 37.0 to 37.9 in adult (CMS/[facility])  Comments:  Education material provided and after visit summary about obesity and elevated BMI    6. Lung nodule      Discontinuation of anticholinergic due to side effect of urinary retention.  Hopefully we can control his COPD with ICS/LABA.  Trial samples provided.  If it is not beneficial he will  call back for a trial of Symbicort, air duo, Advair, Dulera.    Patient's Body mass index is 35.58 kg/m². indicating that he is obese (BMI >30). Obesity-related health conditions include the following: Hypertension.  Education material provided about obesity and elevated BMI and is after visit summary    Bernadette Sparks, GALINA  8/16/2021  12:07 CDT    Follow Up {Instructions Charge Capture  Follow-up Communications   Return in about 6 months (around 2/16/2022).    Patient was given instructions and counseling regarding his condition or for health maintenance advice. Please see specific information pulled into the AVS if appropriate.

## 2021-08-16 ENCOUNTER — OFFICE VISIT (OUTPATIENT)
Dept: PULMONOLOGY | Facility: CLINIC | Age: 66
End: 2021-08-16

## 2021-08-16 VITALS
WEIGHT: 248 LBS | HEART RATE: 78 BPM | BODY MASS INDEX: 35.5 KG/M2 | HEIGHT: 70 IN | SYSTOLIC BLOOD PRESSURE: 150 MMHG | DIASTOLIC BLOOD PRESSURE: 90 MMHG | OXYGEN SATURATION: 96 %

## 2021-08-16 DIAGNOSIS — Z57.2 OCCUPATIONAL EXPOSURE TO COAL DUST: Chronic | ICD-10-CM

## 2021-08-16 DIAGNOSIS — J98.4 SCARRING OF LUNG: Primary | Chronic | ICD-10-CM

## 2021-08-16 DIAGNOSIS — J44.9 COPD, GROUP C, BY GOLD 2017 CLASSIFICATION (HCC): Chronic | ICD-10-CM

## 2021-08-16 DIAGNOSIS — E66.01 CLASS 2 SEVERE OBESITY DUE TO EXCESS CALORIES WITH SERIOUS COMORBIDITY AND BODY MASS INDEX (BMI) OF 37.0 TO 37.9 IN ADULT (HCC): Chronic | ICD-10-CM

## 2021-08-16 DIAGNOSIS — Z87.891 PERSONAL HISTORY OF NICOTINE DEPENDENCE: ICD-10-CM

## 2021-08-16 DIAGNOSIS — R91.1 LUNG NODULE: ICD-10-CM

## 2021-08-16 PROCEDURE — 99214 OFFICE O/P EST MOD 30 MIN: CPT | Performed by: NURSE PRACTITIONER

## 2021-08-16 RX ORDER — LOSARTAN POTASSIUM 100 MG/1
100 TABLET ORAL DAILY
COMMUNITY
Start: 2021-07-23

## 2021-08-16 RX ORDER — HYDROCHLOROTHIAZIDE 12.5 MG/1
12.5 CAPSULE, GELATIN COATED ORAL EVERY MORNING
COMMUNITY
Start: 2021-06-26

## 2021-08-16 SDOH — HEALTH STABILITY - PHYSICAL HEALTH: OCCUPATIONAL EXPOSURE TO DUST: Z57.2

## 2021-08-16 NOTE — PATIENT INSTRUCTIONS
"BMI for Adults  What is BMI?  Body mass index (BMI) is a number that is calculated from a person's weight and height. BMI can help estimate how much of a person's weight is composed of fat. BMI does not measure body fat directly. Rather, it is an alternative to procedures that directly measure body fat, which can be difficult and expensive.  BMI can help identify people who may be at higher risk for certain medical problems.  What are BMI measurements used for?  BMI is used as a screening tool to identify possible weight problems. It helps determine whether a person is obese, overweight, a healthy weight, or underweight.  BMI is useful for:  · Identifying a weight problem that may be related to a medical condition or may increase the risk for medical problems.  · Promoting changes, such as changes in diet and exercise, to help reach a healthy weight. BMI screening can be repeated to see if these changes are working.  How is BMI calculated?  BMI involves measuring your weight in relation to your height. Both height and weight are measured, and the BMI is calculated from those numbers. This can be done either in English (U[place] or metric measurements. Note that charts and online BMI calculators are available to help you find your BMI quickly and easily without having to do these calculations yourself.  To calculate your BMI in English ([place] measurements:    1. Measure your weight in pounds (lb).  2. Multiply the number of pounds by 703.  ? For example, for a person who weighs 180 lb, multiply that number by 703, which equals 126,540.  3. Measure your height in inches. Then multiply that number by itself to get a measurement called \"inches squared.\"  ? For example, for a person who is 70 inches tall, the \"inches squared\" measurement is 70 inches x 70 inches, which equals 4,900 inches squared.  4. Divide the total from step 2 (number of lb x 703) by the total from step 3 (inches squared): 126,540 ÷ 4,900 = 25.8. This is " "your BMI.  To calculate your BMI in metric measurements:  1. Measure your weight in kilograms (kg).  2. Measure your height in meters (m). Then multiply that number by itself to get a measurement called \"meters squared.\"  ? For example, for a person who is 1.75 m tall, the \"meters squared\" measurement is 1.75 m x 1.75 m, which is equal to 3.1 meters squared.  3. Divide the number of kilograms (your weight) by the meters squared number. In this example: 70 ÷ 3.1 = 22.6. This is your BMI.  What do the results mean?  BMI charts are used to identify whether you are underweight, normal weight, overweight, or obese. The following guidelines will be used:  · Underweight: BMI less than 18.5.  · Normal weight: BMI between 18.5 and 24.9.  · Overweight: BMI between 25 and 29.9.  · Obese: BMI of 30 or above.  Keep these notes in mind:  · Weight includes both fat and muscle, so someone with a muscular build, such as an athlete, may have a BMI that is higher than 24.9. In cases like these, BMI is not an accurate measure of body fat.  · To determine if excess body fat is the cause of a BMI of 25 or higher, further assessments may need to be done by a health care provider.  · BMI is usually interpreted in the same way for men and women.  Where to find more information  For more information about BMI, including tools to quickly calculate your BMI, go to these websites:  · Centers for Disease Control and Prevention: www.cdc.gov  · American Heart Association: www.heart.org  · National Heart, Lung, and Blood [place]: www.nhlbi.nih.gov  Summary  · Body mass index (BMI) is a number that is calculated from a person's weight and height.  · BMI may help estimate how much of a person's weight is composed of fat. BMI can help identify those who may be at higher risk for certain medical problems.  · BMI can be measured using English measurements or metric measurements.  · BMI charts are used to identify whether you are underweight, normal " weight, overweight, or obese.  This information is not intended to replace advice given to you by your health care provider. Make sure you discuss any questions you have with your health care provider.  Document Revised: 09/09/2020 Document Reviewed: 07/17/2020  Elsevier Patient Education © 2021 [employer] Inc[employer]

## 2021-08-18 NOTE — PROGRESS NOTES
Subjective    Mr. Rees is 66 y.o. male    Chief Complaint: Erectile dysfunction    History of Present Illness  Patient is a 66-year-old gentleman with history of hypogonadism and kidney stones when he was seen by Dr. Sosa last 08/04/2021 he was given prescription for Trimix.  His wife is a nurse and he has already injected himself so he does not need injection instruction today.  He did want to discuss some questions he had regarding injection technique and needles.  He has been using insulin needles which are little shorter than the needles that we recommend for some reason he opted not to get needles sent from [facility].  Last time he injected he did not have good response however previous to that he did.  Is using just 0.08 which is less than 0.1 mL which is a very small dose and was wanting to know if he could increase.    The following portions of the patient's history were reviewed and updated as appropriate: allergies, current medications, past family history, past medical history, past social history, past surgical history and problem list.    Review of Systems   Constitutional: Negative for chills and fever.   Gastrointestinal: Negative for abdominal pain, anal bleeding and blood in stool.   Genitourinary: Negative for dysuria, frequency, hematuria and urgency.         Current Outpatient Medications:   •  albuterol sulfate  (90 Base) MCG/ACT inhaler, Inhale 2 puffs Every 4 (Four) Hours As Needed for Wheezing or Shortness of Air., Disp: 18 g, Rfl: 11  •  aspirin 81 MG EC tablet, Take 81 mg by mouth Daily., Disp: , Rfl:   •  Fluticasone Furoate-Vilanterol (Breo Ellipta) 100-25 MCG/INH inhaler, Inhale 1 puff Daily for 14 days., Disp: 1 each, Rfl: 0  •  hydroCHLOROthiazide (MICROZIDE) 12.5 MG capsule, Take 12.5 mg by mouth Every Morning., Disp: , Rfl:   •  losartan (COZAAR) 100 MG tablet, Take 100 mg by mouth Daily., Disp: , Rfl:   •  losartan-hydrochlorothiazide (HYZAAR) 100-12.5 MG per  tablet, Take 1 tablet by mouth Daily., Disp: , Rfl:   •  tamsulosin (FLOMAX) 0.4 MG capsule 24 hr capsule, Take 2 capsules by mouth Daily., Disp: 180 capsule, Rfl: 3  •  Testosterone (Axiron) 30 MG/ACT solution, Apply to underarms daily, Disp: 90 mL, Rfl: 5  •  triamcinolone (KENALOG) 0.1 % ointment, APPLY TWICE A DAY TO EARS AND NECK FOR 2 WEEKS AT A TIME., Disp: , Rfl:   •  vardenafil (LEVITRA) 20 MG tablet, Take 1 tablet by mouth As Needed for Erectile Dysfunction., Disp: 10 tablet, Rfl: 11    Past Medical History:   Diagnosis Date   • Class 2 severe obesity due to excess calories with serious comorbidity and body mass index (BMI) of 37.0 to 37.9 in adult (CMS/[facility]) 8/10/2021   • COPD, group C, by GOLD 2017 classification (CMS/[facility]) 8/10/2021   • History of transfusion 1986   • Hypertension    • Lung nodule 8/10/2021    Right upper lobe, 2007, 4 mm   • Nephrolithiasis    • Occupational exposure to coal dust 8/10/2021   • Personal history of nicotine dependence 8/10/2021   • Scarring of lung 8/10/2021   • Shoulder pain    • SOB (shortness of breath)        Past Surgical History:   Procedure Laterality Date   • BELOW KNEE LEG AMPUTATION Right    • LYMPH NODE BIOPSY     • TOTAL SHOULDER ARTHROPLASTY W/ DISTAL CLAVICLE EXCISION Right 1/22/2019    Procedure: RIGHT  REVERSE TOTAL SHOULDER  ARTHROPLASTY;  Surgeon: Donnie Frias MD;  Location:  [facility] OR;  Service: Orthopedics   • URETEROSCOPY LASER LITHOTRIPSY WITH STENT INSERTION Left 4/25/2019    Procedure: CYSTOSCOPY LEFT URETERAL BALLOON DILATION AND URETEROSCOPY LASER LITHOTRIPSY WITH LEFT STENT INSERTION;  Surgeon: Tyrel Hilario MD;  Location:  [facility] OR;  Service: Urology   • VEIN SURGERY Left 10/12/2020    Procedure: LEFT SAPHENOUS VEIN CLOSURE using Venaseal;  Surgeon: Edgardo Vargas DO;  Location:  [facility] HYBRID OR 12;  Service: Vascular;  Laterality: Left;       Social History     Socioeconomic History   • Marital status:      Spouse  "name: Not on file   • Number of children: Not on file   • Years of education: Not on file   • Highest education level: Not on file   Tobacco Use   • Smoking status: Former Smoker   • Smokeless tobacco: Never Used   • Tobacco comment: occassionally smokes trying to quit as of 4/16/2019   Vaping Use   • Vaping Use: Never used   Substance and Sexual Activity   • Alcohol use: No   • Drug use: No   • Sexual activity: Defer       Family History   Problem Relation Age of Onset   • Kidney cancer Father    • No Known Problems Mother        Objective    Temp 98.4 °F (36.9 °C) (Temporal)   Ht 177.8 cm (70\")   Wt 113 kg (250 lb)   BMI 35.87 kg/m²     Physical Exam  Vitals reviewed.   Constitutional:       Appearance: Normal appearance.   HENT:      Head: Normocephalic and atraumatic.      Right Ear: External ear normal.      Left Ear: External ear normal.   Pulmonary:      Effort: Pulmonary effort is normal.   Neurological:      General: No focal deficit present.      Mental Status: He is alert and oriented to person, place, and time.   Psychiatric:         Mood and Affect: Mood normal.         Behavior: Behavior normal.             Results for orders placed or performed in visit on 08/24/21   POC Urinalysis Dipstick, Multipro    Specimen: Urine   Result Value Ref Range    Color Yellow Yellow, Straw, Dark Yellow, Linda    Clarity, UA Clear Clear    Glucose, UA Negative Negative, 1000 mg/dL (3+) mg/dL    Bilirubin Negative Negative    Ketones, UA Negative Negative    Specific Gravity  1.010 1.005 - 1.030    Blood, UA Trace (A) Negative    pH, Urine 5.0 5.0 - 8.0    Protein, POC Negative Negative mg/dL    Urobilinogen, UA Normal Normal    Nitrite, UA Negative Negative    Leukocytes Trace (A) Negative     Assessment and Plan    Diagnoses and all orders for this visit:    1. Erectile dysfunction, unspecified erectile dysfunction type (Primary)  -     POC Urinalysis Dipstick, Multipro    Patient with erectile dysfunction he was " sent Trymex patient did not want to have instruction injection today as he has been getting injections at home with his wife and is comfortable with the technique.  He is using insulin needles which are shorter than what is recommended because he did not get needles when he had the Trimix shipped to him.  I gave him syringes with the correct needle and we also discussed technique I was using the penis model and he is comfortable with the technique and verified what I showed him as what he is doing.  I did want him to increase to 0.15 or 0.2 he has been using 0.08 less than 0.1 which is a exceedingly low dose and he has not had the response he would like but he is getting firmness in a semierection.    He will follow-up as scheduled or can contact us as needed regarding the penile injections.

## 2021-08-24 ENCOUNTER — OFFICE VISIT (OUTPATIENT)
Dept: UROLOGY | Facility: CLINIC | Age: 66
End: 2021-08-24

## 2021-08-24 VITALS — TEMPERATURE: 98.4 F | HEIGHT: 70 IN | WEIGHT: 250 LBS | BODY MASS INDEX: 35.79 KG/M2

## 2021-08-24 DIAGNOSIS — N52.9 ERECTILE DYSFUNCTION, UNSPECIFIED ERECTILE DYSFUNCTION TYPE: Primary | ICD-10-CM

## 2021-08-24 LAB
BILIRUB BLD-MCNC: NEGATIVE MG/DL
CLARITY, POC: CLEAR
COLOR UR: YELLOW
GLUCOSE UR STRIP-MCNC: NEGATIVE MG/DL
KETONES UR QL: NEGATIVE
LEUKOCYTE EST, POC: ABNORMAL
NITRITE UR-MCNC: NEGATIVE MG/ML
PH UR: 5 [PH] (ref 5–8)
PROT UR STRIP-MCNC: NEGATIVE MG/DL
RBC # UR STRIP: ABNORMAL /UL
SP GR UR: 1.01 (ref 1–1.03)
UROBILINOGEN UR QL: NORMAL

## 2021-08-24 PROCEDURE — 81001 URINALYSIS AUTO W/SCOPE: CPT | Performed by: PHYSICIAN ASSISTANT

## 2021-08-24 PROCEDURE — 99213 OFFICE O/P EST LOW 20 MIN: CPT | Performed by: PHYSICIAN ASSISTANT

## 2021-08-30 DIAGNOSIS — J44.9 COPD, GROUP C, BY GOLD 2017 CLASSIFICATION (HCC): Chronic | ICD-10-CM

## 2021-08-30 DIAGNOSIS — J98.4 SCARRING OF LUNG: Chronic | ICD-10-CM

## 2021-09-29 ENCOUNTER — TRANSCRIBE ORDERS (OUTPATIENT)
Dept: ADMINISTRATIVE | Facility: HOSPITAL | Age: 66
End: 2021-09-29

## 2021-09-29 DIAGNOSIS — Z87.891 FORMER SMOKER: Primary | ICD-10-CM

## 2021-10-06 ENCOUNTER — HOSPITAL ENCOUNTER (OUTPATIENT)
Dept: ULTRASOUND IMAGING | Facility: HOSPITAL | Age: 66
Discharge: HOME | End: 2021-10-06
Admitting: NURSE PRACTITIONER

## 2021-10-06 DIAGNOSIS — Z87.891 FORMER SMOKER: ICD-10-CM

## 2021-10-06 PROCEDURE — 76706 US ABDL AORTA SCREEN AAA: CPT

## 2021-11-29 ENCOUNTER — TRANSCRIBE ORDERS (OUTPATIENT)
Dept: ADMINISTRATIVE | Facility: HOSPITAL | Age: 66
End: 2021-11-29

## 2021-11-29 ENCOUNTER — TELEPHONE (OUTPATIENT)
Dept: GASTROENTEROLOGY | Age: 66
End: 2021-11-29

## 2021-11-29 DIAGNOSIS — Z87.891 PERSONAL HISTORY OF TOBACCO USE, PRESENTING HAZARDS TO HEALTH: Primary | ICD-10-CM

## 2021-11-30 ENCOUNTER — TELEPHONE (OUTPATIENT)
Dept: GASTROENTEROLOGY | Age: 66
End: 2021-11-30

## 2021-12-02 ENCOUNTER — OFFICE VISIT (OUTPATIENT)
Dept: GASTROENTEROLOGY | Age: 66
End: 2021-12-02
Payer: MEDICARE

## 2021-12-02 ENCOUNTER — HOSPITAL ENCOUNTER (OUTPATIENT)
Dept: CT IMAGING | Facility: HOSPITAL | Age: 66
End: 2021-12-02

## 2021-12-02 VITALS
WEIGHT: 256.4 LBS | SYSTOLIC BLOOD PRESSURE: 152 MMHG | DIASTOLIC BLOOD PRESSURE: 86 MMHG | BODY MASS INDEX: 36.71 KG/M2 | HEIGHT: 70 IN | HEART RATE: 82 BPM | OXYGEN SATURATION: 97 %

## 2021-12-02 DIAGNOSIS — R79.0 LOW FERRITIN: Primary | ICD-10-CM

## 2021-12-02 DIAGNOSIS — D36.9 TUBULAR ADENOMA: ICD-10-CM

## 2021-12-02 PROCEDURE — 99214 OFFICE O/P EST MOD 30 MIN: CPT | Performed by: NURSE PRACTITIONER

## 2021-12-02 RX ORDER — ALBUTEROL SULFATE 90 UG/1
AEROSOL, METERED RESPIRATORY (INHALATION) PRN
COMMUNITY
Start: 2021-11-23

## 2021-12-02 RX ORDER — HYDROCHLOROTHIAZIDE 12.5 MG/1
12.5 CAPSULE, GELATIN COATED ORAL EVERY MORNING
COMMUNITY
Start: 2021-06-26

## 2021-12-02 RX ORDER — ASPIRIN 81 MG/1
81 TABLET ORAL DAILY
COMMUNITY

## 2021-12-02 RX ORDER — BUDESONIDE, GLYCOPYRROLATE, AND FORMOTEROL FUMARATE 160; 9; 4.8 UG/1; UG/1; UG/1
AEROSOL, METERED RESPIRATORY (INHALATION) DAILY
COMMUNITY
Start: 2021-11-30

## 2021-12-02 ASSESSMENT — ENCOUNTER SYMPTOMS
ABDOMINAL DISTENTION: 0
SHORTNESS OF BREATH: 0
CHOKING: 0
ANAL BLEEDING: 0
BLOOD IN STOOL: 0
NAUSEA: 0
TROUBLE SWALLOWING: 0
CONSTIPATION: 0
COUGH: 0
ABDOMINAL PAIN: 0
VOMITING: 0
DIARRHEA: 0
RECTAL PAIN: 0

## 2021-12-02 NOTE — PROGRESS NOTES
Subjective:     Patient ID: Eric Infante is a 73 y.o. male  PCP: Tremaine Gongora DO  Referring Provider: No ref. provider found    HPI  Patient presents to the office today with the following complaints: Follow-up      Pt seen today for low iron levels. Labs reviewed in media. Hgb 15.9, Hct 53.1, Ferritin 9.02, RBC 7.04. Patient denies any lower GI symptoms such as constipation, diarrhea, change in bowel habits, rectal bleeding, and rectal pain. He admits to giving blood every ~ 56 days. Pt denies any upper GI symptoms such as abdominal pain, nausea, vomiting, dysphagia, reflux and melena. Last Colonoscopy 2019 - TAP x 2, diverticular dx  Denies any family hx colon cancer or colon polyps    Assessment:     1. Low ferritin    2. Tubular adenoma            Plan:   - Discussed EGD and Colonoscopy to evaluate for signs of GI blood loss. Pt defers for now. - Plan to recheck labs with iron level in 6 weeks. - Frequent blood donation in differential for decrease ferritin. - Pt instructed to decrease times he donates blood to see if this affects iron levels  - Pt to call with any signs of GI blood loss. - Plan for EGD and Colonoscopy should levels continue to decrease. Orders  Orders Placed This Encounter   Procedures    CBC Auto Differential     Standing Status:   Future     Standing Expiration Date:   12/2/2022    Ferritin     Standing Status:   Future     Standing Expiration Date:   12/2/2022    Iron and TIBC     Order Specific Question:   Is Patient Fasting? Answer:   no     Order Specific Question:   No of Hours? Answer:   na     Medications  No orders of the defined types were placed in this encounter.         Patient History:     Past Medical History:   Diagnosis Date    History of blood transfusion     Hypertension        Past Surgical History:   Procedure Laterality Date    COLONOSCOPY      COLONOSCOPY N/A 6/28/2019    Dr Graciela Calvert-Diverticular disease-Tubular AP (-) dysplasia x 2  JOINT REPLACEMENT Right     shoulder    LEG AMPUTATION BELOW KNEE Right 1986    SKIN GRAFT Right     leg       Family History   Problem Relation Age of Onset    Kidney Cancer Father     Colon Cancer Neg Hx     Esophageal Cancer Neg Hx     Liver Cancer Neg Hx     Rectal Cancer Neg Hx     Stomach Cancer Neg Hx        Social History     Socioeconomic History    Marital status:      Spouse name: None    Number of children: None    Years of education: None    Highest education level: None   Occupational History    None   Tobacco Use    Smoking status: Former Smoker     Types: Cigarettes     Quit date: 2021     Years since quittin.6    Smokeless tobacco: Never Used   Vaping Use    Vaping Use: Never used   Substance and Sexual Activity    Alcohol use: Never    Drug use: Never    Sexual activity: None   Other Topics Concern    None   Social History Narrative    None     Social Determinants of Health     Financial Resource Strain:     Difficulty of Paying Living Expenses: Not on file   Food Insecurity:     Worried About Running Out of Food in the Last Year: Not on file    Igor of Food in the Last Year: Not on file   Transportation Needs:     Lack of Transportation (Medical): Not on file    Lack of Transportation (Non-Medical):  Not on file   Physical Activity:     Days of Exercise per Week: Not on file    Minutes of Exercise per Session: Not on file   Stress:     Feeling of Stress : Not on file   Social Connections:     Frequency of Communication with Friends and Family: Not on file    Frequency of Social Gatherings with Friends and Family: Not on file    Attends [employer] Services: Not on file    Active Member of Clubs or Organizations: Not on file    Attends Club or Organization Meetings: Not on file    Marital Status: Not on file   Intimate Partner Violence:     Fear of Current or Ex-Partner: Not on file    Emotionally Abused: Not on file    Physically Abused: Not on file    Sexually Abused: Not on file   Housing Stability:     Unable to Pay for Housing in the Last Year: Not on file    Number of Places Lived in the Last Year: Not on file    Unstable Housing in the Last Year: Not on file       Current Outpatient Medications   Medication Sig Dispense Refill    albuterol sulfate  (90 Base) MCG/ACT inhaler as needed      BREZTRI AEROSPHERE 160-9-4.8 MCG/ACT AERO daily      aspirin 81 MG EC tablet Take 81 mg by mouth daily      hydroCHLOROthiazide (MICROZIDE) 12.5 MG capsule Take 12.5 mg by mouth every morning      LOSARTAN POTASSIUM-HCTZ PO Take by mouth daily       No current facility-administered medications for this visit. No Known Allergies    Review of Systems   Constitutional: Negative for activity change, appetite change, fatigue, fever and unexpected weight change. HENT: Negative for trouble swallowing. Respiratory: Negative for cough, choking and shortness of breath. Cardiovascular: Negative for chest pain. Gastrointestinal: Negative for abdominal distention, abdominal pain, anal bleeding, blood in stool, constipation, diarrhea, nausea, rectal pain and vomiting. Allergic/Immunologic: Negative for food allergies. All other systems reviewed and are negative. Objective:     BP (!) 152/86 (Site: Right Upper Arm)   Pulse 82   Ht 5' 10\" (1.778 m)   Wt 256 lb 6.4 oz (116.3 kg)   SpO2 97%   BMI 36.79 kg/m²     Physical Exam  Vitals reviewed. Constitutional:       General: He is not in acute distress. Appearance: He is well-developed. HENT:      Head: Normocephalic and atraumatic. Right Ear: External ear normal.      Left Ear: External ear normal.      Nose: Nose normal.      Comments: Mask on     Mouth/Throat:      Comments: Mask on  Eyes:      General: No scleral icterus. Right eye: No discharge. Left eye: No discharge.       Conjunctiva/sclera: Conjunctivae normal.      Pupils: Pupils are equal, round, and reactive to light. Cardiovascular:      Rate and Rhythm: Normal rate and regular rhythm. Heart sounds: Normal heart sounds. No murmur heard. Pulmonary:      Effort: Pulmonary effort is normal. No respiratory distress. Breath sounds: Normal breath sounds. No wheezing or rales. Abdominal:      General: Bowel sounds are normal. There is no distension. Palpations: Abdomen is soft. There is no mass. Tenderness: There is no abdominal tenderness. There is no guarding or rebound. Musculoskeletal:         General: Normal range of motion. Cervical back: Normal range of motion and neck supple. Skin:     General: Skin is warm and dry. Coloration: Skin is not pale. Neurological:      Mental Status: He is alert and oriented to person, place, and time.    Psychiatric:         Behavior: Behavior normal.

## 2021-12-23 ENCOUNTER — TELEPHONE (OUTPATIENT)
Dept: GASTROENTEROLOGY | Age: 66
End: 2021-12-23

## 2021-12-23 DIAGNOSIS — R79.0 LOW FERRITIN: Primary | ICD-10-CM

## 2021-12-23 NOTE — TELEPHONE ENCOUNTER
12-23-21 Called to remind the patient that he is needing to get his labs done.      Verbal agreement per patient

## 2021-12-23 NOTE — TELEPHONE ENCOUNTER
----- Message from JESIKA SANDY [facility] sent at 12/2/2021  9:39 AM CST -----  Regarding: checkout note 12/2/21  Labs in 6 weeks

## 2022-01-03 NOTE — PROGRESS NOTES
"Chief Complaint  Scarring of lung (per pt he is sick, coughing clear suptum; pulse ox drops c lowest # being 90%; started a week ago; denies being febrile  )    Subjective    History of Present Illness {CC  Problem List  Visit Diagnosis   Encounters  Notes  Medications  Labs  Result Review Imaging  Media     Roc Rees presents to [facility] for:    Management of COPD, lung scarring and lung nodule.  His alpha-1 testing is normal. Most recent FEV1 was 48.  He is a former smoker.  Low-dose lung cancer imaging due June 2022.  He has had a right upper lobe 4 mm nodule present since 2007.  Occupational history positive for coal mining.  He was a  for approximately 10 years.  He was involved in an accident while mining and lost his right leg below the knee.  He is obese.  He is typically followed by Dr. Morrison.  He has been given Breztri which was very helpful however he developed bowel and urine issues and was switched to Breo 100.  Indicates the Breo was not helping him enough.  He switched back to the Breztri. He has been doing well until recently.  He called for an urgent visit today.  He indicates he thinks it may be secondary to \"cheating a little bit with smoking\".  He is also had some post nasal drainage.  He has not started any medications for this.  He is wondering about Nicotrol Inhaler.  He is obese.  He also has sleep apnea.  He is on a CPAP device.  He is compliant with this and benefiting from it.  Since becoming ill he is use the CPAP some in the day with relief.       Prior to Admission medications    Medication Sig Start Date End Date Taking? Authorizing Provider   albuterol sulfate  (90 Base) MCG/ACT inhaler Inhale 2 puffs Every 4 (Four) Hours As Needed for Wheezing or Shortness of Air. 5/17/21   Roc Morrison MD   aspirin 81 MG EC tablet Take 81 mg by mouth Daily.    Provider, MD Gwendolyn   Fluticasone " "Furoate-Vilanterol (Breo Ellipta) 100-25 MCG/INH inhaler Inhale 1 puff Daily for 14 days. 8/16/21 8/30/21  Bernadette Sparks APRN   hydroCHLOROthiazide (MICROZIDE) 12.5 MG capsule Take 12.5 mg by mouth Every Morning. 6/26/21   Gwendolyn La MD   losartan (COZAAR) 100 MG tablet Take 100 mg by mouth Daily. 7/23/21   Gwendolyn La MD   losartan-hydrochlorothiazide (HYZAAR) 100-12.5 MG per tablet Take 1 tablet by mouth Daily.    Gwendolyn La MD   tamsulosin (FLOMAX) 0.4 MG capsule 24 hr capsule Take 2 capsules by mouth Daily. 1/6/21   Howie Sosa MD   Testosterone (Axiron) 30 MG/ACT solution Apply to underarms daily 8/4/21   Howie Sosa MD   triamcinolone (KENALOG) 0.1 % ointment APPLY TWICE A DAY TO EARS AND NECK FOR 2 WEEKS AT A TIME. 10/30/20   Gwendolyn La MD   vardenafil (LEVITRA) 20 MG tablet Take 1 tablet by mouth As Needed for Erectile Dysfunction. 1/6/21   Howie Sosa MD       Social History     Socioeconomic History   • Marital status:    Tobacco Use   • Smoking status: Former Smoker   • Smokeless tobacco: Never Used   • Tobacco comment: occassionally smokes trying to quit as of 4/16/2019   Vaping Use   • Vaping Use: Never used   Substance and Sexual Activity   • Alcohol use: No   • Drug use: No   • Sexual activity: Defer       Objective   Vital Signs:   /80   Pulse 79   Ht 177.8 cm (70\")   Wt 113 kg (248 lb 12.8 oz)   SpO2 92%   BMI 35.70 kg/m²     Physical Exam  Constitutional:       Appearance: He is morbidly obese.      Interventions: Face mask in place.   Cardiovascular:      Rate and Rhythm: Normal rate and regular rhythm.      Heart sounds: No murmur heard.      Pulmonary:      Effort: Pulmonary effort is normal. No tachypnea, accessory muscle usage or respiratory distress.      Breath sounds: Rales present.   Musculoskeletal:      Right lower leg: No edema.      Left lower leg: No edema.   Neurological:      Mental " Status: He is alert and oriented to person, place, and time.        Result Review :{ Labs  Result Review  Imaging  Med Tab  Media :      My interpretation of the PFT: none for this visit    Results for orders placed during the hospital encounter of 09/11/19    Full Pulmonary Function Test With Bronchodilator    Narrative  [facility] - Pulmonary Function Test    [address] [place]  [place]  [address]  [phone]    Patient : Roc Rees  MRN : [id number]  CSN : [id number]  Pulmonologist : Marv Graves MD  Date : 9/12/2019    ______________________________________________________________________    Interpretation :  1.  Spirometry is consistent with a severe obstructive ventilatory defect.  2.  Lung volumes reveal hyperinflation.  There is also a mild decrease in vital capacity second to obstruction and a mild decrease in inspiratory capacity.  3.  Diffusion capacity is within normal limits particularly when corrected for alveolar volume.      Marv Graves MD      My interpretation of imaging:  None for this visit    My interpretation of labs: none for this visit      Assessment and Plan {CC Problem List  Visit Diagnosis  ROS  Review (Popup)  iTaggit Maintenance  Quality  BestPractice  Medications  SmartSets  SnapShot Encounters  Media      Diagnoses and all orders for this visit:    1. COPD, group C, by GOLD 2017 classification ([facility]) (Primary)  Comments:  Continue Breztri.  Steroids for exacerbation.  If no better we will do a nebulizer with albuterol.  If bronchodilators are not beneficial we will treat PND  Orders:  -     predniSONE (DELTASONE) 10 MG tablet; Take 4 tabs daily x 3 days, then take 3 tabs daily x 3 days, then take 2 tabs daily x 3 days, then take 1 tab daily x 3 days  Dispense: 31 tablet; Refill: 0    2. Class 2 severe obesity due to excess calories with serious comorbidity and body mass index (BMI) of 37.0 to 37.9 in adult ([facility])  Comments:  Education material  provided after visit summary about obesity and elevated BMI    3. Lung nodule  Comments:  Repeat CT due June 2022.  He will be seeing me in February we will make arrangements    4. Scarring of lung  Comments:  Repeat CT due June 2022.  He will be seeing me in February we will make arrangements    5. Personal history of nicotine dependence  Comments:  Recommend strict avoidance of smoking.  Okay to try Nicotrol inhaler.  Discontinue if breathing complications arise    6. Occupational exposure to coal dust  Comments:  Will be due for update PFTs when he returns in February      If breathing does not improve with aggressive bronchodilation we will treat allergies and postnasal complaints as well as assess him for oxygen.    Patient's Body mass index is 35.7 kg/m². indicating that he is obese (BMI >30). Educational material provided after visit summary about elevated BMI        Bernadette Sparks, GALINA  1/4/2022  16:09 CST    Follow Up {Instructions Charge Capture  Follow-up Communications   Return for Keep f/u appt same.    Patient was given instructions and counseling regarding his condition or for health maintenance advice. Please see specific information pulled into the AVS if appropriate.

## 2022-01-04 ENCOUNTER — OFFICE VISIT (OUTPATIENT)
Dept: PULMONOLOGY | Facility: CLINIC | Age: 67
End: 2022-01-04

## 2022-01-04 VITALS
WEIGHT: 248.8 LBS | DIASTOLIC BLOOD PRESSURE: 80 MMHG | BODY MASS INDEX: 35.62 KG/M2 | HEART RATE: 79 BPM | HEIGHT: 70 IN | OXYGEN SATURATION: 92 % | SYSTOLIC BLOOD PRESSURE: 138 MMHG

## 2022-01-04 DIAGNOSIS — E66.01 CLASS 2 SEVERE OBESITY DUE TO EXCESS CALORIES WITH SERIOUS COMORBIDITY AND BODY MASS INDEX (BMI) OF 37.0 TO 37.9 IN ADULT: Chronic | ICD-10-CM

## 2022-01-04 DIAGNOSIS — J44.9 COPD, GROUP C, BY GOLD 2017 CLASSIFICATION: Primary | Chronic | ICD-10-CM

## 2022-01-04 DIAGNOSIS — Z87.891 PERSONAL HISTORY OF NICOTINE DEPENDENCE: Chronic | ICD-10-CM

## 2022-01-04 DIAGNOSIS — J98.4 SCARRING OF LUNG: Chronic | ICD-10-CM

## 2022-01-04 DIAGNOSIS — R79.0 LOW FERRITIN: ICD-10-CM

## 2022-01-04 DIAGNOSIS — Z57.2 OCCUPATIONAL EXPOSURE TO COAL DUST: Chronic | ICD-10-CM

## 2022-01-04 DIAGNOSIS — R91.1 LUNG NODULE: Chronic | ICD-10-CM

## 2022-01-04 PROBLEM — K62.1 COLORECTAL POLYPS: Status: ACTIVE | Noted: 2022-01-04

## 2022-01-04 PROBLEM — K63.5 COLORECTAL POLYPS: Status: ACTIVE | Noted: 2022-01-04

## 2022-01-04 LAB
ANISOCYTOSIS: ABNORMAL
BASOPHILS ABSOLUTE: 0.1 K/UL (ref 0–0.2)
BASOPHILS RELATIVE PERCENT: 1 % (ref 0–1)
EOSINOPHILS ABSOLUTE: 0.3 K/UL (ref 0–0.6)
EOSINOPHILS RELATIVE PERCENT: 2.7 % (ref 0–5)
FERRITIN: 11 NG/ML (ref 30–400)
HCT VFR BLD CALC: 55.3 % (ref 42–52)
HEMOGLOBIN: 15.8 G/DL (ref 14–18)
HYPOCHROMIA: ABNORMAL
IMMATURE GRANULOCYTES #: 0.1 K/UL
IRON % SATURATION: 7 % (ref 14–50)
IRON: 27 UG/DL (ref 59–158)
LYMPHOCYTES ABSOLUTE: 2 K/UL (ref 1.1–4.5)
LYMPHOCYTES RELATIVE PERCENT: 20.4 % (ref 20–40)
MCH RBC QN AUTO: 22.1 PG (ref 27–31)
MCHC RBC AUTO-ENTMCNC: 28.6 G/DL (ref 33–37)
MCV RBC AUTO: 77.2 FL (ref 80–94)
MONOCYTES ABSOLUTE: 1 K/UL (ref 0–0.9)
MONOCYTES RELATIVE PERCENT: 10.4 % (ref 0–10)
NEUTROPHILS ABSOLUTE: 6.5 K/UL (ref 1.5–7.5)
NEUTROPHILS RELATIVE PERCENT: 65 % (ref 50–65)
OVALOCYTES: ABNORMAL
PDW BLD-RTO: 19.8 % (ref 11.5–14.5)
PLATELET # BLD: 426 K/UL (ref 130–400)
PLATELET SLIDE REVIEW: ABNORMAL
PMV BLD AUTO: 8.7 FL (ref 9.4–12.4)
RBC # BLD: 7.16 M/UL (ref 4.7–6.1)
TOTAL IRON BINDING CAPACITY: 412 UG/DL (ref 250–400)
WBC # BLD: 9.9 K/UL (ref 4.8–10.8)

## 2022-01-04 PROCEDURE — 99214 OFFICE O/P EST MOD 30 MIN: CPT | Performed by: NURSE PRACTITIONER

## 2022-01-04 RX ORDER — BUDESONIDE, GLYCOPYRROLATE, AND FORMOTEROL FUMARATE 160; 9; 4.8 UG/1; UG/1; UG/1
2 AEROSOL, METERED RESPIRATORY (INHALATION) 2 TIMES DAILY
COMMUNITY
Start: 2021-11-30 | End: 2022-05-31

## 2022-01-04 RX ORDER — FERROUS SULFATE TAB EC 324 MG (65 MG FE EQUIVALENT) 324 (65 FE) MG
TABLET DELAYED RESPONSE ORAL
COMMUNITY
Start: 2021-12-02 | End: 2022-04-18

## 2022-01-04 RX ORDER — PREDNISONE 10 MG/1
TABLET ORAL
Qty: 31 TABLET | Refills: 0 | Status: SHIPPED | OUTPATIENT
Start: 2022-01-04 | End: 2022-02-21

## 2022-01-04 SDOH — HEALTH STABILITY - PHYSICAL HEALTH: OCCUPATIONAL EXPOSURE TO DUST: Z57.2

## 2022-01-04 NOTE — PATIENT INSTRUCTIONS
"BMI for Adults  What is BMI?  Body mass index (BMI) is a number that is calculated from a person's weight and height. BMI can help estimate how much of a person's weight is composed of fat. BMI does not measure body fat directly. Rather, it is an alternative to procedures that directly measure body fat, which can be difficult and expensive.  BMI can help identify people who may be at higher risk for certain medical problems.  What are BMI measurements used for?  BMI is used as a screening tool to identify possible weight problems. It helps determine whether a person is obese, overweight, a healthy weight, or underweight.  BMI is useful for:  · Identifying a weight problem that may be related to a medical condition or may increase the risk for medical problems.  · Promoting changes, such as changes in diet and exercise, to help reach a healthy weight. BMI screening can be repeated to see if these changes are working.  How is BMI calculated?  BMI involves measuring your weight in relation to your height. Both height and weight are measured, and the BMI is calculated from those numbers. This can be done either in English ([place] or metric measurements. Note that charts and online BMI calculators are available to help you find your BMI quickly and easily without having to do these calculations yourself.  To calculate your BMI in English ([place] measurements:    1. Measure your weight in pounds (lb).  2. Multiply the number of pounds by 703.  ? For example, for a person who weighs 180 lb, multiply that number by 703, which equals 126,540.  3. Measure your height in inches. Then multiply that number by itself to get a measurement called \"inches squared.\"  ? For example, for a person who is 70 inches tall, the \"inches squared\" measurement is 70 inches x 70 inches, which equals 4,900 inches squared.  4. Divide the total from step 2 (number of lb x 703) by the total from step 3 (inches squared): 126,540 ÷ 4,900 = 25.8. This is " "your BMI.    To calculate your BMI in metric measurements:  1. Measure your weight in kilograms (kg).  2. Measure your height in meters (m). Then multiply that number by itself to get a measurement called \"meters squared.\"  ? For example, for a person who is 1.75 m tall, the \"meters squared\" measurement is 1.75 m x 1.75 m, which is equal to 3.1 meters squared.  3. Divide the number of kilograms (your weight) by the meters squared number. In this example: 70 ÷ 3.1 = 22.6. This is your BMI.  What do the results mean?  BMI charts are used to identify whether you are underweight, normal weight, overweight, or obese. The following guidelines will be used:  · Underweight: BMI less than 18.5.  · Normal weight: BMI between 18.5 and 24.9.  · Overweight: BMI between 25 and 29.9.  · Obese: BMI of 30 or above.  Keep these notes in mind:  · Weight includes both fat and muscle, so someone with a muscular build, such as an athlete, may have a BMI that is higher than 24.9. In cases like these, BMI is not an accurate measure of body fat.  · To determine if excess body fat is the cause of a BMI of 25 or higher, further assessments may need to be done by a health care provider.  · BMI is usually interpreted in the same way for men and women.  Where to find more information  For more information about BMI, including tools to quickly calculate your BMI, go to these websites:  · Centers for Disease Control and Prevention: www.cdc.gov  · American Heart Association: www.heart.org  · National Heart, Lung, and Blood [place]: www.nhlbi.nih.gov  Summary  · Body mass index (BMI) is a number that is calculated from a person's weight and height.  · BMI may help estimate how much of a person's weight is composed of fat. BMI can help identify those who may be at higher risk for certain medical problems.  · BMI can be measured using English measurements or metric measurements.  · BMI charts are used to identify whether you are underweight, normal " weight, overweight, or obese.  This information is not intended to replace advice given to you by your health care provider. Make sure you discuss any questions you have with your health care provider.  Document Revised: 09/09/2020 Document Reviewed: 07/17/2020  Elsevier Patient Education © 2021 [employer]

## 2022-01-05 ENCOUNTER — TELEPHONE (OUTPATIENT)
Dept: GASTROENTEROLOGY | Age: 67
End: 2022-01-05

## 2022-01-05 DIAGNOSIS — R79.0 ABNORMAL SERUM IRON LEVEL: ICD-10-CM

## 2022-01-05 DIAGNOSIS — R79.0 LOW FERRITIN: Primary | ICD-10-CM

## 2022-01-05 NOTE — TELEPHONE ENCOUNTER
01-05-22 CHANEL Martinez - NP  Lucy Kurtz, MA  Iron level are low with continued low ferritin.  Recommend checking stools for OB and referral to Hematology.  Orders have been placed         Called and notified patent of results and recommendation as per [address]     Routed results to PCP Enrike Chakraborty DO     He will stop by the lab and get the stool labs done but is wanting to wait on the referral to the hematologist for now.        Routed to [address]

## 2022-01-12 DIAGNOSIS — R79.0 LOW FERRITIN: ICD-10-CM

## 2022-01-12 DIAGNOSIS — R79.0 ABNORMAL SERUM IRON LEVEL: ICD-10-CM

## 2022-01-12 LAB
HEMOCCULT SP2 STL QL: NORMAL
HEMOCCULT SP3 STL QL: NORMAL
OCCULT BLOOD DIAGNOSTIC: NORMAL
OCCULT BLOOD QC: NORMAL

## 2022-01-28 DIAGNOSIS — N40.1 BENIGN PROSTATIC HYPERPLASIA WITH LOWER URINARY TRACT SYMPTOMS, SYMPTOM DETAILS UNSPECIFIED: ICD-10-CM

## 2022-01-28 DIAGNOSIS — E29.1 HYPOGONADISM, MALE: ICD-10-CM

## 2022-01-29 LAB
ERYTHROCYTE [DISTWIDTH] IN BLOOD BY AUTOMATED COUNT: 18.5 % (ref 12.3–15.4)
HCT VFR BLD AUTO: 50.9 % (ref 37.5–51)
HGB BLD-MCNC: 15.9 G/DL (ref 13–17.7)
MCH RBC QN AUTO: 22.7 PG (ref 26.6–33)
MCHC RBC AUTO-ENTMCNC: 31.2 G/DL (ref 31.5–35.7)
MCV RBC AUTO: 72.7 FL (ref 79–97)
PLATELET # BLD AUTO: 297 10*3/MM3 (ref 140–450)
PSA SERPL-MCNC: 1.06 NG/ML (ref 0–4)
RBC # BLD AUTO: 7 10*6/MM3 (ref 4.14–5.8)
TESTOST SERPL-MCNC: 472 NG/DL (ref 264–916)
WBC # BLD AUTO: 7.49 10*3/MM3 (ref 3.4–10.8)

## 2022-01-31 DIAGNOSIS — K62.1 COLORECTAL POLYPS: Primary | ICD-10-CM

## 2022-01-31 DIAGNOSIS — K63.5 COLORECTAL POLYPS: Primary | ICD-10-CM

## 2022-02-03 ENCOUNTER — HOSPITAL ENCOUNTER (OUTPATIENT)
Dept: INFUSION THERAPY | Age: 67
End: 2022-02-03

## 2022-02-07 NOTE — PROGRESS NOTES
Chief Complaint  COPD and scarring of lung    Subjective    History of Present Illness {CC  Problem List  Visit Diagnosis   Encounters  Notes  Medications  Labs  Result Review Imaging  Media     Roc Rees presents to [facility] for:    Management of COPD, lung scarring and lung nodule.  His alpha-1 testing is normal. Most recent FEV1 was 48.  He is a former smoker.  Low-dose lung cancer imaging due June 2022.  He has had a right upper lobe 4 mm nodule present since 2007.  Occupational history positive for coal mining.  He was a  for approximately 10 years.  He was involved in an accident while mining and lost his right leg below the knee.  He is obese.  He is typically followed by Dr. Morrison.  He was previously on Breo however switched to Breztri for worsening complaints.  He was recently given a prednisone taper after COPD exacerbation from recurrence of smoking.  He was discouraged from continuing to smoke.  We discussed Nicotrol Inhaler for smoking cessation.  He indicates the steroids helped a lot.  He continues to work on cessation but has not been successful.  He is not ready to establish his stop date yet. He was also had some post nasal drainage.  We discussed treating this if it did not resolve quickly.  He reports this did resolve. He is obese.  He also has sleep apnea.  He is on a CPAP device.  He is compliant with this and benefiting from it.  Blood pressure marginal today.  He is asymptomatic.  He does not routinely monitor at home.  He does have the ability to do so.       Prior to Admission medications    Medication Sig Start Date End Date Taking? Authorizing Provider   albuterol sulfate  (90 Base) MCG/ACT inhaler Inhale 2 puffs Every 4 (Four) Hours As Needed for Wheezing or Shortness of Air. 5/17/21   Roc Morrison MD   aspirin 81 MG EC tablet Take 81 mg by mouth Daily.    Provider, MD Alan Snow  "Aerosphere 160-9-4.8 MCG/ACT aerosol inhaler Inhale 2 puffs 2 (Two) Times a Day. 11/30/21   Gwendolyn La MD   ferrous sulfate 324 (65 Fe) MG tablet delayed-release EC tablet  12/2/21   Gwendolyn La MD   hydroCHLOROthiazide (MICROZIDE) 12.5 MG capsule Take 12.5 mg by mouth Every Morning. 6/26/21   Gwendolyn La MD   losartan (COZAAR) 100 MG tablet Take 100 mg by mouth Daily. 7/23/21   Gwendolyn La MD   losartan-hydrochlorothiazide (HYZAAR) 100-12.5 MG per tablet Take 1 tablet by mouth Daily.    Gwendolyn La MD   predniSONE (DELTASONE) 10 MG tablet Take 4 tabs daily x 3 days, then take 3 tabs daily x 3 days, then take 2 tabs daily x 3 days, then take 1 tab daily x 3 days 1/4/22   Bernadette Sparks APRN   tamsulosin (FLOMAX) 0.4 MG capsule 24 hr capsule Take 2 capsules by mouth Daily. 1/6/21   Howie Sosa MD   Testosterone (Axiron) 30 MG/ACT solution Apply to underarms daily 8/4/21   Howie Sosa MD   triamcinolone (KENALOG) 0.1 % ointment APPLY TWICE A DAY TO EARS AND NECK FOR 2 WEEKS AT A TIME. 10/30/20   Gwendolyn La MD   vardenafil (LEVITRA) 20 MG tablet Take 1 tablet by mouth As Needed for Erectile Dysfunction. 1/6/21   Howie Sosa MD       Social History     Socioeconomic History   • Marital status:    Tobacco Use   • Smoking status: Current Some Day Smoker     Packs/day: 1.00     Years: 35.00     Pack years: 35.00   • Smokeless tobacco: Never Used   • Tobacco comment: occassionally smokes trying to quit as of 4/16/2019   Vaping Use   • Vaping Use: Never used   Substance and Sexual Activity   • Alcohol use: No   • Drug use: No   • Sexual activity: Defer       Objective   Vital Signs:   /84   Pulse 84   Ht 177.8 cm (70\")   Wt 114 kg (250 lb 12.8 oz)   SpO2 97% Comment: ra  BMI 35.99 kg/m²     Physical Exam  Constitutional:       Appearance: He is morbidly obese.      Interventions: Face mask in place.   HENT:     "  Head:      Comments: Hearing aid  Cardiovascular:      Rate and Rhythm: Normal rate and regular rhythm.      Heart sounds: No murmur heard.      Pulmonary:      Effort: Pulmonary effort is normal.      Breath sounds: Normal breath sounds.   Musculoskeletal:      Right lower leg: No edema.      Left lower leg: No edema.   Neurological:      Mental Status: He is alert and oriented to person, place, and time.        Result Review :{ Labs  Result Review  Imaging  Med Tab  Media :      My interpretation of the PFT: none for this visit    Results for orders placed during the hospital encounter of 09/11/19    Full Pulmonary Function Test With Bronchodilator    Narrative  [facility] - Pulmonary Function Test    [address]  [place]  [place]  [address]  [phone]    Patient : Roc Rees  MRN : [id number]  CSN : [id number]  Pulmonologist : Marv Graves MD  Date : 9/12/2019    ______________________________________________________________________    Interpretation :  1.  Spirometry is consistent with a severe obstructive ventilatory defect.  2.  Lung volumes reveal hyperinflation.  There is also a mild decrease in vital capacity second to obstruction and a mild decrease in inspiratory capacity.  3.  Diffusion capacity is within normal limits particularly when corrected for alveolar volume.      Marv Graves MD      My interpretation of imaging:  None for this visit    My interpretation of labs: none for this visit      Assessment and Plan {CC Problem List  Visit Diagnosis  ROS  Review (Popup)  burrp! Maintenance  Quality  BestPractice  Medications  SmartSets  SnapShot Encounters  Media      Diagnoses and all orders for this visit:    1. COPD, group C, by GOLD 2017 classification ([facility]) (Primary)  Comments:  Continue Breztri.  Does not need refills.  Consider nebulizer if worse or recurrent exacerbations.  PFTs with follow-up    2. Class 2 severe obesity due to excess calories with  serious comorbidity and body mass index (BMI) of 37.0 to 37.9 in adult (HCC)  Comments:  Education material provided after visit summary about obesity and elevated BMI    3. Lung nodule  Comments:  4 mm since 2007.  No follow-up per Fleischner guidelines    4. Scarring of lung  Comments:  Secondary to occupational history.  Imaging due again with follow-up.  PFTs with follow-up    5. Personal history of nicotine dependence  Comments:  Recommend smoking cessation.  He is not ready to establish stop date.  Working with nicotine replacement therapy.  Low-dose lung cancer imaging ordered with f/u  Orders:  -      CT Chest Low Dose Cancer Screening WO; Future    6. Occupational exposure to coal dust  Comments:  Recommend annual imaging and PFTs    7. Primary hypertension  Comments:  Borderline.  Education material provided after visit summary about monitoring and lifestyle modifications.  He will begin checking at home    8. Obstructive sleep apnea  Comments:  Continue CPAP.  He is compliant with this and benefiting from it        Patient's Body mass index is 35.99 kg/m². indicating that he is obese (BMI >30). Obesity-related health conditions include the following: obstructive sleep apnea and hypertension. Obesity is unchanged. BMI is is above average; BMI management plan is completed. We discussed Educational material provided after visit summary about elevated BMI.    Bernadette Sparks, APRN  2/21/2022  10:42 CST    Follow Up {Instructions Charge Capture  Follow-up Communications   Return in about 6 months (around 8/21/2022) for ct, FVL with diffusion.    Patient was given instructions and counseling regarding his condition or for health maintenance advice. Please see specific information pulled into the AVS if appropriate.

## 2022-02-07 NOTE — PROGRESS NOTES
Subjective    Mr. Rees is 66 y.o. male    Chief Complaint: Follow up for testosterone/kidney stone.  History of Present Illness  Patient is a 66-year-old gentleman presents for 6-month follow-up.  Patient with history of kidney stone he had a stone in the right kidney which we are following with KUBs. There has been some interval growth in the right mid to upper pole stone seen on previous KUBs no other obvious stones are seen on the KUB today. Had no stone episodes he is doing well has no symptoms today.    He is on testosterone replacement therapy is on testosterone gel. His most recent testosterone was 472, PSA is 1.06, hemoglobin hematocrit within normal range. He has been on testosterone replacement several years he denies any changes in voiding or urine stream.    The following portions of the patient's history were reviewed and updated as appropriate: allergies, current medications, past family history, past medical history, past social history, past surgical history and problem list.    Review of Systems   Constitutional: Negative for chills and fever.   Gastrointestinal: Negative for abdominal pain, anal bleeding and blood in stool.   Genitourinary: Negative for decreased urine volume, difficulty urinating, dysuria, enuresis, flank pain, frequency, genital sores, hematuria, penile discharge, penile pain, penile swelling, scrotal swelling, testicular pain and urgency.         Current Outpatient Medications:   •  albuterol sulfate  (90 Base) MCG/ACT inhaler, Inhale 2 puffs Every 4 (Four) Hours As Needed for Wheezing or Shortness of Air., Disp: 18 g, Rfl: 11  •  aspirin 81 MG EC tablet, Take 81 mg by mouth Daily., Disp: , Rfl:   •  Breztri Aerosphere 160-9-4.8 MCG/ACT aerosol inhaler, Inhale 2 puffs 2 (Two) Times a Day., Disp: , Rfl:   •  ferrous sulfate 324 (65 Fe) MG tablet delayed-release EC tablet, , Disp: , Rfl:   •  hydroCHLOROthiazide (MICROZIDE) 12.5 MG capsule, Take 12.5 mg by mouth Every  Morning., Disp: , Rfl:   •  losartan (COZAAR) 100 MG tablet, Take 100 mg by mouth Daily., Disp: , Rfl:   •  losartan-hydrochlorothiazide (HYZAAR) 100-12.5 MG per tablet, Take 1 tablet by mouth Daily., Disp: , Rfl:   •  Testosterone (Axiron) 30 MG/ACT solution, Apply to underarms daily, Disp: 90 mL, Rfl: 5  •  triamcinolone (KENALOG) 0.1 % ointment, APPLY TWICE A DAY TO EARS AND NECK FOR 2 WEEKS AT A TIME., Disp: , Rfl:   •  vardenafil (LEVITRA) 20 MG tablet, Take 1 tablet by mouth As Needed for Erectile Dysfunction., Disp: 10 tablet, Rfl: 11  •  predniSONE (DELTASONE) 10 MG tablet, Take 4 tabs daily x 3 days, then take 3 tabs daily x 3 days, then take 2 tabs daily x 3 days, then take 1 tab daily x 3 days, Disp: 31 tablet, Rfl: 0  •  tamsulosin (FLOMAX) 0.4 MG capsule 24 hr capsule, Take 2 capsules by mouth Daily., Disp: 180 capsule, Rfl: 3    Past Medical History:   Diagnosis Date   • Class 2 severe obesity due to excess calories with serious comorbidity and body mass index (BMI) of 37.0 to 37.9 in adult ([facility]) 8/10/2021   • COPD, group C, by GOLD 2017 classification ([facility]) 8/10/2021   • History of transfusion 1986   • Hypertension    • Lung nodule 8/10/2021    Right upper lobe, 2007, 4 mm   • Nephrolithiasis    • Occupational exposure to coal dust 8/10/2021   • Personal history of nicotine dependence 8/10/2021   • Scarring of lung 8/10/2021   • Shoulder pain    • SOB (shortness of breath)        Past Surgical History:   Procedure Laterality Date   • BELOW KNEE LEG AMPUTATION Right    • LYMPH NODE BIOPSY     • TOTAL SHOULDER ARTHROPLASTY W/ DISTAL CLAVICLE EXCISION Right 1/22/2019    Procedure: RIGHT  REVERSE TOTAL SHOULDER  ARTHROPLASTY;  Surgeon: Donnie Frias MD;  Location: [facility] OR;  Service: Orthopedics   • URETEROSCOPY LASER LITHOTRIPSY WITH STENT INSERTION Left 4/25/2019    Procedure: CYSTOSCOPY LEFT URETERAL BALLOON DILATION AND URETEROSCOPY LASER LITHOTRIPSY WITH LEFT STENT INSERTION;  Surgeon:  "Tyrel Hilario MD;  Location:  [facility] OR;  Service: Urology   • VEIN SURGERY Left 10/12/2020    Procedure: LEFT SAPHENOUS VEIN CLOSURE using Venaseal;  Surgeon: Edgardo Vargas DO;  Location:  [facility] HYBRID OR 12;  Service: Vascular;  Laterality: Left;       Social History     Socioeconomic History   • Marital status:    Tobacco Use   • Smoking status: Former Smoker   • Smokeless tobacco: Never Used   • Tobacco comment: occassionally smokes trying to quit as of 4/16/2019   Vaping Use   • Vaping Use: Never used   Substance and Sexual Activity   • Alcohol use: No   • Drug use: No   • Sexual activity: Defer       Family History   Problem Relation Age of Onset   • Kidney cancer Father    • No Known Problems Mother        Objective    Temp 98.3 °F (36.8 °C)   Ht 177.8 cm (70\")   Wt 112 kg (248 lb)   BMI 35.58 kg/m²     Physical Exam  Vitals reviewed.   Constitutional:       Appearance: Normal appearance.   HENT:      Head: Normocephalic and atraumatic.      Nose: No congestion.   Pulmonary:      Effort: Pulmonary effort is normal.   Skin:     Coloration: Skin is not pale.   Neurological:      Mental Status: He is alert and oriented to person, place, and time.   Psychiatric:         Behavior: Behavior normal.             Results for orders placed or performed in visit on 02/04/22   POC Urinalysis Dipstick, Multipro    Specimen: Urine   Result Value Ref Range    Color Yellow Yellow, Straw, Dark Yellow, Linda    Clarity, UA Clear Clear    Glucose, UA Negative Negative, 1000 mg/dL (3+) mg/dL    Bilirubin Negative Negative    Ketones, UA Negative Negative    Specific Gravity  1.015 1.005 - 1.030    Blood, UA Trace (A) Negative    pH, Urine 6.5 5.0 - 8.0    Protein, POC Negative Negative mg/dL    Urobilinogen, UA Normal Normal    Nitrite, UA Negative Negative    Leukocytes Negative Negative   International Prostate Symptom Score  The following is posted based on patient questionnaire answers:  0 - not at " all    1-7 mild symptoms  1- Less than one time in five  8-19 moderate symptoms  2 -Less than half the time  20-35 severe symptoms  3 - About half the time  4 - More than half the time  5 - Almost always     For following sections:  Incomplete Emptying: - How often have you had the sensation  of not emptying your bladder completely after you finished urinating?  3  Frequency: -How often have you had to urinate again less than   two hours after you finished urinating?      3  Intermittency: -How often have you found you stopped and started again  Several times when you urinate?       4  Urgency: -How often do you find it difficult to postpone urination?             2  Weak stream: - How often have you had a weak urinary stream?             5  Straining: - How often have you had to push or strain to begin  Urination?          5  Sleeping: -How many times did you most typically get up to urinate   From the time you went to bed at night until the time you got up in the   1  Morning          Total `  23    Quality of Life  How would you feel if you had to live with your urinary condition the way   3  It is now, no better, no worse for the rest of your life?    Where: 0=delighted; 1= pleased, 2= mostly satisfied, 3= mixed, 4 = mostly  Dissatisfied, 5= Unhappy, 6 = terrible    KUB independent review    A KUB is available for me to review today.  The image is inspected for a bowel gas pattern and the general bone structure of the spine and pelvis. The kidneys are then inspected closely.  Renal outline is noted if identifiable. The kidney, collecting system, and anticipated path of the ureter are examined for calcifications including those in the true pelvis.  This film reveals:    On the right there is a single renal stone measuring 19 mm.    On the left there are no calcificaitons seen in the kidney or the expected course of the ureter.    Assessment and Plan    Diagnoses and all orders for this visit:    1. Right kidney  stone (Primary)  -     XR Abdomen KUB; Future    2. Hypogonadism in male  -     Hemoglobin & Hematocrit, Blood; Future  -     Testosterone; Future    Regarding his stone it has shown some interval growth we discussed ESWL for now follow-up 6 months with KUB prior. No other obvious stones were seen.    He is on Axiron testosterone gel. PSA was 1.06, hemoglobin hematocrit were within normal range, testosterone was 472. We will continue current treatment dosage for his testosterone replacement. Follow-up 6 months with labs prior.

## 2022-02-08 ENCOUNTER — HOSPITAL ENCOUNTER (OUTPATIENT)
Dept: GENERAL RADIOLOGY | Facility: HOSPITAL | Age: 67
Discharge: HOME | End: 2022-02-08
Admitting: UROLOGY

## 2022-02-08 ENCOUNTER — OFFICE VISIT (OUTPATIENT)
Dept: UROLOGY | Facility: CLINIC | Age: 67
End: 2022-02-08

## 2022-02-08 VITALS — BODY MASS INDEX: 35.5 KG/M2 | WEIGHT: 248 LBS | TEMPERATURE: 98.3 F | HEIGHT: 70 IN

## 2022-02-08 DIAGNOSIS — N20.0 RIGHT KIDNEY STONE: Primary | ICD-10-CM

## 2022-02-08 DIAGNOSIS — N20.0 NEPHROLITHIASIS: ICD-10-CM

## 2022-02-08 DIAGNOSIS — E29.1 HYPOGONADISM IN MALE: ICD-10-CM

## 2022-02-08 PROCEDURE — 74018 RADEX ABDOMEN 1 VIEW: CPT

## 2022-02-08 PROCEDURE — 99214 OFFICE O/P EST MOD 30 MIN: CPT | Performed by: PHYSICIAN ASSISTANT

## 2022-02-09 DIAGNOSIS — N52.9 IMPOTENCE OF ORGANIC ORIGIN: ICD-10-CM

## 2022-02-09 RX ORDER — VARDENAFIL HYDROCHLORIDE 20 MG/1
20 TABLET ORAL AS NEEDED
Qty: 10 TABLET | Refills: 11 | Status: SHIPPED | OUTPATIENT
Start: 2022-02-09 | End: 2023-02-08 | Stop reason: SDUPTHER

## 2022-02-21 ENCOUNTER — OFFICE VISIT (OUTPATIENT)
Dept: PULMONOLOGY | Facility: CLINIC | Age: 67
End: 2022-02-21

## 2022-02-21 VITALS
HEIGHT: 70 IN | WEIGHT: 250.8 LBS | OXYGEN SATURATION: 97 % | HEART RATE: 84 BPM | BODY MASS INDEX: 35.9 KG/M2 | DIASTOLIC BLOOD PRESSURE: 84 MMHG | SYSTOLIC BLOOD PRESSURE: 140 MMHG

## 2022-02-21 DIAGNOSIS — J44.9 COPD, GROUP C, BY GOLD 2017 CLASSIFICATION: Primary | Chronic | ICD-10-CM

## 2022-02-21 DIAGNOSIS — G47.33 OBSTRUCTIVE SLEEP APNEA: Chronic | ICD-10-CM

## 2022-02-21 DIAGNOSIS — E66.01 CLASS 2 SEVERE OBESITY DUE TO EXCESS CALORIES WITH SERIOUS COMORBIDITY AND BODY MASS INDEX (BMI) OF 37.0 TO 37.9 IN ADULT: Chronic | ICD-10-CM

## 2022-02-21 DIAGNOSIS — Z57.2 OCCUPATIONAL EXPOSURE TO COAL DUST: Chronic | ICD-10-CM

## 2022-02-21 DIAGNOSIS — I10 PRIMARY HYPERTENSION: Chronic | ICD-10-CM

## 2022-02-21 DIAGNOSIS — J98.4 SCARRING OF LUNG: Chronic | ICD-10-CM

## 2022-02-21 DIAGNOSIS — R91.1 LUNG NODULE: Chronic | ICD-10-CM

## 2022-02-21 DIAGNOSIS — Z87.891 PERSONAL HISTORY OF NICOTINE DEPENDENCE: Chronic | ICD-10-CM

## 2022-02-21 PROCEDURE — 99214 OFFICE O/P EST MOD 30 MIN: CPT | Performed by: NURSE PRACTITIONER

## 2022-02-21 SDOH — HEALTH STABILITY - PHYSICAL HEALTH: OCCUPATIONAL EXPOSURE TO DUST: Z57.2

## 2022-02-21 NOTE — PATIENT INSTRUCTIONS
"BMI for Adults  What is BMI?  Body mass index (BMI) is a number that is calculated from a person's weight and height. BMI can help estimate how much of a person's weight is composed of fat. BMI does not measure body fat directly. Rather, it is an alternative to procedures that directly measure body fat, which can be difficult and expensive.  BMI can help identify people who may be at higher risk for certain medical problems.  What are BMI measurements used for?  BMI is used as a screening tool to identify possible weight problems. It helps determine whether a person is obese, overweight, a healthy weight, or underweight.  BMI is useful for:  · Identifying a weight problem that may be related to a medical condition or may increase the risk for medical problems.  · Promoting changes, such as changes in diet and exercise, to help reach a healthy weight. BMI screening can be repeated to see if these changes are working.  How is BMI calculated?  BMI involves measuring your weight in relation to your height. Both height and weight are measured, and the BMI is calculated from those numbers. This can be done either in English ([place] or metric measurements. Note that charts and online BMI calculators are available to help you find your BMI quickly and easily without having to do these calculations yourself.  To calculate your BMI in English ([place] measurements:    1. Measure your weight in pounds (lb).  2. Multiply the number of pounds by 703.  ? For example, for a person who weighs 180 lb, multiply that number by 703, which equals 126,540.  3. Measure your height in inches. Then multiply that number by itself to get a measurement called \"inches squared.\"  ? For example, for a person who is 70 inches tall, the \"inches squared\" measurement is 70 inches x 70 inches, which equals 4,900 inches squared.  4. Divide the total from step 2 (number of lb x 703) by the total from step 3 (inches squared): 126,540 ÷ 4,900 = 25.8. This is " "your BMI.    To calculate your BMI in metric measurements:  1. Measure your weight in kilograms (kg).  2. Measure your height in meters (m). Then multiply that number by itself to get a measurement called \"meters squared.\"  ? For example, for a person who is 1.75 m tall, the \"meters squared\" measurement is 1.75 m x 1.75 m, which is equal to 3.1 meters squared.  3. Divide the number of kilograms (your weight) by the meters squared number. In this example: 70 ÷ 3.1 = 22.6. This is your BMI.  What do the results mean?  BMI charts are used to identify whether you are underweight, normal weight, overweight, or obese. The following guidelines will be used:  · Underweight: BMI less than 18.5.  · Normal weight: BMI between 18.5 and 24.9.  · Overweight: BMI between 25 and 29.9.  · Obese: BMI of 30 or above.  Keep these notes in mind:  · Weight includes both fat and muscle, so someone with a muscular build, such as an athlete, may have a BMI that is higher than 24.9. In cases like these, BMI is not an accurate measure of body fat.  · To determine if excess body fat is the cause of a BMI of 25 or higher, further assessments may need to be done by a health care provider.  · BMI is usually interpreted in the same way for men and women.  Where to find more information  For more information about BMI, including tools to quickly calculate your BMI, go to these websites:  · Centers for Disease Control and Prevention: www.cdc.gov  · American Heart Association: www.heart.org  · National Heart, Lung, and Blood [facility]res[place]: www.nhlbi.nih.gov  Summary  · Body mass index (BMI) is a number that is calculated from a person's weight and height.  · BMI may help estimate how much of a person's weight is composed of fat. BMI can help identify those who may be at higher risk for certain medical problems.  · BMI can be measured using English measurements or metric measurements.  · BMI charts are used to identify whether you are underweight, normal " weight, overweight, or obese.  This information is not intended to replace advice given to you by your health care provider. Make sure you discuss any questions you have with your health care provider.  Document Revised: 09/09/2020 Document Reviewed: 07/17/2020  [employer] Patient Education © 2021 [employer]    Hypertension, Adult  Hypertension is another name for high blood pressure. High blood pressure forces your heart to work harder to pump blood. This can cause problems over time.  There are two numbers in a blood pressure reading. There is a top number (systolic) over a bottom number (diastolic). It is best to have a blood pressure that is below 120/80. Healthy choices can help lower your blood pressure, or you may need medicine to help lower it.  What are the causes?  The cause of this condition is not known. Some conditions may be related to high blood pressure.  What increases the risk?  · Smoking.  · Having type 2 diabetes mellitus, high cholesterol, or both.  · Not getting enough exercise or physical activity.  · Being overweight.  · Having too much fat, sugar, calories, or salt (sodium) in your diet.  · Drinking too much alcohol.  · Having long-term (chronic) kidney disease.  · Having a family history of high blood pressure.  · Age. Risk increases with age.  · Race. You may be at higher risk if you are .  · Gender. Men are at higher risk than women before age 45. After age 65, women are at higher risk than men.  · Having obstructive sleep apnea.  · Stress.  What are the signs or symptoms?  · High blood pressure may not cause symptoms. Very high blood pressure (hypertensive crisis) may cause:  ? Headache.  ? Feelings of worry or nervousness (anxiety).  ? Shortness of breath.  ? Nosebleed.  ? A feeling of being sick to your stomach (nausea).  ? Throwing up (vomiting).  ? Changes in how you see.  ? Very bad chest pain.  ? Seizures.  How is this treated?  · This condition is treated by  making healthy lifestyle changes, such as:  ? Eating healthy foods.  ? Exercising more.  ? Drinking less alcohol.  · Your health care provider may prescribe medicine if lifestyle changes are not enough to get your blood pressure under control, and if:  ? Your top number is above 130.  ? Your bottom number is above 80.  · Your personal target blood pressure may vary.  Follow these instructions at home:  Eating and drinking    · If told, follow the DASH eating plan. To follow this plan:  ? Fill one half of your plate at each meal with fruits and vegetables.  ? Fill one fourth of your plate at each meal with whole grains. Whole grains include whole-wheat pasta, brown rice, and whole-grain bread.  ? Eat or drink low-fat dairy products, such as skim milk or low-fat yogurt.  ? Fill one fourth of your plate at each meal with low-fat (lean) proteins. Low-fat proteins include fish, chicken without skin, eggs, beans, and tofu.  ? Avoid fatty meat, cured and processed meat, or chicken with skin.  ? Avoid pre-made or processed food.  · Eat less than 1,500 mg of salt each day.  · Do not drink alcohol if:  ? Your doctor tells you not to drink.  ? You are pregnant, may be pregnant, or are planning to become pregnant.  · If you drink alcohol:  ? Limit how much you use to:  § 0-1 drink a day for women.  § 0-2 drinks a day for men.  ? Be aware of how much alcohol is in your drink. In the [place] one drink equals one 12 oz bottle of beer (355 mL), one 5 oz glass of wine (148 mL), or one 1½ oz glass of hard liquor (44 mL).    Lifestyle    · Work with your doctor to stay at a healthy weight or to lose weight. Ask your doctor what the best weight is for you.  · Get at least 30 minutes of exercise most days of the week. This may include walking, swimming, or biking.  · Get at least 30 minutes of exercise that strengthens your muscles (resistance exercise) at least 3 days a week. This may include lifting weights or doing Pilates.  · Do not  use any products that contain nicotine or tobacco, such as cigarettes, e-cigarettes, and chewing tobacco. If you need help quitting, ask your doctor.  · Check your blood pressure at home as told by your doctor.  · Keep all follow-up visits as told by your doctor. This is important.    Medicines  · Take over-the-counter and prescription medicines only as told by your doctor. Follow directions carefully.  · Do not skip doses of blood pressure medicine. The medicine does not work as well if you skip doses. Skipping doses also puts you at risk for problems.  · Ask your doctor about side effects or reactions to medicines that you should watch for.  Contact a doctor if you:  · Think you are having a reaction to the medicine you are taking.  · Have headaches that keep coming back (recurring).  · Feel dizzy.  · Have swelling in your ankles.  · Have trouble with your vision.  Get help right away if you:  · Get a very bad headache.  · Start to feel mixed up (confused).  · Feel weak or numb.  · Feel faint.  · Have very bad pain in your:  ? Chest.  ? Belly (abdomen).  · Throw up more than once.  · Have trouble breathing.  Summary  · Hypertension is another name for high blood pressure.  · High blood pressure forces your heart to work harder to pump blood.  · For most people, a normal blood pressure is less than 120/80.  · Making healthy choices can help lower blood pressure. If your blood pressure does not get lower with healthy choices, you may need to take medicine.  This information is not intended to replace advice given to you by your health care provider. Make sure you discuss any questions you have with your health care provider.  Document Revised: 08/28/2019 Document Reviewed: 08/28/2019  [employer] Patient Education © 2021 [employer].    How to Take Your Blood Pressure  Blood pressure measures how strongly your blood is pressing against the walls of your arteries. Arteries are blood vessels that carry blood from your  heart throughout your body. You can take your blood pressure at home with a machine.  You may need to check your blood pressure at home:  · To check if you have high blood pressure (hypertension).  · To check your blood pressure over time.  · To make sure your blood pressure medicine is working.  Supplies needed:  · Blood pressure machine, or monitor.  · Dining room chair to sit in.  · Table or desk.  · Small notebook.  · Pencil or pen.  How to prepare  Avoid these things for 30 minutes before checking your blood pressure:  · Having drinks with caffeine in them, such as coffee or tea.  · Drinking alcohol.  · Eating.  · Smoking.  · Exercising.  Do these things five minutes before checking your blood pressure:  · Go to the bathroom and pee (urinate).  · Sit in a dining chair. Do not sit in a soft couch or an armchair.  · Be quiet. Do not talk.  How to take your blood pressure  Follow the instructions that came with your machine. If you have a digital blood pressure monitor, these may be the instructions:  1. Sit up straight.  2. Place your feet on the floor. Do not cross your ankles or legs.  3. Rest your left arm at the level of your heart. You may rest it on a table, desk, or chair.  4. Pull up your shirt sleeve.  5. Wrap the blood pressure cuff around the upper part of your left arm. The cuff should be 1 inch (2.5 cm) above your elbow. It is best to wrap the cuff around bare skin.  6. Fit the cuff snugly around your arm. You should be able to place only one finger between the cuff and your arm.  7. Place the cord so that it rests in the bend of your elbow.  8. Press the power button.  9. Sit quietly while the cuff fills with air and loses air.  10. Write down the numbers on the screen.  11. Wait 2-3 minutes and then repeat steps 1-10.  What do the numbers mean?  Two numbers make up your blood pressure. The first number is called systolic pressure. The second is called diastolic pressure. An example of a blood  "pressure reading is \"120 over 80\" (or 120/80).  If you are an adult and do not have a medical condition, use this guide to find out if your blood pressure is normal:  Normal  · First number: below 120.  · Second number: below 80.  Elevated  · First number: 120-129.  · Second number: below 80.  Hypertension stage 1  · First number: 130-139.  · Second number: 80-89.  Hypertension stage 2  · First number: 140 or above.  · Second number: 90 or above.  Your blood pressure is above normal even if only the top or bottom number is above normal.  Follow these instructions at home:  · Check your blood pressure as often as your doctor tells you to.  · Check your blood pressure at the same time every day.  · Take your monitor to your next doctor's appointment. Your doctor will:  ? Make sure you are using it correctly.  ? Make sure it is working right.  · Make sure you understand what your blood pressure numbers should be.  · Tell your doctor if your medicine is causing side effects.  · Keep all follow-up visits as told by your doctor. This is important.  General tips:  · You will need a blood pressure machine, or monitor. Your doctor can suggest a monitor. You can buy one at a [email or web address] or online. When choosing one:  ? Choose one with an arm cuff.  ? Choose one that wraps around your upper arm. Only one finger should fit between your arm and the cuff.  ? Do not choose one that measures your blood pressure from your wrist or finger.  Where to find more information  American Heart Association: www.heart.org  Contact a doctor if:  · Your blood pressure keeps being high.  Get help right away if:  · Your first blood pressure number is higher than 180.  · Your second blood pressure number is higher than 120.  Summary  · Check your blood pressure at the same time every day.  · Avoid caffeine, alcohol, smoking, and exercise for 30 minutes before checking your blood pressure.  · Make sure you understand what your blood pressure numbers " should be.  This information is not intended to replace advice given to you by your health care provider. Make sure you discuss any questions you have with your health care provider.  Document Revised: 12/11/2020 Document Reviewed: 12/11/2020  Elsevier Patient Education © 2021 Elsevier Inc.

## 2022-03-08 NOTE — PROGRESS NOTES
OP HEMATOLOGY/ONCOLOGY CONSULTATION      Pt Name: Darrell Roca: 1955  MRN: [id number]  Referring provider: Thomas Rose   PCP: Shakira Fuentes DO      Date of evaluation: 3/9/2022   History Obtained From:  patient, electronic medical record    CHIEF COMPLAINT:    Chief Complaint   Patient presents with    New Patient     low ferritin/abn serum iron level     HISTORY OF PRESENT ILLNESS:    Micheal Peterson is a 73 y.o.  male referred by Dr. Seymour Pearce for evaluation of iron deficiency. Review of care everywhere reviewed [facility] 4/9/2019 revealing a WBC 5.61, Hgb 15.5, MCV 86.1, MCHC 29.4, ,000. Prior colonoscopy per Dr. Dg Echevarria 6/28/2019 revealed a 5 mm sessile polyp (tubular adenoma, negative for high-grade dysplasia) in the transverse colon, 7 mm sessile polyp  (tubular adenoma, negative for high-grade dysplasia) in the descending colon, diverticular disease in the left colon. Follow-up 5 years. CBC 1/4/2022 revealed a WBC of 9.9 with 65.0% PMN, 20.4% lymphocytes, 10.4% monocyte, Hgb 15.8, MCV 77.2, MCHC 28.6, ,000. Serology 1/4/2022  Serum Fe -  27  TIBC - 412  Fe sat - 7%  Ferritin - 11    FOBT x3 on 1/12/2022 were all NEGATIVE    He is taking 1 iron tablet daily. He does have a history of elevated H/H and was told previously by [facility] doctor\" that his blood was too thick and he needed to donate blood. He reports that he has been donating blood regularly for the past 5 yearsmost recent was in January. He is followed by Dr. Hesham Rosenthal for hypogonadism and is on testosterone replacement. He also has significant COPD with history of tobacco abusesmoking 2 pack/day and stopped smoking 2 months ago. He does use inhalers. He does have hypertension and is on losartan with combination HCTZ.       Past Medical History:   Diagnosis Date    History of blood transfusion     Hypertension        Past Surgical History:   Procedure Laterality Date    COLONOSCOPY      COLONOSCOPY N/A 2019    Dr JULIO Calvert-Diverticular disease-Tubular AP (-) dysplasia x 2    JOINT REPLACEMENT Right     shoulder    LEG AMPUTATION BELOW KNEE Right 1986    SKIN GRAFT Right     leg         Current Outpatient Medications:     albuterol sulfate  (90 Base) MCG/ACT inhaler, as needed, Disp: , Rfl:     BREZTRI AEROSPHERE 160-9-4.8 MCG/ACT AERO, daily, Disp: , Rfl:     aspirin 81 MG EC tablet, Take 81 mg by mouth daily, Disp: , Rfl:     hydroCHLOROthiazide (MICROZIDE) 12.5 MG capsule, Take 12.5 mg by mouth every morning, Disp: , Rfl:     LOSARTAN POTASSIUM-HCTZ PO, Take by mouth daily, Disp: , Rfl:      No Known Allergies    Social History     Tobacco Use    Smoking status: Former Smoker     Types: Cigarettes     Quit date: 2021     Years since quittin.9    Smokeless tobacco: Never Used   Vaping Use    Vaping Use: Never used   Substance Use Topics    Alcohol use: Never    Drug use: Never       Family History   Problem Relation Age of Onset    Kidney Cancer Father     Colon Cancer Neg Hx     Esophageal Cancer Neg Hx     Liver Cancer Neg Hx     Rectal Cancer Neg Hx     Stomach Cancer Neg Hx        Subjective   REVIEW OF SYSTEMS:   Review of Systems   Constitutional: Negative for chills, diaphoresis, fatigue, fever and unexpected weight change. HENT: Negative for mouth sores, nosebleeds, sore throat, trouble swallowing and voice change. Eyes: Negative for photophobia, discharge and itching. Respiratory: Positive for shortness of breath. Negative for cough and wheezing. Cardiovascular: Negative for chest pain, palpitations and leg swelling. Gastrointestinal: Negative for abdominal distention, abdominal pain, blood in stool, constipation, diarrhea, nausea and vomiting. Endocrine: Negative for cold intolerance, heat intolerance, polydipsia and polyuria. Genitourinary: Negative for difficulty urinating, dysuria, hematuria and urgency.    Musculoskeletal: Negative for arthralgias, back pain, joint swelling and myalgias. Skin: Negative for color change and rash. Neurological: Negative for dizziness, tremors, seizures, syncope and light-headedness. Hematological: Negative for adenopathy. Does not bruise/bleed easily. Psychiatric/Behavioral: Negative for behavioral problems and suicidal ideas. The patient is not nervous/anxious. All other systems reviewed and are negative. Objective   /79   Pulse 80   Ht 5' 10\" (1.778 m)   Wt 250 lb 3.2 oz (113.5 kg)   SpO2 92%   BMI 35.90 kg/m²     PHYSICAL EXAM:  Physical Exam  Vitals reviewed. Constitutional:       General: He is not in acute distress. Appearance: He is well-developed. HENT:      Head: Normocephalic and atraumatic. Nose: Nose normal.   Eyes:      General: No scleral icterus. Conjunctiva/sclera: Conjunctivae normal.   Neck:      Vascular: No JVD. Trachea: No tracheal deviation. Cardiovascular:      Rate and Rhythm: Normal rate and regular rhythm. Heart sounds: Normal heart sounds. No murmur heard. Pulmonary:      Effort: Pulmonary effort is normal. No respiratory distress. Breath sounds: Normal breath sounds. No wheezing. Abdominal:      General: Bowel sounds are normal. There is no distension. Palpations: Abdomen is soft. There is no mass. Tenderness: There is no abdominal tenderness. Hernia: A hernia is present. Hernia is present in the ventral area. Musculoskeletal:         General: No tenderness or deformity. Normal range of motion. Skin:     Findings: No erythema or rash. Neurological:      Mental Status: He is alert and oriented to person, place, and time. Psychiatric:         Thought Content: Thought content normal.       CBC reviewed by me  Lab Results   Component Value Date    WBC 6.80 03/09/2022    HGB 17.0 03/09/2022    HCT 57.1 (HH) 03/09/2022    MCV 79.2 03/09/2022     (H) 03/09/2022       VISIT DIAGNOSES  1. Polycythemia    2. Iron deficiency    3. Encounter for donation of whole blood    4. Thrombocytosis        ASSESSMENT/PLAN:    1. Microcytosis -iron deficiency    Review of care everywhere reviewed [facility] 4/9/2019 revealing a WBC 5.61, Hgb 15.5, MCV 86.1, MCHC 29.4, ,000. Prior colonoscopy per Dr. Modesta Mcgarry 6/28/2019 revealed a 5 mm sessile polyp (tubular adenoma, negative for high-grade dysplasia) in the transverse colon, 7 mm sessile polyp  (tubular adenoma, negative for high-grade dysplasia) in the descending colon, diverticular disease in the left colon. Follow-up 5 years. CBC 1/4/2022 revealed a WBC of 9.9 with 65.0% PMN, 20.4% lymphocytes, 10.4% monocyte, Hgb 15.8, MCV 77.2, MCHC 28.6, ,000. Serology 1/4/2022  Serum Fe -  27  TIBC - 412  Fe sat - 7%  Ferritin - 11    FOBT x3 on 1/12/2022 were all NEGATIVE    He is taking 1 iron tablet daily. Urinalysis 2/8/2022 at [facility] revealed trace bloodhe is followed by Dr. Jace Sparrow. He does have iron deficiency without anemia. His iron deficiency could very well be from repeat phlebotomies from donating blood. 2.  Polycythemia. HCT today is 57.1. I discussed polycythemia. He does have a history of elevated H/H and [facility] that his [facility] was too thick and he needed to donate blood. He reports that he has been donating blood regularly for the past 5 yearsmost recent was in January. He is on testosterone and has significant COPD which both could cause secondary polycythemia. He also does take a diuretic. I encouraged him to continue donating blood. I am checking an erythropoietin level, JAK2 with reflex. 3.  Mild thrombocytosis        This could very well be reactive from his iron deficiency. JAK2 has been requested. HEALTH MAINTENANCE    · Prostate cancer screening. Screening PSA 1/28/2022 was 1.060    · Colon cancer screening.   Colonoscopy per Dr. Modesta Mcgarry 6/28/2019 revealed a 5 mm sessile polyp (tubular adenoma, negative for high-grade dysplasia) in the transverse colon, 7 mm sessile polyp  (tubular adenoma, negative for high-grade dysplasia) in the descending colon, diverticular disease in the left colon. Follow-up 5 years. Immunization History   Administered Date(s) Administered    COVID-19, Reyes Radhas, Primary or Immunocompromised, PF, 100mcg/0.5mL 03/01/2021, 03/29/2021     Orders Placed This Encounter   Procedures    Erythropoietin     Standing Status:   Future     Number of Occurrences:   1     Standing Expiration Date:   3/9/2023    Miscellaneous Sendout     Standing Status:   Future     Number of Occurrences:   1     Standing Expiration Date:   3/9/2023     Order Specific Question:   Specify Req. Test (1 Test/Order)     Answer:   SRIRAM 2 with reflex        Return in about 2 months (around 5/9/2022) for With Farhad Harry.       Yaya Frost PA-C  1:20 PM  3/9/2022

## 2022-03-09 ENCOUNTER — HOSPITAL ENCOUNTER (OUTPATIENT)
Dept: INFUSION THERAPY | Age: 67
Discharge: HOME | End: 2022-03-09
Payer: MEDICARE

## 2022-03-09 ENCOUNTER — OFFICE VISIT (OUTPATIENT)
Dept: HEMATOLOGY | Age: 67
End: 2022-03-09
Payer: COMMERCIAL

## 2022-03-09 VITALS
WEIGHT: 250.2 LBS | BODY MASS INDEX: 35.82 KG/M2 | HEART RATE: 80 BPM | HEIGHT: 70 IN | SYSTOLIC BLOOD PRESSURE: 120 MMHG | OXYGEN SATURATION: 92 % | DIASTOLIC BLOOD PRESSURE: 79 MMHG

## 2022-03-09 DIAGNOSIS — Z52.000 ENCOUNTER FOR DONATION OF WHOLE BLOOD: ICD-10-CM

## 2022-03-09 DIAGNOSIS — K63.5 COLORECTAL POLYPS: ICD-10-CM

## 2022-03-09 DIAGNOSIS — D75.1 POLYCYTHEMIA: Primary | ICD-10-CM

## 2022-03-09 DIAGNOSIS — D75.1 POLYCYTHEMIA: ICD-10-CM

## 2022-03-09 DIAGNOSIS — K62.1 COLORECTAL POLYPS: ICD-10-CM

## 2022-03-09 DIAGNOSIS — D75.839 THROMBOCYTOSIS: ICD-10-CM

## 2022-03-09 DIAGNOSIS — E61.1 IRON DEFICIENCY: ICD-10-CM

## 2022-03-09 LAB
HCT VFR BLD CALC: 57.1 % (ref 40.1–51)
HEMOGLOBIN: 17 G/DL (ref 13.7–17.5)
MCH RBC QN AUTO: 23.6 PG (ref 25.7–32.2)
MCHC RBC AUTO-ENTMCNC: 29.8 G/DL (ref 32.3–36.5)
MCV RBC AUTO: 79.2 FL (ref 79–92.2)
PDW BLD-RTO: 18.4 % (ref 11.6–14.4)
PLATELET # BLD: 346 K/UL (ref 163–337)
PMV BLD AUTO: 8.9 FL (ref 7.4–10.4)
RBC # BLD: 7.21 M/UL (ref 4.63–6.08)
WBC # BLD: 6.8 K/UL (ref 4.23–9.07)

## 2022-03-09 PROCEDURE — 36415 COLL VENOUS BLD VENIPUNCTURE: CPT

## 2022-03-09 PROCEDURE — 85027 COMPLETE CBC AUTOMATED: CPT

## 2022-03-09 PROCEDURE — 99203 OFFICE O/P NEW LOW 30 MIN: CPT | Performed by: PHYSICIAN ASSISTANT

## 2022-03-09 ASSESSMENT — ENCOUNTER SYMPTOMS
SORE THROAT: 0
EYE DISCHARGE: 0
COLOR CHANGE: 0
CONSTIPATION: 0
SHORTNESS OF BREATH: 1
BACK PAIN: 0
DIARRHEA: 0
WHEEZING: 0
ABDOMINAL PAIN: 0
VOICE CHANGE: 0
TROUBLE SWALLOWING: 0
COUGH: 0
BLOOD IN STOOL: 0
VOMITING: 0
EYE ITCHING: 0
ABDOMINAL DISTENTION: 0
NAUSEA: 0
PHOTOPHOBIA: 0

## 2022-03-12 LAB — ERYTHROPOIETIN: 18 MU/ML (ref 4–27)

## 2022-03-22 LAB
Lab: NORMAL
REPORT: NORMAL
THIS TEST SENT TO: NORMAL

## 2022-03-27 NOTE — ADDENDUM NOTE
Encounter addended by: Rocio Salas MA on: 3/26/2022 9:07 PM   Actions taken: Charge Capture section accepted

## 2022-04-15 ENCOUNTER — TELEPHONE (OUTPATIENT)
Dept: VASCULAR SURGERY | Facility: CLINIC | Age: 67
End: 2022-04-15

## 2022-04-15 NOTE — TELEPHONE ENCOUNTER
Spoke with Mr Rees reminding him of his appointment for Monday, April 18th, 2022. [facility] am for 7 o'clock testing and follow up afterwards at 930 am with Felicia MOJICA. Mr Rees confirmed he would be here.

## 2022-04-18 ENCOUNTER — OFFICE VISIT (OUTPATIENT)
Dept: VASCULAR SURGERY | Facility: CLINIC | Age: 67
End: 2022-04-18

## 2022-04-18 ENCOUNTER — HOSPITAL ENCOUNTER (OUTPATIENT)
Dept: ULTRASOUND IMAGING | Facility: HOSPITAL | Age: 67
Discharge: HOME | End: 2022-04-18

## 2022-04-18 VITALS
SYSTOLIC BLOOD PRESSURE: 130 MMHG | OXYGEN SATURATION: 95 % | BODY MASS INDEX: 35.5 KG/M2 | RESPIRATION RATE: 18 BRPM | DIASTOLIC BLOOD PRESSURE: 90 MMHG | HEIGHT: 70 IN | WEIGHT: 248 LBS | HEART RATE: 73 BPM

## 2022-04-18 DIAGNOSIS — I87.322 CHRONIC VENOUS HYPERTENSION WITH INFLAMMATION INVOLVING LEFT SIDE: ICD-10-CM

## 2022-04-18 DIAGNOSIS — I10 ESSENTIAL HYPERTENSION: ICD-10-CM

## 2022-04-18 DIAGNOSIS — I65.23 BILATERAL CAROTID ARTERY STENOSIS: ICD-10-CM

## 2022-04-18 DIAGNOSIS — I73.9 PAD (PERIPHERAL ARTERY DISEASE): ICD-10-CM

## 2022-04-18 DIAGNOSIS — Z72.0 TOBACCO USE: ICD-10-CM

## 2022-04-18 DIAGNOSIS — I73.9 PAD (PERIPHERAL ARTERY DISEASE): Primary | ICD-10-CM

## 2022-04-18 PROCEDURE — 93923 UPR/LXTR ART STDY 3+ LVLS: CPT | Performed by: SURGERY

## 2022-04-18 PROCEDURE — 93880 EXTRACRANIAL BILAT STUDY: CPT | Performed by: SURGERY

## 2022-04-18 PROCEDURE — 93923 UPR/LXTR ART STDY 3+ LVLS: CPT

## 2022-04-18 PROCEDURE — 93880 EXTRACRANIAL BILAT STUDY: CPT

## 2022-04-18 PROCEDURE — 99214 OFFICE O/P EST MOD 30 MIN: CPT | Performed by: NURSE PRACTITIONER

## 2022-04-18 NOTE — PROGRESS NOTES
"4/18/2022        Medhat Petty,    [address] [place] [address]    Roc Rees  1955    Chief Complaint   Patient presents with   • Carotid Artery Disease     1 year follow-up with US bilateral carotid arteries 4/18/22.  Pt denies any stroke-like symptom.   • Peripheral Vascular Disease     1 year follow-up with US pad ankle/brach ind ext comp 4/18/22.   • Current Smoker     Pt confirmed he only smokes one cigarette occasionally.       Dear Medhat Petty,         HPI  I had the pleasure of seeing your patient Rco Rees in the office today.  As you recall, Roc Rees is a 66 y.o.  male who we are following for venous insufficiency and carotid occlusive disease.  He does have a history of right below-knee amputation following trauma.   He has previously undergone greater saphenous vein stripped from the knee to the ankle in an effort to save his right lower extremity.  He did undergo endovenous ablation of the left lower extremity with venaseal on 10/12/2020.   He is doing well and denies any strokelike symptoms or claudication.  He is maintained on aspirin.  He did have noninvasive testing performed today, which I did review in office.     Review of Systems   Constitutional: Negative.    HENT: Negative.    Eyes: Negative.    Respiratory: Negative.    Cardiovascular: Positive for leg swelling.   Gastrointestinal: Negative.    Endocrine: Negative.    Genitourinary: Negative.    Musculoskeletal: Positive for gait problem.   Skin: Positive for color change.   Allergic/Immunologic: Negative.    Hematological: Negative.    Psychiatric/Behavioral: Negative.    All other systems reviewed and are negative.         /90 (BP Location: Left arm, Patient Position: Sitting, Cuff Size: Adult)   Pulse 73   Resp 18   Ht 177.8 cm (70\")   Wt 112 kg (248 lb)   SpO2 95%   BMI 35.58 kg/m²   Physical Exam  Vitals and nursing note reviewed.   Constitutional:       General: He is not in acute distress.     " Appearance: Normal appearance. He is well-developed. He is obese. He is not diaphoretic.   HENT:      Head: Normocephalic and atraumatic.   Eyes:      Pupils: Pupils are equal, round, and reactive to light.   Neck:      Vascular: No carotid bruit or JVD.   Cardiovascular:      Rate and Rhythm: Normal rate and regular rhythm.      Pulses: Normal pulses.           Femoral pulses are 2+ on the right side and 2+ on the left side.       Popliteal pulses are 2+ on the right side and 2+ on the left side.        Dorsalis pedis pulses are 2+ on the left side.        Posterior tibial pulses are 2+ on the left side.      Heart sounds: Normal heart sounds, S1 normal and S2 normal. No murmur heard.    No friction rub. No gallop.      Comments: Varicose veins to his left lower extremity.  Pulmonary:      Effort: Pulmonary effort is normal.      Breath sounds: Normal breath sounds.   Abdominal:      General: Bowel sounds are normal. There is no abdominal bruit.      Palpations: Abdomen is soft.      Tenderness: There is no abdominal tenderness.   Musculoskeletal:         General: Deformity (right BKA) present. Normal range of motion.      Right Lower Extremity: Right leg is amputated below knee.   Skin:     General: Skin is warm and dry.      Findings: Rash (Eczema to left ankle) present.   Neurological:      General: No focal deficit present.      Mental Status: He is alert and oriented to person, place, and time.      Cranial Nerves: No cranial nerve deficit.   Psychiatric:         Mood and Affect: Mood normal.         Behavior: Behavior normal.         Thought Content: Thought content normal.         Judgment: Judgment normal.     Diagnostic data:  US Carotid Bilateral    Result Date: 4/18/2022  Narrative: History: Carotid occlusive disease      Impression: Impression: 1. There is less than 50% stenosis of the right internal carotid artery. 2. There is less than 50% stenosis of the left internal carotid artery. 3. Antegrade  flow is demonstrated in bilateral vertebral arteries.  Comments: Bilateral carotid vertebral arterial duplex scan was performed.  Grayscale imaging shows intimal thickening and calcified elements at the carotid bifurcation. The right internal carotid artery peak systolic velocity is 63.3 cm/sec. The end-diastolic velocity is 14.9 cm/sec. The right ICA/CCA ratio is approximately 0.8 . These findings correlate with less than 50% stenosis of the right internal carotid artery.  Grayscale imaging shows intimal thickening and calcified elements at the carotid bifurcation. The left internal carotid artery peak systolic velocity is 81.3 cm/sec. The end-diastolic velocity is 29.3 cm/sec. The left ICA/CCA ratio is approximately 0.8 . These findings correlate with less than 50% stenosis of the left internal carotid artery.  Antegrade flow is demonstrated in bilateral vertebral arteries.  This report was finalized on 04/18/2022 08:58 by Dr. Edgardo Vargas MD.    US Ankle / Brachial Indices Extremity Complete    Result Date: 4/18/2022  Narrative:  History: PAD  Comments: Left lower extremity arterial with multi-level pulse volume recordings and segmental pressures were performed at rest and stress.  The left ankle/brachial index is 1.18. The waveforms are triphasic without dampening. These findings are consistent with no significant arterial insufficiency of the left lower extremity at rest.   Postexercise ABIs 1.21.      Impression: Impression: 1. No significant arterial insufficiency of the left lower extremity at rest.    This report was finalized on 04/18/2022 08:56 by Dr. Edgardo Vargas MD.       Patient Active Problem List   Diagnosis   • BPH (benign prostatic hypertrophy) with urinary obstruction   • Impotence due to erectile dysfunction   • Hypogonadism in male   • Erectile dysfunction   • Benign prostatic hyperplasia with lower urinary tract symptoms   • Rotator cuff arthropathy of right shoulder   • Primary  osteoarthritis of right shoulder   • Microscopic hematuria   • Renal stone   • Hydronephrosis with renal and ureteral calculus obstruction   • Ureteral stone   • Renal cyst   • Ureteral stone with hydronephrosis   • Hypertension   • Chronic venous hypertension with inflammation involving left side   • COPD, group C, by GOLD 2017 classification ([facility])   • Personal history of nicotine dependence   • Scarring of lung   • Occupational exposure to coal dust   • Class 2 severe obesity due to excess calories with serious comorbidity and body mass index (BMI) of 37.0 to 37.9 in adult ([facility])   • Lung nodule   • Colorectal polyps   • Obstructive sleep apnea         ICD-10-CM ICD-9-CM   1. PAD (peripheral artery disease) ([facility])  I73.9 443.9   2. Bilateral carotid artery stenosis  I65.23 433.10     433.30   3. Chronic venous hypertension with inflammation involving left side  I87.322 459.32   4. Essential hypertension  I10 401.9   5. Tobacco use  Z72.0 305.1         Plan: After thoroughly evaluating Roc Rees, I believe the best course of action is to remain conservative from vascular surgery standpoint.  Currently he is doing well denies any claudication to his left lower extremity.  I did review his testing which shows no arterial insufficiency to his left lower extremity.  His carotid duplex shows less than 50% carotid stenosis bilaterally.  We will see him back in 1 year with repeat noninvasive testing for continued surveillance, including ABIs and carotid duplex.  I would like him to continue wearing compression stocking on his left leg as previously discussed.  I did discuss vascular risk factors as they pertain to the progression of vascular disease including controlling his hypertension.  His blood pressure stable on his current medication regimen. Patient's Body mass index is 35.58 kg/m². BMI is above normal parameters. Recommendations include: educational material. The patient can continue taking their current  medication regimen as previously planned.  This was all discussed in full with complete understanding.    Thank you for allowing me to participate in the care of your patient.  Please do not hesitate with any questions or concerns.  I will keep you aware of any further encounters with Roc Rees.        Sincerely yours,         GALINA Jenkins

## 2022-05-06 NOTE — PROGRESS NOTES
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  male who was seen in the office previously on 3/9/2022 referred by Dr. Halley Loaiza for evaluation of iron deficiency. Jensen Chu donates blood regularly every 53 days. He was noted to have polycythemia on his initial visit, he takes testosterone replacement with a topical testosterone roll-on daily. He also has underlying COPD with history of tobacco use and abuse. Work-up in review of his initial visit revealed that his iron deficiency was most likely related to his recurrent blood donations. Since he has polycythemia most likely related to testosterone replacement and COPD, it was recommended that he continue with his blood donation. He does not have any headaches, visual disturbances, chest pain. He was not anemic from his iron deficiency, and he stopped his oral iron replacement. He also has significant COPD with history of tobacco abusesmoking 2 pack/day. He stopped smoking earlier this year, when questioned if he has not resumed smoking he says \"it is nothing to worry about\". He does use inhalers. He does have hypertension and is on losartan with combination HCTZ.       HEMATOLOGY HISTORY: Iron deficiency without anemia related to frequent blood donation, polycythemia secondary to testosterone/COPD  Jensen Chu was seen in initial hematology consultation on 3/9/2022 referred by Dr. Halley Loaiza for evaluation of iron deficiency.     Review of care everywhere reviewed [facility] [place] 4/9/2019 revealing a WBC 5.61, Hgb 15.5, MCV 86.1, MCHC 29.4, ,000.     Prior colonoscopy per Dr. Nila Hernadez 6/28/2019 revealed a 5 mm sessile polyp (tubular adenoma, negative for high-grade dysplasia) in the transverse colon, 7 mm sessile polyp  (tubular adenoma, negative for high-grade dysplasia) in the descending colon, diverticular disease in the left colon. Follow-up 5 years.     CBC 1/4/2022 revealed a WBC of 9.9 with 65.0% PMN, 20.4% lymphocytes, 10.4% monocyte, Hgb 15.8, MCV 77.2, MCHC 28.6, ,000.     Serology 1/4/2022  Serum Fe -  27  TIBC - 412  Fe sat - 7%  Ferritin - 11     FOBT x3 on 1/12/2022 were all NEGATIVE     He is taking 1 iron tablet daily.     He does have a history of elevated H/H and was told previously by [facility] doctor\" that his blood was too thick and he needed to donate blood. He reports that he has been donating blood regularly for the past 5 yearsmost recent was in January.     He is followed by Dr. Jp Miranda for hypogonadism and is on testosterone replacement. He also has significant COPD with history of tobacco abusesmoking 2 pack/day and stopped smoking 2 months ago. He does use inhalers.   He does have hypertension and is on losartan with combination HCTZ.         Past Medical History:   Diagnosis Date    History of blood transfusion     Hypertension         Past Surgical History:   Procedure Laterality Date    COLONOSCOPY      COLONOSCOPY N/A 6/28/2019    Dr Lily Calvert-Diverticular disease-Tubular AP (-) dysplasia x 2    JOINT REPLACEMENT Right     shoulder    LEG AMPUTATION BELOW KNEE Right 1986    SKIN GRAFT Right     leg           Current Outpatient Medications:     albuterol sulfate  (90 Base) MCG/ACT inhaler, as needed, Disp: , Rfl:     BREZTRI AEROSPHERE 160-9-4.8 MCG/ACT AERO, daily, Disp: , Rfl:     aspirin 81 MG EC tablet, Take 81 mg by mouth daily, Disp: , Rfl:     hydroCHLOROthiazide (MICROZIDE) 12.5 MG capsule, Take 12.5 mg by mouth every morning, Disp: , Rfl:     LOSARTAN POTASSIUM-HCTZ PO, Take by mouth daily, Disp: , Rfl:      No Known Allergies    Social History     Tobacco Use    Smoking status: Former Smoker     Types: Cigarettes     Quit date: 2021     Years since quittin.1    Smokeless tobacco: Never Used   Vaping Use    Vaping Use: Never used   Substance Use Topics    Alcohol use: Never    Drug use: Never       Family History   Problem Relation Age of Onset    Kidney Cancer Father     Colon Cancer Neg Hx     Esophageal Cancer Neg Hx     Liver Cancer Neg Hx     Rectal Cancer Neg Hx     Stomach Cancer Neg Hx        Subjective   REVIEW OF SYSTEMS:   Review of Systems   Constitutional: Negative for chills, diaphoresis, fatigue, fever and unexpected weight change. HENT: Negative for mouth sores, nosebleeds, sore throat, trouble swallowing and voice change. Eyes: Negative for photophobia, discharge and itching. Respiratory: Positive for shortness of breath (Chronic and unchanged). Negative for cough and wheezing. Cardiovascular: Negative for chest pain, palpitations and leg swelling. Gastrointestinal: Negative for abdominal distention, abdominal pain, blood in stool, constipation, diarrhea, nausea and vomiting. Endocrine: Negative for cold intolerance, heat intolerance, polydipsia and polyuria. Genitourinary: Negative for difficulty urinating, dysuria, hematuria and urgency. Musculoskeletal: Negative for arthralgias, back pain, joint swelling and myalgias. Skin: Negative for color change and rash. Neurological: Negative for dizziness, tremors, seizures, syncope and light-headedness. Hematological: Negative for adenopathy. Does not bruise/bleed easily. Psychiatric/Behavioral: Negative for behavioral problems and suicidal ideas. The patient is not nervous/anxious. All other systems reviewed and are negative.       Objective   BP (!) 140/90   Pulse 75   Ht 5' 10\" (1.778 m)   Wt 247 lb (112 kg)   SpO2 95%   BMI 35.44 kg/m²     PHYSICAL EXAM:  Physical Exam  Vitals reviewed. Constitutional:       General: He is not in acute distress. Appearance: He is well-developed. HENT:      Head: Normocephalic and atraumatic. Nose: Nose normal.   Eyes:      General: No scleral icterus. Conjunctiva/sclera: Conjunctivae normal.   Neck:      Vascular: No JVD. Trachea: No tracheal deviation. Cardiovascular:      Rate and Rhythm: Normal rate and regular rhythm. Heart sounds: Normal heart sounds. No murmur heard. Pulmonary:      Effort: Pulmonary effort is normal. No respiratory distress. Breath sounds: Normal breath sounds. No wheezing. Abdominal:      General: Bowel sounds are normal. There is no distension. Palpations: Abdomen is soft. There is no mass. Tenderness: There is no abdominal tenderness. Hernia: A hernia is present. Hernia is present in the ventral area. Musculoskeletal:         General: No tenderness or deformity. Normal range of motion. Skin:     Findings: No erythema or rash. Neurological:      Mental Status: He is alert and oriented to person, place, and time. Psychiatric:         Thought Content: Thought content normal.     CBC reviewed by me  Lab Results   Component Value Date    WBC 6.71 05/09/2022    HGB 16.3 05/09/2022    HCT 55.2 (HH) 05/09/2022    MCV 78.3 (L) 05/09/2022     (H) 05/09/2022     Lab Results   Component Value Date    NEUTROABS 4.36 05/09/2022       VISIT DIAGNOSES  1. Iron deficiency    2. Polycythemia        ASSESSMENT/PLAN:    1. Microcytosis -iron deficiency     Review of care everywhere reviewed [facility] 4/9/2019 revealing a WBC 5.61, Hgb 15.5, MCV 86.1, MCHC 29.4, ,000.     Prior colonoscopy per Dr. [facility] 6/28/2019 revealed a 5 mm sessile polyp (tubular adenoma, negative for high-grade dysplasia) in the transverse colon, 7 mm sessile polyp  (tubular adenoma, negative for high-grade dysplasia) in the descending colon, diverticular disease in the left colon.   Follow-up 5 years.     CBC 1/4/2022 revealed a WBC of 9.9 with 65.0% PMN, 20.4% lymphocytes, 10.4% monocyte, Hgb 15.8, MCV 77.2, MCHC 28.6, ,000.        Serology 1/4/2022  Serum Fe -  27  TIBC - 412  Fe sat - 7%  Ferritin - 11     FOBT x3 on 1/12/2022 were all NEGATIVE     He is taking 1 iron tablet daily.     Urinalysis 2/8/2022 at [facility] revealed trace bloodhe is followed by Dr. Armond Petersen.        He does have iron deficiency without anemia. His iron deficiency appears to be secondary to his frequent visits to [facility] for blood donation. He has not developed anemia, and can be monitored with periodic CBCs and if he develops anemia iron replacement could be given at that point. 2.  Polycythemia.     Serology 5/9/2022  SRIRAM 2 - [id number] negative  CALR mutation - negative   SRIRAM 2 Exons 12-15 - negative  MPL - negative  Epo - 18 (WNL)        Polycythemia slightly secondary to testosterone replacement, COPD. Hematocrit today is 55.2. He will be going for his routine blood donation in a week. I feel the goal with him would be to keep his hematocrit below 60.       3.  Mild thrombocytosis          JAK2 with reflex was negative. His platelet count today is 365,000just mild thrombocytosis. His thrombocytosis is felt to be reactive due to his iron deficiency from frequent blood donation. Oral iron replacement can be given if he develops anemia or his platelet count were to rise above 600,000. Elma Nguyen would prefer to have his CBCs followed up with Dr. Elsa Stratton and he is going to continue his routine blood donation. He would prefer to follow-up in our office as needed.        HEALTH MAINTENANCE     · Prostate cancer screening. Screening PSA 1/28/2022 was 1.060     · Colon cancer screening.   Colonoscopy per Dr. Omar Nageotte 6/28/2019 revealed a 5 mm sessile polyp (tubular adenoma, negative for high-grade dysplasia) in the transverse colon, 7 mm sessile polyp  (tubular adenoma, negative for high-grade dysplasia) in the descending colon, diverticular disease in the left colon. Follow-up 5 years. Orders Placed This Encounter   Procedures    CBC with Auto Differential    Iron and TIBC    Ferritin        Return if symptoms worsen or fail to improve, for With [facility].      Doretha Hinojosa PA-C  1:29 PM  5/9/2022

## 2022-05-09 ENCOUNTER — OFFICE VISIT (OUTPATIENT)
Dept: HEMATOLOGY | Age: 67
End: 2022-05-09
Payer: COMMERCIAL

## 2022-05-09 ENCOUNTER — HOSPITAL ENCOUNTER (OUTPATIENT)
Dept: INFUSION THERAPY | Age: 67
Discharge: HOME | End: 2022-05-09
Payer: COMMERCIAL

## 2022-05-09 VITALS
HEART RATE: 75 BPM | HEIGHT: 70 IN | OXYGEN SATURATION: 95 % | BODY MASS INDEX: 35.36 KG/M2 | DIASTOLIC BLOOD PRESSURE: 90 MMHG | SYSTOLIC BLOOD PRESSURE: 140 MMHG | WEIGHT: 247 LBS

## 2022-05-09 DIAGNOSIS — K63.5 COLORECTAL POLYPS: ICD-10-CM

## 2022-05-09 DIAGNOSIS — D75.1 POLYCYTHEMIA: ICD-10-CM

## 2022-05-09 DIAGNOSIS — E29.1 HYPOGONADISM, MALE: ICD-10-CM

## 2022-05-09 DIAGNOSIS — K62.1 COLORECTAL POLYPS: ICD-10-CM

## 2022-05-09 DIAGNOSIS — E61.1 IRON DEFICIENCY: ICD-10-CM

## 2022-05-09 DIAGNOSIS — E61.1 IRON DEFICIENCY: Primary | ICD-10-CM

## 2022-05-09 LAB
FERRITIN: 9.2 NG/ML (ref 30–400)
HCT VFR BLD CALC: 55.2 % (ref 40.1–51)
HEMOGLOBIN: 16.3 G/DL (ref 13.7–17.5)
IRON SATURATION: 10 % (ref 14–50)
IRON: 42 UG/DL (ref 59–158)
LYMPHOCYTES ABSOLUTE: 1.33 K/UL (ref 1.18–3.74)
LYMPHOCYTES RELATIVE PERCENT: 19.8 % (ref 19.3–53.1)
MCH RBC QN AUTO: 23.1 PG (ref 25.7–32.2)
MCHC RBC AUTO-ENTMCNC: 29.5 G/DL (ref 32.3–36.5)
MCV RBC AUTO: 78.3 FL (ref 79–92.2)
MONOCYTES ABSOLUTE: 0.64 K/UL (ref 0.24–0.82)
MONOCYTES RELATIVE PERCENT: 9.5 % (ref 4.7–12.5)
NEUTROPHILS ABSOLUTE: 4.36 K/UL (ref 1.56–6.13)
NEUTROPHILS RELATIVE PERCENT: 65 % (ref 34–71.1)
PDW BLD-RTO: 18 % (ref 11.6–14.4)
PLATELET # BLD: 365 K/UL (ref 163–337)
PMV BLD AUTO: 8.9 FL (ref 7.4–10.4)
RBC # BLD: 7.05 M/UL (ref 4.63–6.08)
TOTAL IRON BINDING CAPACITY: 411 UG/DL (ref 250–400)
WBC # BLD: 6.71 K/UL (ref 4.23–9.07)

## 2022-05-09 PROCEDURE — 85025 COMPLETE CBC W/AUTO DIFF WBC: CPT

## 2022-05-09 PROCEDURE — 99213 OFFICE O/P EST LOW 20 MIN: CPT | Performed by: PHYSICIAN ASSISTANT

## 2022-05-09 PROCEDURE — 99212 OFFICE O/P EST SF 10 MIN: CPT

## 2022-05-09 RX ORDER — TESTOSTERONE 30 MG/1.5ML
SOLUTION TOPICAL
Qty: 90 ML | Refills: 5 | Status: SHIPPED | OUTPATIENT
Start: 2022-05-09 | End: 2022-12-29 | Stop reason: SDUPTHER

## 2022-05-09 ASSESSMENT — ENCOUNTER SYMPTOMS
BACK PAIN: 0
TROUBLE SWALLOWING: 0
VOICE CHANGE: 0
EYE DISCHARGE: 0
WHEEZING: 0
VOMITING: 0
SORE THROAT: 0
BLOOD IN STOOL: 0
DIARRHEA: 0
ABDOMINAL DISTENTION: 0
EYE ITCHING: 0
PHOTOPHOBIA: 0
SHORTNESS OF BREATH: 1
NAUSEA: 0
CONSTIPATION: 0
COLOR CHANGE: 0
ABDOMINAL PAIN: 0
COUGH: 0

## 2022-05-31 RX ORDER — BUDESONIDE, GLYCOPYRROLATE, AND FORMOTEROL FUMARATE 160; 9; 4.8 UG/1; UG/1; UG/1
AEROSOL, METERED RESPIRATORY (INHALATION)
Qty: 10.7 G | Refills: 11 | Status: SHIPPED | OUTPATIENT
Start: 2022-05-31

## 2022-05-31 NOTE — TELEPHONE ENCOUNTER
Rx Refill Note  Requested Prescriptions     Pending Prescriptions Disp Refills   • Breztri Aerosphere 160-9-4.8 MCG/ACT aerosol inhaler [Pharmacy Med Name: BREZTRI AEROSPHERE 160MCG/ACT-4.8MCG/ACT-9MCG/ACT AEROSOL] 10.7 g 10     Sig: INHALE 2 PUFFS TWICE DAILY.      Last office visit with prescribing clinician: 5/17/2021      Next office visit with prescribing clinician: Visit date not found            Brandy Holm CMA  05/31/22, 10:55 CDT

## 2022-06-03 ENCOUNTER — HOSPITAL ENCOUNTER (OUTPATIENT)
Dept: CT IMAGING | Facility: HOSPITAL | Age: 67
Discharge: HOME | End: 2022-06-03
Admitting: NURSE PRACTITIONER

## 2022-06-03 ENCOUNTER — DOCUMENTATION (OUTPATIENT)
Dept: CT IMAGING | Facility: HOSPITAL | Age: 67
End: 2022-06-03

## 2022-06-03 DIAGNOSIS — Z87.891 PERSONAL HISTORY OF NICOTINE DEPENDENCE: Chronic | ICD-10-CM

## 2022-06-03 PROCEDURE — 71271 CT THORAX LUNG CANCER SCR C-: CPT

## 2022-06-06 NOTE — PROGRESS NOTES
Bernadette Sparks, Ajay Ramey, DAQUAN  Please let him know his CT is unchanged. Keep f/u as scheduled         Spoke with patient and relayed test results. Patient voiced understanding.

## 2022-08-01 ENCOUNTER — LAB (OUTPATIENT)
Dept: UROLOGY | Facility: CLINIC | Age: 67
End: 2022-08-01

## 2022-08-01 DIAGNOSIS — E29.1 HYPOGONADISM IN MALE: ICD-10-CM

## 2022-08-01 NOTE — PROGRESS NOTES
"Chief Complaint  COPD    Subjective    History of Present Illness     Roc Rees presents to [facility] for:    Management of COPD, lung scarring and lung nodule.  His alpha-1 testing is normal. Most recent FEV1 was 48.  He is a former smoker.  Low-dose lung cancer imaging due June 2022.  He has had a right upper lobe 4 mm nodule present since 2007.  Occupational history positive for coal mining.  He was a  for approximately 10 years.  He was involved in an accident while mining and lost his right leg below the knee.  He is obese.  He is typically followed by Dr. Morrison.  He was previously on Breo however switched to Breztri for worsening complaints with good benefit.  He feels that is still beneficial.  He does not need refills.  He is not using his rescue inhaler excessively.  He did \"catch a cold\" from his grandson a few months back.  He feels he recovered from that without issue.  He is on CPAP for sleep apnea.  He is compliant with this and benefiting from it.       Prior to Admission medications    Medication Sig Start Date End Date Taking? Authorizing Provider   albuterol sulfate  (90 Base) MCG/ACT inhaler Inhale 2 puffs Every 4 (Four) Hours As Needed for Wheezing or Shortness of Air. 5/17/21   Roc Morrison MD   aspirin 81 MG EC tablet Take 81 mg by mouth Daily.    ProviderGwendolyn MD Breztri Aerosphere 160-9-4.8 MCG/ACT aerosol inhaler INHALE 2 PUFFS TWICE DAILY. 5/31/22   Roc Morrison MD   hydroCHLOROthiazide (MICROZIDE) 12.5 MG capsule Take 12.5 mg by mouth Every Morning. 6/26/21   Gwendolyn La MD   losartan (COZAAR) 100 MG tablet Take 100 mg by mouth Daily. 7/23/21   Gwendolyn La MD   Testosterone (Axiron) 30 MG/ACT solution Apply to underarms daily 5/9/22   Howie Sosa MD   triamcinolone (KENALOG) 0.1 % ointment APPLY TWICE A DAY TO EARS AND NECK FOR 2 WEEKS AT A TIME. 10/30/20   " "Provider, MD Gwendolyn   vardenafil (LEVITRA) 20 MG tablet TAKE 1 TABLET BY MOUTH AS NEEDED FOR ERECTILE DYSFUNCTION 2/9/22   Howie Sosa MD       Social History     Socioeconomic History   • Marital status:    Tobacco Use   • Smoking status: Former Smoker     Packs/day: 1.00     Years: 38.00     Pack years: 38.00   • Smokeless tobacco: Never Used   • Tobacco comment: occassionally smokes trying to quit as of 4/16/2019   Vaping Use   • Vaping Use: Never used   Substance and Sexual Activity   • Alcohol use: No   • Drug use: No   • Sexual activity: Defer       Objective   Vital Signs:   /84   Pulse 77   Ht 175.3 cm (69\")   Wt 109 kg (241 lb)   SpO2 94% Comment: RA  BMI 35.59 kg/m²     Physical Exam  Constitutional:       Appearance: He is obese.      Interventions: Face mask in place.   HENT:      Head:      Comments: Hearing aids  Cardiovascular:      Rate and Rhythm: Normal rate and regular rhythm.      Heart sounds: No murmur heard.  Pulmonary:      Effort: Pulmonary effort is normal.      Breath sounds: Normal breath sounds.   Musculoskeletal:      Right lower leg: No edema.      Left lower leg: No edema.      Comments: Right lower extremity prosthesis   Neurological:      Mental Status: He is alert and oriented to person, place, and time.        Result Review :    PFT Values        Some values may be hidden. Unless noted otherwise, only the newest values recorded on each date are displayed.         Old Values PFT Results 8/22/22   No data to display.      Pre Drug PFT Results 8/22/22   FVC 64   FEV1 35   FEF 25-75% 11   FEV1/FVC 43      Post Drug PFT Results 8/22/22   No data to display.      Other Tests PFT Results 8/22/22   DLCO 84   D/VAsb 105           Results for orders placed in visit on 08/22/22    Pulmonary Function Test    Narrative  Pulmonary Function Test  Performed by: Brandy Ruth, RRT  Authorized by: Bernadette Sparks APRN    Pre Drug % Predicted  FVC: " 64%  FEV1: 35%  FEF 25-75%: 11%  FEV1/FVC: 43%  DLCO: 84%  D/VAsb: 105%    Interpretation  Spirometry  Spirometry shows severe obstruction. There is reduced midflow suggesting small airway/airflow obstruction.  Review of FVL curve  There is a flattening of the expiratory curve, suggesting variable intrathoracic obstruction.  Diffusion Capacity  The patient's diffusion capacity is normal.  Diffusion capacity is normal when corrected for alveolar volume.      Results for orders placed during the hospital encounter of 09/11/19    Full Pulmonary Function Test With Bronchodilator    Narrative  [facility] - Pulmonary Function Test    [address] [facility]  [place]  [address]  [phone]    Patient : Roc Rees  MRN : [id number]  CSN : [id number]  Pulmonologist : Marv Graves MD  Date : 9/12/2019    ______________________________________________________________________    Interpretation :  1.  Spirometry is consistent with a severe obstructive ventilatory defect.  2.  Lung volumes reveal hyperinflation.  There is also a mild decrease in vital capacity second to obstruction and a mild decrease in inspiratory capacity.  3.  Diffusion capacity is within normal limits particularly when corrected for alveolar volume.      Marv Graves MD      My interpretation of imaging:  None for this visit    My interpretation of labs: none for this visit      Assessment and Plan     Diagnoses and all orders for this visit:    1. Encounter for preoperative screening laboratory testing for COVID-19 virus (Primary)  -     COVID PRE-OP / PRE-PROCEDURE SCREENING ORDER (NO ISOLATION) - Swab, Nasal Cavity; Future    2. COPD, group C, by GOLD 2017 classification ([facility])  Comments:  PFTs fairly stable.  He is on Breztri.  No refills needed.  Not using albuterol excessively  Orders:  -     Pulmonary Function Test    3. Class 2 severe obesity due to excess calories with serious comorbidity and body mass index (BMI) of 37.0 to  37.9 in adult (HCC)  Comments:  Contributes to shortness of breath.  Educational material provided    4. Scarring of lung  Comments:  Repeat CT due June 2023.  PFTs stable    5. Lung nodule  Comments:  Repeat CT due June 2023.  PFTs stable    6. Obstructive sleep apnea  Comments:  Continue CPAP.  He is compliant with this and benefiting from it    7. Personal history of nicotine dependence  Comments:  Continue to avoid smoking.  Low-dose lung cancer imaging due June 2023    8. Occupational exposure to coal dust  Comments:  Repeat CT due June 2023.  PFTs stable    9. Encounter for immunization  Comments:  Has not had Prevnar 20.  Vaccine provided today.  No unwanted side effects  Orders:  -     Pneumococcal Conjugate Vaccine 20-Valent (PCV20)      Body mass index is 35.59 kg/m². Educational material provided after visit summary about elevated BMI        Bernadette Sparks, GALINA  8/22/2022  11:26 CDT    Follow Up   Return in about 6 months (around 2/22/2023).    Patient was given instructions and counseling regarding his condition or for health maintenance advice. Please see specific information pulled into the AVS if appropriate.

## 2022-08-02 LAB
HCT VFR BLD AUTO: 50.7 % (ref 37.5–51)
HGB BLD-MCNC: 15.9 G/DL (ref 13–17.7)
TESTOST SERPL-MCNC: 906 NG/DL (ref 264–916)

## 2022-08-03 ENCOUNTER — TELEPHONE (OUTPATIENT)
Dept: UROLOGY | Facility: CLINIC | Age: 67
End: 2022-08-03

## 2022-08-03 NOTE — TELEPHONE ENCOUNTER
Spoke with Patient to remind patient to get imaging one hour prior to their appointment and lab work done. Patient voiced understanding.

## 2022-08-03 NOTE — PROGRESS NOTES
Subjective    Mr. Rees is 66 y.o. male    Chief Complaint: 6 months Follow up for testosterone/kidney stone.    History of Present Illness  Patient is a 66-year-old gentleman who presents for 6-month follow-up he has history of hypergonadism as well as erectile dysfunction he is currently on Axiron solution.  He also takes Levitra for erectile dysfunction.  He is having good symptom improvement on testosterone replacement his hemoglobin hematocrit are now within normal range.  He does donate blood periodically.  His testosterone was 906 which is still within normal range but on high end of normal.  He had a PSA done January 2022 which was 1.06.  He was asking about any other medications for erectile dysfunction he thinks he has tried Viagra in the past but not sure he had tried Cialis.    Also with history of kidney stones he got a KUB prior to his appointment today.  KUB shows a stable stone in the right kidney approximately 19 x 10 mm.  No other obvious calcifications or stones are seen.  His urine is clear with a pH of 7.0.    The following portions of the patient's history were reviewed and updated as appropriate: allergies, current medications, past family history, past medical history, past social history, past surgical history and problem list.    Review of Systems   Genitourinary: Negative.    Musculoskeletal: Negative.          Current Outpatient Medications:   •  albuterol sulfate  (90 Base) MCG/ACT inhaler, Inhale 2 puffs Every 4 (Four) Hours As Needed for Wheezing or Shortness of Air., Disp: 18 g, Rfl: 11  •  aspirin 81 MG EC tablet, Take 81 mg by mouth Daily., Disp: , Rfl:   •  Breztri Aerosphere 160-9-4.8 MCG/ACT aerosol inhaler, INHALE 2 PUFFS TWICE DAILY., Disp: 10.7 g, Rfl: 11  •  hydroCHLOROthiazide (MICROZIDE) 12.5 MG capsule, Take 12.5 mg by mouth Every Morning., Disp: , Rfl:   •  losartan (COZAAR) 100 MG tablet, Take 100 mg by mouth Daily., Disp: , Rfl:   •  Testosterone (Axiron) 30 MG/ACT  solution, Apply to underarms daily, Disp: 90 mL, Rfl: 5  •  triamcinolone (KENALOG) 0.1 % ointment, APPLY TWICE A DAY TO EARS AND NECK FOR 2 WEEKS AT A TIME., Disp: , Rfl:   •  vardenafil (LEVITRA) 20 MG tablet, TAKE 1 TABLET BY MOUTH AS NEEDED FOR ERECTILE DYSFUNCTION, Disp: 10 tablet, Rfl: 11  •  tadalafil (Cialis) 20 MG tablet, Take 1 tablet by mouth As Needed for Erectile Dysfunction for up to 5 doses., Disp: 5 tablet, Rfl: 0  •  vardenafil (Levitra) 20 MG tablet, Take 1 tablet by mouth As Needed for Erectile Dysfunction., Disp: 20 tablet, Rfl: 1    Past Medical History:   Diagnosis Date   • Class 2 severe obesity due to excess calories with serious comorbidity and body mass index (BMI) of 37.0 to 37.9 in adult ([facility]) 8/10/2021   • COPD, group C, by GOLD 2017 classification ([facility]) 8/10/2021   • History of transfusion 1986   • Hypertension    • Lung nodule 8/10/2021    Right upper lobe, 2007, 4 mm   • Nephrolithiasis    • Obstructive sleep apnea 2/21/2022   • Occupational exposure to coal dust 8/10/2021   • Personal history of nicotine dependence 8/10/2021   • Scarring of lung 8/10/2021   • Shoulder pain    • SOB (shortness of breath)        Past Surgical History:   Procedure Laterality Date   • BELOW KNEE LEG AMPUTATION Right    • LYMPH NODE BIOPSY     • TOTAL SHOULDER ARTHROPLASTY W/ DISTAL CLAVICLE EXCISION Right 1/22/2019    Procedure: RIGHT  REVERSE TOTAL SHOULDER  ARTHROPLASTY;  Surgeon: Donnie Frias MD;  Location:  [facility] OR;  Service: Orthopedics   • URETEROSCOPY LASER LITHOTRIPSY WITH STENT INSERTION Left 4/25/2019    Procedure: CYSTOSCOPY LEFT URETERAL BALLOON DILATION AND URETEROSCOPY LASER LITHOTRIPSY WITH LEFT STENT INSERTION;  Surgeon: Tyrel Hilario MD;  Location:  [facility] OR;  Service: Urology   • VEIN SURGERY Left 10/12/2020    Procedure: LEFT SAPHENOUS VEIN CLOSURE using Venaseal;  Surgeon: Edgardo Vargas DO;  Location:  [facility] HYBRID OR 12;  Service: Vascular;  Laterality: Left;  "      Social History     Socioeconomic History   • Marital status:    Tobacco Use   • Smoking status: Current Some Day Smoker     Packs/day: 1.00     Years: 35.00     Pack years: 35.00   • Smokeless tobacco: Never Used   • Tobacco comment: occassionally smokes trying to quit as of 4/16/2019   Vaping Use   • Vaping Use: Never used   Substance and Sexual Activity   • Alcohol use: No   • Drug use: No   • Sexual activity: Defer       Family History   Problem Relation Age of Onset   • Kidney cancer Father    • No Known Problems Mother        Objective    Temp 98.1 °F (36.7 °C)   Ht 177.8 cm (70\")   Wt 109 kg (240 lb 3.2 oz)   BMI 34.47 kg/m²     Physical Exam  Vitals reviewed.   Constitutional:       General: He is not in acute distress.     Appearance: Normal appearance.   HENT:      Head: Normocephalic and atraumatic.      Nose: No congestion.   Pulmonary:      Effort: Pulmonary effort is normal.   Skin:     Coloration: Skin is not pale.   Neurological:      Mental Status: He is alert and oriented to person, place, and time.   Psychiatric:         Mood and Affect: Mood normal.         Behavior: Behavior normal.             Results for orders placed or performed in visit on 08/08/22   POC Urinalysis Dipstick, Multipro    Specimen: Urine   Result Value Ref Range    Color Yellow Yellow, Straw, Dark Yellow, Linda    Clarity, UA Clear Clear    Glucose, UA Negative Negative mg/dL    Bilirubin Negative Negative    Ketones, UA Negative Negative    Specific Gravity  1.015 1.005 - 1.030    Blood, UA Negative Negative    pH, Urine 7.0 5.0 - 8.0    Protein, POC Negative Negative mg/dL    Urobilinogen, UA Normal Normal    Nitrite, UA Negative Negative    Leukocytes Trace (A) Negative   IPSS Questionnaire (AUA-7):  Incomplete emptying  Over the past month, how often have you had a sensation of not emptying your bladder completely after you finish?: Less than half the time (08/08/22 0959)  Frequency  Over the past month, " how often have you had to urinate again less than two hours after you finishing urinating ?: Less than half the time (08/08/22 0959)  Intermittency  Over the past month, how often have you found you stopped and started again several time when you urinated ?: About half the time (08/08/22 0959)  Urgency  Over the last month, how difficult  have you found it to postpone urination ?: Less than half the time (08/08/22 0959)  Weak Stream  Over the past month, how often have you had a weak urinary stream ?: Almost always (08/08/22 0959)  Straining  Over the past month, how often have you had to push or strain to begin urination ?: Almost always (08/08/22 0959)  Nocturia  Over the past month, how many times did you most typically get up to urinate from the time you went to bed until the time you got up in the morning ?: Less than half the time (08/08/22 0959)  Quality of life due to urinary symptoms  If you were to spend the rest of your life with your urinary condition the way it is now, how would feel about that?: Mixed - about equally satisfied (08/08/22 0959)    Scores  Total IPSS Score: 21 (08/08/22 0959)  Total Score = Symtomatic Level: severely symptomatic: 20-35 (08/08/22 0959)     KUB independent review    A KUB is available for me to review today.  The image is inspected for a bowel gas pattern and the general bone structure of the spine and pelvis. The kidneys are then inspected closely.  Renal outline is noted if identifiable. The kidney, collecting system, and anticipated path of the ureter are examined for calcifications including those in the true pelvis.  This film reveals:    On the right there is a single renal stone measuring 19 x 11 mm.    On the left there are no calcificaitons seen in the kidney or the expected course of the ureter. .      Assessment and Plan    Diagnoses and all orders for this visit:    1. Right kidney stone (Primary)  -     POC Urinalysis Dipstick, Multipro    2. Erectile  dysfunction, unspecified erectile dysfunction type  -     vardenafil (Levitra) 20 MG tablet; Take 1 tablet by mouth As Needed for Erectile Dysfunction.  Dispense: 20 tablet; Refill: 1  -     tadalafil (Cialis) 20 MG tablet; Take 1 tablet by mouth As Needed for Erectile Dysfunction for up to 5 doses.  Dispense: 5 tablet; Refill: 0    3. Hypogonadism in male  -     Testosterone; Future  -     PSA DIAGNOSTIC; Future  -     Hemoglobin & Hematocrit, Blood; Future      Regarding his erectile dysfunction he was asking about other medications he had tried Viagra in the past and thought the Levitra worked better he has never tried Cialis I gave him prescription for 5 doses of Cialis 20 mg to try if he has better response he will contact us we will call in a prescription for Cialis otherwise he will continue with his Levitra.        Regarding his testosterone hemoglobin accurate with in normal range PSA done January was 1.06 we will get PSA in 6 months as well as testosterone hemoglobin and hematocrit.  He does not need any refills on testosterone at this time.    Regarding his KUB stable large stone in the right kidney no other new stones are seen.  Continue to monitor would not get a KUB in 6 months KUB 1 year.

## 2022-08-08 ENCOUNTER — HOSPITAL ENCOUNTER (OUTPATIENT)
Dept: GENERAL RADIOLOGY | Facility: HOSPITAL | Age: 67
Discharge: HOME | End: 2022-08-08
Admitting: PHYSICIAN ASSISTANT

## 2022-08-08 ENCOUNTER — TELEPHONE (OUTPATIENT)
Dept: UROLOGY | Facility: CLINIC | Age: 67
End: 2022-08-08

## 2022-08-08 ENCOUNTER — OFFICE VISIT (OUTPATIENT)
Dept: UROLOGY | Facility: CLINIC | Age: 67
End: 2022-08-08

## 2022-08-08 VITALS — TEMPERATURE: 98.1 F | HEIGHT: 70 IN | WEIGHT: 240.2 LBS | BODY MASS INDEX: 34.39 KG/M2

## 2022-08-08 DIAGNOSIS — E29.1 HYPOGONADISM IN MALE: ICD-10-CM

## 2022-08-08 DIAGNOSIS — N20.0 RIGHT KIDNEY STONE: ICD-10-CM

## 2022-08-08 DIAGNOSIS — N20.0 RIGHT KIDNEY STONE: Primary | ICD-10-CM

## 2022-08-08 DIAGNOSIS — N52.9 ERECTILE DYSFUNCTION, UNSPECIFIED ERECTILE DYSFUNCTION TYPE: ICD-10-CM

## 2022-08-08 LAB
BILIRUB BLD-MCNC: NEGATIVE MG/DL
CLARITY, POC: CLEAR
COLOR UR: YELLOW
GLUCOSE UR STRIP-MCNC: NEGATIVE MG/DL
KETONES UR QL: NEGATIVE
LEUKOCYTE EST, POC: ABNORMAL
NITRITE UR-MCNC: NEGATIVE MG/ML
PH UR: 7 [PH] (ref 5–8)
PROT UR STRIP-MCNC: NEGATIVE MG/DL
RBC # UR STRIP: NEGATIVE /UL
SP GR UR: 1.01 (ref 1–1.03)
UROBILINOGEN UR QL: NORMAL

## 2022-08-08 PROCEDURE — 99214 OFFICE O/P EST MOD 30 MIN: CPT | Performed by: PHYSICIAN ASSISTANT

## 2022-08-08 PROCEDURE — 74018 RADEX ABDOMEN 1 VIEW: CPT

## 2022-08-08 PROCEDURE — 81001 URINALYSIS AUTO W/SCOPE: CPT | Performed by: PHYSICIAN ASSISTANT

## 2022-08-08 RX ORDER — TADALAFIL 20 MG/1
20 TABLET ORAL AS NEEDED
Qty: 5 TABLET | Refills: 0 | Status: SHIPPED | OUTPATIENT
Start: 2022-08-08 | End: 2023-03-30

## 2022-08-08 RX ORDER — VARDENAFIL HYDROCHLORIDE 20 MG/1
20 TABLET ORAL AS NEEDED
Qty: 20 TABLET | Refills: 1 | Status: SHIPPED | OUTPATIENT
Start: 2022-08-08 | End: 2022-08-22 | Stop reason: SDUPTHER

## 2022-08-08 NOTE — TELEPHONE ENCOUNTER
I [facility] for the pt and if needed to call us back with the number----- Message from ALPHONSO Cazares sent at 8/8/2022  3:39 PM CDT -----  Regarding: KUB  Let patient know although we did not discuss during his office visit that I did review his KUB that shows the stone in the right kidney it is a large stone but it has been stable and remains stable from previous imaging.  A smaller stone also seen is not surgical stone at this time.  ----- Message -----  From: David Rad Results [place] [facility]  Sent: 8/8/2022   3:23 PM CDT  To: ALPHONSO Cazares

## 2022-08-19 ENCOUNTER — LAB (OUTPATIENT)
Dept: LAB | Facility: HOSPITAL | Age: 67
End: 2022-08-19

## 2022-08-19 DIAGNOSIS — Z01.818 PREOP TESTING: Primary | ICD-10-CM

## 2022-08-19 LAB — SARS-COV-2 ORF1AB RESP QL NAA+PROBE: NOT DETECTED

## 2022-08-19 PROCEDURE — C9803 HOPD COVID-19 SPEC COLLECT: HCPCS

## 2022-08-19 PROCEDURE — U0004 COV-19 TEST NON-CDC HGH THRU: HCPCS

## 2022-08-22 ENCOUNTER — PROCEDURE VISIT (OUTPATIENT)
Dept: PULMONOLOGY | Facility: CLINIC | Age: 67
End: 2022-08-22

## 2022-08-22 ENCOUNTER — OFFICE VISIT (OUTPATIENT)
Dept: PULMONOLOGY | Facility: CLINIC | Age: 67
End: 2022-08-22

## 2022-08-22 VITALS
WEIGHT: 241 LBS | HEIGHT: 69 IN | DIASTOLIC BLOOD PRESSURE: 84 MMHG | OXYGEN SATURATION: 94 % | SYSTOLIC BLOOD PRESSURE: 142 MMHG | BODY MASS INDEX: 35.7 KG/M2 | HEART RATE: 77 BPM

## 2022-08-22 DIAGNOSIS — Z87.891 PERSONAL HISTORY OF NICOTINE DEPENDENCE: Chronic | ICD-10-CM

## 2022-08-22 DIAGNOSIS — J44.9 COPD, GROUP C, BY GOLD 2017 CLASSIFICATION: Primary | ICD-10-CM

## 2022-08-22 DIAGNOSIS — Z01.812 ENCOUNTER FOR PREOPERATIVE SCREENING LABORATORY TESTING FOR COVID-19 VIRUS: Primary | ICD-10-CM

## 2022-08-22 DIAGNOSIS — Z20.822 ENCOUNTER FOR PREOPERATIVE SCREENING LABORATORY TESTING FOR COVID-19 VIRUS: Primary | ICD-10-CM

## 2022-08-22 DIAGNOSIS — E66.01 CLASS 2 SEVERE OBESITY DUE TO EXCESS CALORIES WITH SERIOUS COMORBIDITY AND BODY MASS INDEX (BMI) OF 37.0 TO 37.9 IN ADULT: Chronic | ICD-10-CM

## 2022-08-22 DIAGNOSIS — J98.4 SCARRING OF LUNG: Chronic | ICD-10-CM

## 2022-08-22 DIAGNOSIS — J44.9 COPD, GROUP C, BY GOLD 2017 CLASSIFICATION: Chronic | ICD-10-CM

## 2022-08-22 DIAGNOSIS — G47.33 OBSTRUCTIVE SLEEP APNEA: Chronic | ICD-10-CM

## 2022-08-22 DIAGNOSIS — Z23 ENCOUNTER FOR IMMUNIZATION: ICD-10-CM

## 2022-08-22 DIAGNOSIS — R91.1 LUNG NODULE: Chronic | ICD-10-CM

## 2022-08-22 DIAGNOSIS — Z57.2 OCCUPATIONAL EXPOSURE TO COAL DUST: Chronic | ICD-10-CM

## 2022-08-22 PROCEDURE — 94729 DIFFUSING CAPACITY: CPT | Performed by: NURSE PRACTITIONER

## 2022-08-22 PROCEDURE — 99214 OFFICE O/P EST MOD 30 MIN: CPT | Performed by: NURSE PRACTITIONER

## 2022-08-22 PROCEDURE — 90677 PCV20 VACCINE IM: CPT | Performed by: NURSE PRACTITIONER

## 2022-08-22 PROCEDURE — 94010 BREATHING CAPACITY TEST: CPT | Performed by: NURSE PRACTITIONER

## 2022-08-22 PROCEDURE — 90471 IMMUNIZATION ADMIN: CPT | Performed by: NURSE PRACTITIONER

## 2022-08-22 SDOH — HEALTH STABILITY - PHYSICAL HEALTH: OCCUPATIONAL EXPOSURE TO DUST: Z57.2

## 2022-08-22 NOTE — PROCEDURES
Pulmonary Function Test  Performed by: Brandy Ruth, RRT  Authorized by: Bernadette Sparks APRN      Pre Drug % Predicted    FVC: 64%   FEV1: 35%   FEF 25-75%: 11%   FEV1/FVC: 43%   DLCO: 84%   D/VAsb: 105%    Interpretation   Spirometry   Spirometry shows severe obstruction. There is reduced midflow suggesting small airway/airflow obstruction.   Review of FVL curve   There is a flattening of the expiratory curve, suggesting variable intrathoracic obstruction.   Diffusion Capacity  The patient's diffusion capacity is normal.  Diffusion capacity is normal when corrected for alveolar volume.

## 2022-09-26 ENCOUNTER — TELEPHONE (OUTPATIENT)
Dept: PULMONOLOGY | Facility: CLINIC | Age: 67
End: 2022-09-26

## 2022-09-26 DIAGNOSIS — R09.82 POSTNASAL DRIP: ICD-10-CM

## 2022-09-26 DIAGNOSIS — J44.1 COPD WITH ACUTE EXACERBATION: Primary | ICD-10-CM

## 2022-09-26 RX ORDER — PREDNISONE 10 MG/1
TABLET ORAL
Qty: 31 TABLET | Refills: 0 | Status: SHIPPED | OUTPATIENT
Start: 2022-09-26 | End: 2023-01-05

## 2022-09-26 RX ORDER — AZELASTINE 1 MG/ML
2 SPRAY, METERED NASAL 2 TIMES DAILY
Qty: 30 ML | Refills: 2 | Status: SHIPPED | OUTPATIENT
Start: 2022-09-26 | End: 2022-10-26

## 2022-09-26 NOTE — TELEPHONE ENCOUNTER
Patient states he has had increased shortness of breath in the last 1-2 weeks. He states he is coughing up clear sputum and chest congestion. Occasionally he will have a runny nose and has not had any fever or chills.   He is on Breztri and Albuterol HFA. He is not on any antihistamines, nasal sprays or mucinex.  He has not been on any antibiotics or prednisone recently. He is not aware of being around anyone that is sick.   Please advise.

## 2022-09-26 NOTE — TELEPHONE ENCOUNTER
I am going to send in a nasal spray and a steroid taper. Ok to use albuterol inhaler every 4 hours as needed for congestion as well. If no better let us know and we will send him for a CXR.

## 2022-10-05 ENCOUNTER — TELEPHONE (OUTPATIENT)
Dept: PODIATRY | Facility: CLINIC | Age: 67
End: 2022-10-05

## 2022-10-05 NOTE — TELEPHONE ENCOUNTER
CALLED PATIENT TO INFORM HIM THAT 1 YEAR FOLLOW UP APPOINTMENT WITH NID[facility] HAS BEEN MOVED UP DUE TO SPRING BREAK. NEW U/S APPOINTMENT IS 3/24/23 @ 12:30PM FOLLOWED UP BY 3:15PM APPOINTMENT WITH NIDHI.

## 2022-11-11 ENCOUNTER — TELEPHONE (OUTPATIENT)
Dept: PULMONOLOGY | Facility: CLINIC | Age: 67
End: 2022-11-11

## 2022-11-11 RX ORDER — PREDNISONE 10 MG/1
TABLET ORAL
Qty: 42 TABLET | Refills: 0 | Status: SHIPPED | OUTPATIENT
Start: 2022-11-11 | End: 2023-01-05

## 2022-11-11 NOTE — TELEPHONE ENCOUNTER
Patient called stating that he is having increased shortness of breath.  He was on a steroid taper in September and that seemed to help him.  He is using Breztri and albuterol, as well as nasal spray.  He is coughing up clear sputum.  He does not have any fever.  He would like the steroid taper sent to [facility].

## 2022-11-11 NOTE — TELEPHONE ENCOUNTER
Patient notified.    Per Dr. Friedman:  Please let him know I did call in the steroids to the [facility] pharmacy in [facility] as requested.  Thank you

## 2022-12-21 DIAGNOSIS — R09.82 POSTNASAL DRIP: Primary | ICD-10-CM

## 2022-12-21 RX ORDER — AZELASTINE 1 MG/ML
2 SPRAY, METERED NASAL 2 TIMES DAILY
Qty: 30 ML | Refills: 5 | Status: SHIPPED | OUTPATIENT
Start: 2022-12-21

## 2022-12-21 NOTE — TELEPHONE ENCOUNTER
Received a fax from the pharmacy requesting refills.    Rx Refill Note  Requested Prescriptions     Pending Prescriptions Disp Refills   • azelastine (ASTELIN) 0.1 % nasal spray 30 mL 5     Si sprays into the nostril(s) as directed by provider 2 (Two) Times a Day. Use in each nostril as directed      Last office visit with prescribing clinician: 2022   Last telemedicine visit with prescribing clinician: 2023   Next office visit with prescribing clinician: 2023                         Would you like a call back once the refill request has been completed: [] Yes [] No    If the office needs to give you a call back, can they leave a voicemail: [] Yes [] No    Rabia Ledezma MA  22, 10:13 CST

## 2022-12-27 ENCOUNTER — HOSPITAL ENCOUNTER (OUTPATIENT)
Dept: GENERAL RADIOLOGY | Facility: HOSPITAL | Age: 67
Discharge: HOME | End: 2022-12-27
Admitting: NURSE PRACTITIONER

## 2022-12-27 ENCOUNTER — TELEPHONE (OUTPATIENT)
Dept: PULMONOLOGY | Facility: CLINIC | Age: 67
End: 2022-12-27

## 2022-12-27 DIAGNOSIS — J44.9 COPD, GROUP C, BY GOLD 2017 CLASSIFICATION: ICD-10-CM

## 2022-12-27 DIAGNOSIS — J44.1 COPD WITH ACUTE EXACERBATION: ICD-10-CM

## 2022-12-27 DIAGNOSIS — R93.89 ABNORMAL CXR: Primary | ICD-10-CM

## 2022-12-27 DIAGNOSIS — J44.1 COPD WITH ACUTE EXACERBATION: Primary | ICD-10-CM

## 2022-12-27 PROCEDURE — 71046 X-RAY EXAM CHEST 2 VIEWS: CPT

## 2022-12-27 RX ORDER — ALBUTEROL SULFATE 2.5 MG/3ML
2.5 SOLUTION RESPIRATORY (INHALATION) 4 TIMES DAILY PRN
Qty: 360 ML | Refills: 2 | Status: SHIPPED | OUTPATIENT
Start: 2022-12-27 | End: 2023-01-26

## 2022-12-27 NOTE — TELEPHONE ENCOUNTER
Patient notified to go get chest xray at [facility].  He states he does not have a nebulizer.  He said his heart rate was running over 100 but not higher than 120.

## 2022-12-27 NOTE — TELEPHONE ENCOUNTER
Please ask him how high is heart rate is running and see if he has a nebulizer he is using. Tell him I would like him to go get a CXR at [facility]. If clear we will do steroids and bring him in to have an oxygen evaluation.

## 2022-12-27 NOTE — TELEPHONE ENCOUNTER
Thank you. CXR order is in. Will call results once they are sent. I am going to prescribe a nebulizer for him. Meds will go to pharmacy. Please find out when you call him with CXR results where he would like us to send neb equipment/supply order.

## 2022-12-27 NOTE — TELEPHONE ENCOUNTER
Patient called stating he is having increased shortness of breath for the past week.  He does not have any other symptoms, he has a cough but not worse than his baseline.  He is using Breztri inhaler, albuterol rescue inhaler and astelin nasal spray.  Patient states when he checked his O2 level it was 95% but his heart rate was high.  Patient states the albuterol does help temporarily.  He is not currently on any antibiotic or steroids.  Patient is not allergic to any medicine.  Patient uses [facility].     Refill request received from [facility] for hydroxyzine.   Please refill if appropriate

## 2022-12-29 DIAGNOSIS — E29.1 HYPOGONADISM, MALE: ICD-10-CM

## 2022-12-29 NOTE — TELEPHONE ENCOUNTER
Pt called requesting a refill on his medication testosterone to be sent to [facility] i've sent this to  to sign off and I told the pt that  is out of office until next week and check with his pharmacy then

## 2023-01-03 RX ORDER — TESTOSTERONE 30 MG/1.5ML
SOLUTION TOPICAL
Qty: 90 ML | Refills: 5 | Status: SHIPPED | OUTPATIENT
Start: 2023-01-03

## 2023-01-04 ENCOUNTER — HOSPITAL ENCOUNTER (OUTPATIENT)
Dept: CT IMAGING | Facility: HOSPITAL | Age: 68
Discharge: HOME | End: 2023-01-04
Admitting: NURSE PRACTITIONER
Payer: MEDICARE

## 2023-01-04 DIAGNOSIS — R93.89 ABNORMAL CXR: ICD-10-CM

## 2023-01-04 PROCEDURE — 71250 CT THORAX DX C-: CPT

## 2023-01-05 DIAGNOSIS — J44.1 COPD WITH ACUTE EXACERBATION: Primary | ICD-10-CM

## 2023-01-05 RX ORDER — PREDNISONE 20 MG/1
40 TABLET ORAL DAILY
Qty: 10 TABLET | Refills: 0 | Status: SHIPPED | OUTPATIENT
Start: 2023-01-05 | End: 2023-01-10

## 2023-02-01 NOTE — PROGRESS NOTES
"Chief Complaint  COPD    Subjective    History of Present Illness     Roc Rees presents to [facility] for:    History of Present Illness  Management of COPD, lung scarring and lung nodule.  His alpha-1 testing is normal. Most recent FEV1 was 48.  He is a former smoker.  CT January 2023 showing a stable 4 mm nodule right upper lobe and stable scarring lingula, right middle lobe and right lower lobe.  Occupational history positive for coal mining.  He was a  for approximately 10 years.  He is wondering if his imaging is consistent with \"coalminer's lung\".  He was involved in an accident while mining and lost his right leg below the knee.  He has not worked in the industry since.  He is obese.  He is typically followed by Dr. Morrison.  Previously on Breo.  Now on Breztri.  He feels that is beneficial.  He had called for steroids on several occasions recently.  We opted to try a nebulizer as opposed to recurrent steroid use.  He indicates the nebulizer is helping.  He uses it 1-2 times per day.  He was exposed to some smoke fumes over the weekend.  He believes this has caused another flareup.  He is wondering if he might benefit from a short course of steroid.  He also indicates over the weekend when he was having difficulty he \"pulled my wife's CPAP out of the closet and was going to use it but I could not get it to fire up\".  He is wondering if he might benefit from a device.  He is not on oxygen.       Prior to Admission medications    Medication Sig Start Date End Date Taking? Authorizing Provider   albuterol sulfate  (90 Base) MCG/ACT inhaler Inhale 2 puffs Every 4 (Four) Hours As Needed for Wheezing or Shortness of Air. 5/17/21   Roc Morrison MD   aspirin 81 MG EC tablet Take 81 mg by mouth Daily.    Provider, MD Gwendolyn   azelastine (ASTELIN) 0.1 % nasal spray 2 sprays into the nostril(s) as directed by provider 2 (Two) Times a " "Day. Use in each nostril as directed 12/21/22   Leo Sparksa GALINA FELTON Aerosphere 160-9-4.8 MCG/ACT aerosol inhaler INHALE 2 PUFFS TWICE DAILY. 5/31/22   Roc Morrison MD   hydroCHLOROthiazide (MICROZIDE) 12.5 MG capsule Take 12.5 mg by mouth Every Morning. 6/26/21   Gwendolyn La MD   losartan (COZAAR) 100 MG tablet Take 100 mg by mouth Daily. 7/23/21   Gwendolyn La MD   tadalafil (Cialis) 20 MG tablet Take 1 tablet by mouth As Needed for Erectile Dysfunction for up to 5 doses. 8/8/22   Roverto Dickens PA   Testosterone (Axiron) 30 MG/ACT solution Apply to underarms daily 1/3/23   Howie Sosa MD   triamcinolone (KENALOG) 0.1 % ointment APPLY TWICE A DAY TO EARS AND NECK FOR 2 WEEKS AT A TIME. 10/30/20   Gwendolyn La MD   vardenafil (LEVITRA) 20 MG tablet TAKE 1 TABLET BY MOUTH AS NEEDED FOR ERECTILE DYSFUNCTION 2/9/22   Howie Sosa MD       Social History     Socioeconomic History   • Marital status:    Tobacco Use   • Smoking status: Former     Packs/day: 1.00     Years: 38.00     Pack years: 38.00     Types: Cigarettes     Passive exposure: Past   • Smokeless tobacco: Never   Vaping Use   • Vaping Use: Never used   Substance and Sexual Activity   • Alcohol use: No   • Drug use: No   • Sexual activity: Defer       Objective   Vital Signs:   /82   Pulse 78   Ht 177.8 cm (70\")   Wt 109 kg (241 lb)   SpO2 93% Comment: RA  BMI 34.58 kg/m²     Physical Exam  Constitutional:       Appearance: He is obese.      Interventions: Face mask in place.   HENT:      Head:      Comments: Hearing aids  Cardiovascular:      Rate and Rhythm: Normal rate and regular rhythm.      Heart sounds: No murmur heard.  Pulmonary:      Effort: Pulmonary effort is normal. No tachypnea, accessory muscle usage or respiratory distress.      Breath sounds: Wheezing present.   Musculoskeletal:      Right lower leg: No edema.      Left lower leg: No edema. "   Neurological:      Mental Status: He is alert and oriented to person, place, and time.        Result Review :    PFT Values        Some values may be hidden. Unless noted otherwise, only the newest values recorded on each date are displayed.         Old Values PFT Results 8/22/22   No data to display.      Pre Drug PFT Results 8/22/22   FVC 64   FEV1 35   FEF 25-75% 11   FEV1/FVC 43      Post Drug PFT Results 8/22/22   No data to display.      Other Tests PFT Results 8/22/22   DLCO 84   D/VAsb 105           My interpretation of the PFT: none    Results for orders placed in visit on 08/22/22    Pulmonary Function Test    Narrative  Pulmonary Function Test  Performed by: Brandy Ruth, RRT  Authorized by: Bernadette Sparks, APRN    Pre Drug % Predicted  FVC: 64%  FEV1: 35%  FEF 25-75%: 11%  FEV1/FVC: 43%  DLCO: 84%  D/VAsb: 105%    Interpretation  Spirometry  Spirometry shows severe obstruction. There is reduced midflow suggesting small airway/airflow obstruction.  Review of FVL curve  There is a flattening of the expiratory curve, suggesting variable intrathoracic obstruction.  Diffusion Capacity  The patient's diffusion capacity is normal.  Diffusion capacity is normal when corrected for alveolar volume.      Results for orders placed during the hospital encounter of 09/11/19    Full Pulmonary Function Test With Bronchodilator    Narrative  [facility] - Pulmonary Function Test    [address] [place] [address]  [id number]  [phone]    Patient : Roc Rees  MRN : [id number]  CSN : [id number]  Pulmonologist : Marv Graves MD  Date : 9/12/2019    ______________________________________________________________________    Interpretation :  1.  Spirometry is consistent with a severe obstructive ventilatory defect.  2.  Lung volumes reveal hyperinflation.  There is also a mild decrease in vital capacity second to obstruction and a mild decrease in inspiratory capacity.  3.  Diffusion capacity is  within normal limits particularly when corrected for alveolar volume.      Marv Graves MD     CT Chest Without Contrast Diagnostic (2023 15:15)    My interpretation of imagin mm nodule right upper lobe, mild pleural thickening in the lower lobes, lingular, right middle lobe and right lower lobe scarring, stable    My interpretation of labs: None      Assessment and Plan     Diagnoses and all orders for this visit:    1. Scarring of lung (Primary)  Comments:  Cannot rule out his history of coal mining as a contributor.  He would need formal occupational health screening.  He would like to hold off    2. Lung nodule  Comments:  We will continue annual surveillance per Fleischner guidelines    3. COPD, group C, by GOLD 2017 classification ([facility])  Comments:  Continue Breztri.  Has rescue inhaler and nebulizer available at home.  PFTs when he returns    4. Personal history of nicotine dependence  Comments:  Continue to avoid smoking.  Low-dose CT 2023 stable.  Continue annually per guidelines    5. Occupational exposure to coal dust  Comments:  Patient is undecided about pursuing occupational assessment for disability secondary to difficult exposure.  Does have pleural thickening and abnormal PFT    6. Class 2 severe obesity due to excess calories with serious comorbidity and body mass index (BMI) of 37.0 to 37.9 in adult ([facility])  Comments:  Contributes to underlying conditions.  Education material provided    7. COPD with acute exacerbation ([facility])  Comments:  Smoke exposure over the weekend.  Prednisone taper.  Orders:  -     predniSONE (DELTASONE) 10 MG tablet; Take 4 tabs daily x 3 days, then take 3 tabs daily x 3 days, then take 2 tabs daily x 3 days, then take 1 tab daily x 3 days  Dispense: 31 tablet; Refill: 0    8. Daytime sleepiness  Comments:  Poor sleep.  Daytime sleepiness.  Falls asleep frequently during during the day.  Discussed overnight oximetry versus sleep study.  He would like  to hold off         Body mass index is 34.58 kg/m². Educational material provided after visit summary about elevated BMI        Bernadette Sparks, GALINA  2/27/2023  11:05 CST    Follow Up   Return in about 6 months (around 8/27/2023) for FVL.    Patient was given instructions and counseling regarding his condition or for health maintenance advice. Please see specific information pulled into the AVS if appropriate.

## 2023-02-03 NOTE — PROGRESS NOTES
Subjective    Mr. Rees is 67 y.o. male    Chief Complaint: 6 months Follow up for testosterone/kidney stone.  TEST-510  PSA-0.755   HEMO-15.7  HEMA-52.1  History of Present Illness  Patient is a 67-year-old gentleman here for 6-month follow-up he has history of hypogonadism he also treat him for erectile dysfunction.  He is on Axiron solution which she applies daily to his underarms.  Still having the response that he likes with the testosterone replacement more energy more for less fatigue his testosterone was 510, PSA was 0.75, hemoglobin 15.7 and hematocrit 52.1 just a point over the cutoff of normal.  He does donate blood fairly frequently.    Also with history of erectile dysfunction he has been taking 20 mg Levitra he states there is some response but the feels he could have better response that he is getting he was asked if there is anything new.  We discussed other treatment options he had taken 20 mg Cialis after discussion I may try him on short course of 100 mg Viagra.      The following portions of the patient's history were reviewed and updated as appropriate: allergies, current medications, past family history, past medical history, past social history, past surgical history and problem list.    Review of Systems   Gastrointestinal: Negative.    Genitourinary: Negative.    All other systems reviewed and are negative.        Current Outpatient Medications:   •  albuterol sulfate  (90 Base) MCG/ACT inhaler, Inhale 2 puffs Every 4 (Four) Hours As Needed for Wheezing or Shortness of Air., Disp: 18 g, Rfl: 11  •  aspirin 81 MG EC tablet, Take 81 mg by mouth Daily., Disp: , Rfl:   •  azelastine (ASTELIN) 0.1 % nasal spray, 2 sprays into the nostril(s) as directed by provider 2 (Two) Times a Day. Use in each nostril as directed, Disp: 30 mL, Rfl: 5  •  Breztri Aerosphere 160-9-4.8 MCG/ACT aerosol inhaler, INHALE 2 PUFFS TWICE DAILY., Disp: 10.7 g, Rfl: 11  •  hydroCHLOROthiazide (MICROZIDE) 12.5 MG  capsule, Take 12.5 mg by mouth Every Morning., Disp: , Rfl:   •  losartan (COZAAR) 100 MG tablet, Take 100 mg by mouth Daily., Disp: , Rfl:   •  tadalafil (Cialis) 20 MG tablet, Take 1 tablet by mouth As Needed for Erectile Dysfunction for up to 5 doses., Disp: 5 tablet, Rfl: 0  •  Testosterone (Axiron) 30 MG/ACT solution, Apply to underarms daily, Disp: 90 mL, Rfl: 5  •  triamcinolone (KENALOG) 0.1 % ointment, APPLY TWICE A DAY TO EARS AND NECK FOR 2 WEEKS AT A TIME., Disp: , Rfl:   •  vardenafil (LEVITRA) 20 MG tablet, Take 1 tablet by mouth As Needed for Erectile Dysfunction., Disp: 10 tablet, Rfl: 11    Past Medical History:   Diagnosis Date   • Class 2 severe obesity due to excess calories with serious comorbidity and body mass index (BMI) of 37.0 to 37.9 in adult ([facility]) 8/10/2021   • COPD, group C, by GOLD 2017 classification ([facility]) 8/10/2021   • History of transfusion 1986   • Hypertension    • Lung nodule 8/10/2021    Right upper lobe, 2007, 4 mm   • Nephrolithiasis    • Obstructive sleep apnea 2/21/2022   • Occupational exposure to coal dust 8/10/2021   • Personal history of nicotine dependence 8/10/2021   • Scarring of lung 8/10/2021   • Shoulder pain    • SOB (shortness of breath)        Past Surgical History:   Procedure Laterality Date   • BELOW KNEE LEG AMPUTATION Right    • LYMPH NODE BIOPSY     • TOTAL SHOULDER ARTHROPLASTY W/ DISTAL CLAVICLE EXCISION Right 1/22/2019    Procedure: RIGHT  REVERSE TOTAL SHOULDER  ARTHROPLASTY;  Surgeon: Donnie Frias MD;  Location: [facility] OR;  Service: Orthopedics   • URETEROSCOPY LASER LITHOTRIPSY WITH STENT INSERTION Left 4/25/2019    Procedure: CYSTOSCOPY LEFT URETERAL BALLOON DILATION AND URETEROSCOPY LASER LITHOTRIPSY WITH LEFT STENT INSERTION;  Surgeon: Tyrel Hilario MD;  Location:  [facility] OR;  Service: Urology   • VEIN SURGERY Left 10/12/2020    Procedure: LEFT SAPHENOUS VEIN CLOSURE using Venaseal;  Surgeon: Edgardo Vargas DO;  Location:  [facility]  "HYBRID OR 12;  Service: Vascular;  Laterality: Left;       Social History     Socioeconomic History   • Marital status:    Tobacco Use   • Smoking status: Former     Packs/day: 1.00     Years: 38.00     Pack years: 38.00     Types: Cigarettes   • Smokeless tobacco: Never   • Tobacco comments:     occassionally smokes trying to quit as of 4/16/2019   Vaping Use   • Vaping Use: Never used   Substance and Sexual Activity   • Alcohol use: No   • Drug use: No   • Sexual activity: Defer       Family History   Problem Relation Age of Onset   • Kidney cancer Father    • No Known Problems Mother        Objective    Temp 97.3 °F (36.3 °C)   Ht 177.8 cm (70\")   Wt 109 kg (241 lb 6.4 oz)   BMI 34.64 kg/m²     Physical Exam  Vitals reviewed.   Constitutional:       Appearance: Normal appearance.   HENT:      Head: Normocephalic and atraumatic.   Pulmonary:      Effort: Pulmonary effort is normal.   Skin:     Coloration: Skin is not pale.   Neurological:      Mental Status: He is alert and oriented to person, place, and time.   Psychiatric:         Mood and Affect: Mood normal.         Behavior: Behavior normal.             Results for orders placed or performed in visit on 02/08/23   POC Urinalysis Dipstick, Multipro    Specimen: Urine   Result Value Ref Range    Color Yellow Yellow, Straw, Dark Yellow, Linda    Clarity, UA Clear Clear    Glucose, UA Negative Negative mg/dL    Bilirubin Negative Negative    Ketones, UA Negative Negative    Specific Gravity  1.020 1.005 - 1.030    Blood, UA Negative Negative    pH, Urine 6.5 5.0 - 8.0    Protein, POC 2+ (A) Negative mg/dL    Urobilinogen, UA 0.2 E.U./dL Normal, 0.2 E.U./dL    Nitrite, UA Negative Negative    Leukocytes Negative Negative   IPSS Questionnaire (AUA-7):  Incomplete emptying  Over the past month, how often have you had a sensation of not emptying your bladder completely after you finish?: Less than half the time (02/08/23 0907)  Frequency  Over the past " month, how often have you had to urinate again less than two hours after you finishing urinating ?: Less than half the time (02/08/23 0907)  Intermittency  Over the past month, how often have you found you stopped and started again several time when you urinated ?: Less thank 1 time in 5 (02/08/23 0907)  Urgency  Over the last month, how difficult  have you found it to postpone urination ?: Less than 1 time in 5 (02/08/23 0907)  Weak Stream  Over the past month, how often have you had a weak urinary stream ?: Almost always (02/08/23 0907)  Straining  Over the past month, how often have you had to push or strain to begin urination ?: Almost always (02/08/23 0907)  Nocturia  Over the past month, how many times did you most typically get up to urinate from the time you went to bed until the time you got up in the morning ?: Less than 1 time in 5 (02/08/23 0907)  Quality of life due to urinary symptoms  If you were to spend the rest of your life with your urinary condition the way it is now, how would feel about that?: Mostly Satisfied (02/08/23 0907)    Scores  Total IPSS Score: 17 (02/08/23 0907)  Total Score = Symtomatic Level: moderately symptomatic: 8-19 (02/08/23 0907)       Assessment and Plan    Diagnoses and all orders for this visit:    1. Low testosterone in male (Primary)  -     POC Urinalysis Dipstick, Multipro  -     Hemoglobin & Hematocrit, Blood; Future  -     Testosterone; Future    2. Impotence of organic origin  -     vardenafil (LEVITRA) 20 MG tablet; Take 1 tablet by mouth As Needed for Erectile Dysfunction.  Dispense: 10 tablet; Refill: 11    Regarding his erectile dysfunction I did refill his Levitra he states it works but is as effective as it once was.  After discussion I will see if he has better response to 100 mg Viagra I prescribed just 3 doses for him to try.  If this should work more effectively he could always call to have a refill or prescription sent in.  I did tell not to take the  Viagra with the Levitra.    Regarding his testosterone and his current dosage we will remain on of the Axiron his testosterone was 510 and is helping his symptoms related to testosterone.  PSA was 0.7 hemoglobin hematocrit the hemoglobin was within normal range but hematocrit was just a point over normal however he does donate blood on a regular basis.  Follow-up 6 months with labs

## 2023-02-08 ENCOUNTER — OFFICE VISIT (OUTPATIENT)
Dept: UROLOGY | Facility: CLINIC | Age: 68
End: 2023-02-08
Payer: COMMERCIAL

## 2023-02-08 VITALS — HEIGHT: 70 IN | WEIGHT: 241.4 LBS | BODY MASS INDEX: 34.56 KG/M2 | TEMPERATURE: 97.3 F

## 2023-02-08 DIAGNOSIS — R79.89 LOW TESTOSTERONE IN MALE: Primary | ICD-10-CM

## 2023-02-08 DIAGNOSIS — N52.9 IMPOTENCE OF ORGANIC ORIGIN: ICD-10-CM

## 2023-02-08 LAB
BILIRUB BLD-MCNC: NEGATIVE MG/DL
CLARITY, POC: CLEAR
COLOR UR: YELLOW
GLUCOSE UR STRIP-MCNC: NEGATIVE MG/DL
KETONES UR QL: NEGATIVE
LEUKOCYTE EST, POC: NEGATIVE
NITRITE UR-MCNC: NEGATIVE MG/ML
PH UR: 6.5 [PH] (ref 5–8)
PROT UR STRIP-MCNC: ABNORMAL MG/DL
RBC # UR STRIP: NEGATIVE /UL
SP GR UR: 1.02 (ref 1–1.03)
UROBILINOGEN UR QL: ABNORMAL

## 2023-02-08 PROCEDURE — 81001 URINALYSIS AUTO W/SCOPE: CPT | Performed by: PHYSICIAN ASSISTANT

## 2023-02-08 PROCEDURE — 99214 OFFICE O/P EST MOD 30 MIN: CPT | Performed by: PHYSICIAN ASSISTANT

## 2023-02-08 RX ORDER — SILDENAFIL 100 MG/1
100 TABLET, FILM COATED ORAL AS NEEDED
Qty: 3 TABLET | Refills: 0
Start: 2023-02-08 | End: 2023-03-30

## 2023-02-08 RX ORDER — VARDENAFIL HYDROCHLORIDE 20 MG/1
20 TABLET ORAL AS NEEDED
Qty: 10 TABLET | Refills: 11 | Status: SHIPPED | OUTPATIENT
Start: 2023-02-08 | End: 2023-03-30

## 2023-02-27 ENCOUNTER — OFFICE VISIT (OUTPATIENT)
Dept: PULMONOLOGY | Facility: CLINIC | Age: 68
End: 2023-02-27
Payer: COMMERCIAL

## 2023-02-27 VITALS
SYSTOLIC BLOOD PRESSURE: 142 MMHG | OXYGEN SATURATION: 93 % | HEART RATE: 78 BPM | DIASTOLIC BLOOD PRESSURE: 82 MMHG | BODY MASS INDEX: 34.5 KG/M2 | HEIGHT: 70 IN | WEIGHT: 241 LBS

## 2023-02-27 DIAGNOSIS — J44.1 COPD WITH ACUTE EXACERBATION: ICD-10-CM

## 2023-02-27 DIAGNOSIS — R40.0 DAYTIME SLEEPINESS: Chronic | ICD-10-CM

## 2023-02-27 DIAGNOSIS — J98.4 SCARRING OF LUNG: Primary | Chronic | ICD-10-CM

## 2023-02-27 DIAGNOSIS — Z87.891 PERSONAL HISTORY OF NICOTINE DEPENDENCE: Chronic | ICD-10-CM

## 2023-02-27 DIAGNOSIS — E66.01 CLASS 2 SEVERE OBESITY DUE TO EXCESS CALORIES WITH SERIOUS COMORBIDITY AND BODY MASS INDEX (BMI) OF 37.0 TO 37.9 IN ADULT: Chronic | ICD-10-CM

## 2023-02-27 DIAGNOSIS — J44.9 COPD, GROUP C, BY GOLD 2017 CLASSIFICATION: Chronic | ICD-10-CM

## 2023-02-27 DIAGNOSIS — R91.1 LUNG NODULE: Chronic | ICD-10-CM

## 2023-02-27 DIAGNOSIS — Z57.2 OCCUPATIONAL EXPOSURE TO COAL DUST: Chronic | ICD-10-CM

## 2023-02-27 PROCEDURE — 99214 OFFICE O/P EST MOD 30 MIN: CPT | Performed by: NURSE PRACTITIONER

## 2023-02-27 RX ORDER — PREDNISONE 10 MG/1
TABLET ORAL
Qty: 31 TABLET | Refills: 0 | Status: SHIPPED | OUTPATIENT
Start: 2023-02-27

## 2023-02-27 SDOH — HEALTH STABILITY - PHYSICAL HEALTH: OCCUPATIONAL EXPOSURE TO DUST: Z57.2

## 2023-03-23 ENCOUNTER — TELEPHONE (OUTPATIENT)
Dept: VASCULAR SURGERY | Facility: CLINIC | Age: 68
End: 2023-03-23
Payer: COMMERCIAL

## 2023-03-23 NOTE — TELEPHONE ENCOUNTER
Called the patient to remind him of his testing and appt tomorrow with Felicia.  Pt confirmed.  I did tell him that he could come over right after his testing.

## 2023-03-24 ENCOUNTER — HOSPITAL ENCOUNTER (OUTPATIENT)
Dept: ULTRASOUND IMAGING | Facility: HOSPITAL | Age: 68
Discharge: HOME | End: 2023-03-24
Payer: MEDICARE

## 2023-03-24 ENCOUNTER — OFFICE VISIT (OUTPATIENT)
Dept: VASCULAR SURGERY | Facility: CLINIC | Age: 68
End: 2023-03-24
Payer: COMMERCIAL

## 2023-03-24 VITALS — OXYGEN SATURATION: 98 % | SYSTOLIC BLOOD PRESSURE: 142 MMHG | DIASTOLIC BLOOD PRESSURE: 92 MMHG | HEART RATE: 81 BPM

## 2023-03-24 DIAGNOSIS — I73.9 PAD (PERIPHERAL ARTERY DISEASE): Primary | ICD-10-CM

## 2023-03-24 DIAGNOSIS — I87.322 CHRONIC VENOUS HYPERTENSION WITH INFLAMMATION INVOLVING LEFT SIDE: ICD-10-CM

## 2023-03-24 DIAGNOSIS — I65.23 BILATERAL CAROTID ARTERY STENOSIS: ICD-10-CM

## 2023-03-24 DIAGNOSIS — I10 ESSENTIAL HYPERTENSION: ICD-10-CM

## 2023-03-24 DIAGNOSIS — I73.9 PAD (PERIPHERAL ARTERY DISEASE): ICD-10-CM

## 2023-03-24 PROCEDURE — 93880 EXTRACRANIAL BILAT STUDY: CPT | Performed by: SURGERY

## 2023-03-24 PROCEDURE — 93880 EXTRACRANIAL BILAT STUDY: CPT

## 2023-03-24 PROCEDURE — 93923 UPR/LXTR ART STDY 3+ LVLS: CPT | Performed by: SURGERY

## 2023-03-24 PROCEDURE — 93923 UPR/LXTR ART STDY 3+ LVLS: CPT

## 2023-03-24 NOTE — PROGRESS NOTES
03/24/2023        Medhat Petty DO   [address] [place] [address]    Roc Rees  1955    Chief Complaint   Patient presents with   • Follow-up     1 yr f/u with ABIs and carotid testing.  Last seen in the office on 4/18/22.  Pt denies any changes in his legs.  Pt denies any stroke type symptoms.       Dear Medhat Petty DO        HPI  I had the pleasure of seeing your patient Roc Rees in the office today.  As you recall, Roc Rees is a 67 y.o.  male who we are following for venous insufficiency and carotid occlusive disease.  He does have a history of right below-knee amputation following trauma.   He has previously undergone greater saphenous vein stripped from the knee to the ankle in an effort to save his right lower extremity.  He did undergo endovenous ablation of the left lower extremity with venaseal on 10/12/2020.  Currently he is doing well and denies any strokelike symptoms or claudication to his lower extremities.  He is maintained on aspirin.  He did have noninvasive testing performed today, which I did review in office.     Review of Systems   Constitutional: Negative.    HENT: Negative.    Eyes: Negative.    Respiratory: Negative.    Cardiovascular: Positive for leg swelling.   Gastrointestinal: Negative.    Endocrine: Negative.    Genitourinary: Negative.    Musculoskeletal: Positive for gait problem.   Skin: Positive for color change.   Allergic/Immunologic: Negative.    Hematological: Negative.    Psychiatric/Behavioral: Negative.    All other systems reviewed and are negative.       /92   Pulse 81   SpO2 98%      Physical Exam  Vitals and nursing note reviewed.   Constitutional:       General: He is not in acute distress.     Appearance: Normal appearance. He is well-developed. He is obese. He is not diaphoretic.   HENT:      Head: Normocephalic and atraumatic.   Eyes:      Pupils: Pupils are equal, round, and reactive to light.   Neck:      Vascular: No carotid bruit or  JVD.   Cardiovascular:      Rate and Rhythm: Normal rate and regular rhythm.      Pulses: Normal pulses.           Femoral pulses are 2+ on the right side and 2+ on the left side.       Popliteal pulses are 2+ on the right side and 2+ on the left side.        Dorsalis pedis pulses are 2+ on the left side.        Posterior tibial pulses are 2+ on the left side.      Heart sounds: Normal heart sounds, S1 normal and S2 normal. No murmur heard.    No friction rub. No gallop.      Comments: Varicose veins to his left lower extremity.  Pulmonary:      Effort: Pulmonary effort is normal.      Breath sounds: Normal breath sounds.   Abdominal:      General: Bowel sounds are normal. There is no abdominal bruit.      Palpations: Abdomen is soft.      Tenderness: There is no abdominal tenderness.   Musculoskeletal:         General: Deformity (right BKA) present. Normal range of motion.      Right Lower Extremity: Right leg is amputated below knee.   Skin:     General: Skin is warm and dry.      Findings: Rash (Eczema to left ankle) present.   Neurological:      General: No focal deficit present.      Mental Status: He is alert and oriented to person, place, and time.      Cranial Nerves: No cranial nerve deficit.   Psychiatric:         Mood and Affect: Mood normal.         Behavior: Behavior normal.         Thought Content: Thought content normal.         Judgment: Judgment normal.     Diagnostic data:  US Carotid Bilateral    Result Date: 3/24/2023  Narrative: History: Carotid occlusive disease      Impression: Impression: 1. There is less than 50% stenosis of the right internal carotid artery. 2. There is less than 50% stenosis of the left internal carotid artery. 3. Antegrade flow is demonstrated in bilateral vertebral arteries.  Comments: Bilateral carotid vertebral arterial duplex scan was performed.  Grayscale imaging shows intimal thickening and calcified elements at the carotid bifurcation. The right internal carotid  artery peak systolic velocity is 72.1 cm/sec. The end-diastolic velocity is 21.3 cm/sec. The right ICA/CCA ratio is approximately 0.8 . These findings correlate with less than 50% stenosis of the right internal carotid artery.  Grayscale imaging shows intimal thickening and calcified elements at the carotid bifurcation. The left internal carotid artery peak systolic velocity is 99.6 cm/sec. The end-diastolic velocity is 25.8 cm/sec. The left ICA/CCA ratio is approximately 0.8 . These findings correlate with less than 50% stenosis of the left internal carotid artery.  Antegrade flow is demonstrated in bilateral vertebral arteries. Greater than 50% stenosis of the proximal left external carotid artery. This report was finalized on 03/24/2023 16:03 by Dr. Medhat Post MD.    US Ankle / Brachial Indices Extremity Complete    Result Date: 3/24/2023  Narrative:  History: PAD  Comments: Left lower extremity arterial with multi-level pulse volume recordings and segmental pressures were performed at rest and stress.  The left ankle/brachial index is 1.13. Doppler waveforms are triphasic and PVR waveform at the ankle is normal-appearing with no significant dampening. These findings are consistent with no significant arterial insufficiency of the left lower extremity at rest.      Impression: Impression: 1. No significant arterial insufficiency of the left lower extremity at rest.   This report was finalized on 03/24/2023 16:23 by Dr. Medhat Post MD.       Patient Active Problem List   Diagnosis   • BPH (benign prostatic hypertrophy) with urinary obstruction   • Impotence due to erectile dysfunction   • Hypogonadism in male   • Erectile dysfunction   • Benign prostatic hyperplasia with lower urinary tract symptoms   • Rotator cuff arthropathy of right shoulder   • Primary osteoarthritis of right shoulder   • Microscopic hematuria   • Renal stone   • Hydronephrosis with renal and ureteral calculus obstruction   • Ureteral  stone   • Renal cyst   • Ureteral stone with hydronephrosis   • Hypertension   • Chronic venous hypertension with inflammation involving left side   • COPD, group C, by GOLD 2017 classification ([facility])   • Personal history of nicotine dependence   • Scarring of lung   • Occupational exposure to coal dust   • Class 2 severe obesity due to excess calories with serious comorbidity and body mass index (BMI) of 37.0 to 37.9 in adult ([facility])   • Lung nodule   • Colorectal polyps   • Obstructive sleep apnea         ICD-10-CM ICD-9-CM   1. PAD (peripheral artery disease) ([facility])  I73.9 443.9   2. Bilateral carotid artery stenosis  I65.23 433.10     433.30   3. Chronic venous hypertension with inflammation involving left side  I87.322 459.32   4. Essential hypertension  I10 401.9         Plan: After thoroughly evaluating Roc Rees, I believe the best course of action is to remain conservative from a vascular surgery standpoint.  Currently he is doing well and denies any claudication to his left leg.  He is having no problems with his right below-knee amputation.  I did review his testing which shows less than 50% carotid stenosis bilaterally.  His ABIs show no arterial insufficiency to his left lower extremity.  I would recommend he continue wearing compression stocking on his left leg.  We will see him back in 1 year with repeat noninvasive testing for continued surveillance, including ABIs and a carotid duplex.  I did discuss vascular risk factors as they pertain to the progression of vascular disease including controlling his hypertension.  The patient can continue taking their current medication regimen as previously planned.  This was all discussed in full with complete understanding.    Thank you for allowing me to participate in the care of your patient.  Please do not hesitate with any questions or concerns.  I will keep you aware of any further encounters with Roc Rees.        Sincerely yours,         Felicia  Alejandrina, APRN

## 2023-04-03 ENCOUNTER — APPOINTMENT (OUTPATIENT)
Dept: ULTRASOUND IMAGING | Facility: HOSPITAL | Age: 68
End: 2023-04-03

## 2023-05-01 DIAGNOSIS — J44.9 COPD, GROUP C, BY GOLD 2017 CLASSIFICATION: Primary | ICD-10-CM

## 2023-05-01 RX ORDER — BUDESONIDE, GLYCOPYRROLATE, AND FORMOTEROL FUMARATE 160; 9; 4.8 UG/1; UG/1; UG/1
AEROSOL, METERED RESPIRATORY (INHALATION)
Qty: 10.7 G | Refills: 11 | Status: SHIPPED | OUTPATIENT
Start: 2023-05-01

## 2023-05-01 NOTE — TELEPHONE ENCOUNTER
Pharmacy sent a request for refills on Breztri. Routed to Bernadette MOJICA.  Rx Refill Note  Requested Prescriptions     Pending Prescriptions Disp Refills   • Breztri Aerosphere 160-9-4.8 MCG/ACT aerosol inhaler [Pharmacy Med Name: BREZTRI AEROSPHERE 160MCG/ACT-4.8MCG/ACT-9MCG/ACT AEROSOL] 10.7 g 11     Sig: INHALE 2 PUFFS TWICE DAILY.      Last office visit with prescribing clinician: 02/27/2023  Last telemedicine visit with prescribing clinician: 8/30/2023   Next office visit with prescribing clinician: 08/30/2023                         Would you like a call back once the refill request has been completed: [] Yes [] No    If the office needs to give you a call back, can they leave a voicemail: [] Yes [] No    Ajay Stephens, [facility]  05/01/23, 13:48 CDT

## 2023-06-19 DIAGNOSIS — R09.82 POSTNASAL DRIP: ICD-10-CM

## 2023-06-19 RX ORDER — AZELASTINE 1 MG/ML
2 SPRAY, METERED NASAL 2 TIMES DAILY
Qty: 30 ML | Refills: 5 | Status: SHIPPED | OUTPATIENT
Start: 2023-06-19

## 2023-06-19 NOTE — TELEPHONE ENCOUNTER
Received a fax from [facility] Javier Barrientos for refills on azelastine.  I spoke with the patient and he does not need this refill before Bernadette gets back in the office on Monday.    Rx Refill Note  Requested Prescriptions     Pending Prescriptions Disp Refills    azelastine (ASTELIN) 0.1 % nasal spray 30 mL 5     Si sprays into the nostril(s) as directed by provider 2 (Two) Times a Day. Use in each nostril as directed      Last office visit with prescribing clinician: 2023   Last telemedicine visit with prescribing clinician: Visit date not found   Next office visit with prescribing clinician: 2023                         Would you like a call back once the refill request has been completed: [] Yes [] No    If the office needs to give you a call back, can they leave a voicemail: [] Yes [] No    Rabia Ledezma MA  23, 10:00 CDT

## 2023-07-02 PROBLEM — R77.8 ELEVATED TROPONIN LEVEL NOT DUE MYOCARDIAL INFARCTION: Status: ACTIVE | Noted: 2023-07-02

## 2023-07-02 PROBLEM — F17.201 TOBACCO ABUSE, IN REMISSION: Status: ACTIVE | Noted: 2023-07-02

## 2023-07-02 PROBLEM — J96.01 ACUTE RESPIRATORY FAILURE WITH HYPOXIA: Status: ACTIVE | Noted: 2023-07-02

## 2023-07-02 PROBLEM — R79.89 ELEVATED TROPONIN LEVEL NOT DUE MYOCARDIAL INFARCTION: Status: ACTIVE | Noted: 2023-07-02

## 2023-07-02 PROBLEM — J44.1 COPD EXACERBATION: Status: ACTIVE | Noted: 2023-07-02

## 2023-07-31 ENCOUNTER — LAB (OUTPATIENT)
Dept: UROLOGY | Facility: CLINIC | Age: 68
End: 2023-07-31
Payer: COMMERCIAL

## 2023-07-31 ENCOUNTER — TELEPHONE (OUTPATIENT)
Dept: PULMONOLOGY | Facility: CLINIC | Age: 68
End: 2023-07-31
Payer: COMMERCIAL

## 2023-07-31 DIAGNOSIS — R79.89 LOW TESTOSTERONE IN MALE: ICD-10-CM

## 2023-07-31 DIAGNOSIS — J44.1 COPD WITH ACUTE EXACERBATION: Primary | ICD-10-CM

## 2023-07-31 RX ORDER — PREDNISONE 10 MG/1
TABLET ORAL
Qty: 31 TABLET | Refills: 0 | Status: SHIPPED | OUTPATIENT
Start: 2023-07-31

## 2023-08-01 PROBLEM — F17.201 TOBACCO ABUSE, IN REMISSION: Status: RESOLVED | Noted: 2023-07-02 | Resolved: 2023-08-01

## 2023-08-01 PROBLEM — J96.01 ACUTE RESPIRATORY FAILURE WITH HYPOXIA: Status: RESOLVED | Noted: 2023-07-02 | Resolved: 2023-08-01

## 2023-08-01 PROBLEM — J44.1 COPD EXACERBATION: Status: RESOLVED | Noted: 2023-07-02 | Resolved: 2023-08-01

## 2023-08-01 LAB
HCT VFR BLD AUTO: 53.5 % (ref 37.5–51)
HGB BLD-MCNC: 17.2 G/DL (ref 13–17.7)
TESTOST SERPL-MCNC: 1166 NG/DL (ref 264–916)

## 2023-08-01 NOTE — PROGRESS NOTES
Chief Complaint  Scarring of lung    Subjective    History of Present Illness {CC  Problem List  Visit Diagnosis   Encounters  Notes  Medications  Labs  Result Review Imaging  Media: 23}    Roc Rees presents to [facility] for:    History of Present Illness  Management of COPD, lung scarring and lung nodule. His alpha-1 testing is normal. Most recent FEV1 was 35. He is a former smoker. CT January 2023 showing a stable 4 mm nodule right upper lobe and stable scarring lingula, right middle lobe and right lower lobe. 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 He has not worked in the industry since.     He is obese. He is typically followed by Dr. Morrison. Previously on Breo. Now on Breztri. He feels that is beneficial.  He has a nebulizer he can use when needed.  He has not been requiring this or his rescue inhaler much lately.  He does need a refill on his rescue inhaler however. He also had a hospitalization last month for an exacerbation.  He did initially require oxygen.  Upon discharge he was ambulated and did not qualify for portable oxygen.  He was started on 2 L of nocturnal oxygen.  He is uncertain if it is helping or not but he is compliant with it.  He does report he is breathing better. We have previously discussed ordering a sleep study.  He wanted to hold off on that.  He still is not interested in pursuing that.  Allergies are doing better on the azelastine.  He believes he is in need of refills.     Prior to Admission medications    Medication Sig Start Date End Date Taking? Authorizing Provider   albuterol sulfate  (90 Base) MCG/ACT inhaler Inhale 2 puffs Every 4 (Four) Hours As Needed for Wheezing or Shortness of Air. 5/17/21   Roc Morrison MD   aspirin 81 MG EC tablet Take 1 tablet by mouth  "Daily.    Gwendolyn La MD   Azelastine HCl 137 MCG/SPRAY solution 2 sprays into the nostril(s) as directed by provider 2 (Two) Times a Day.    Gwendolyn La MD Breztralexia Aerosphere 160-9-4.8 MCG/ACT aerosol inhaler INHALE 2 PUFFS TWICE DAILY. 5/1/23   Bernadette Sparks APRN   guaiFENesin (MUCINEX) 600 MG 12 hr tablet Take 2 tablets by mouth Every 12 (Twelve) Hours. 7/5/23   Julieth Anderson APRN   hydroCHLOROthiazide (MICROZIDE) 12.5 MG capsule Take 1 capsule by mouth Every Morning. 6/26/21   Gwendolyn La MD   losartan (COZAAR) 100 MG tablet Take 1 tablet by mouth Daily. 7/23/21   Gwendolyn La MD   montelukast (SINGULAIR) 10 MG tablet Take 1 tablet by mouth Every Night. 7/5/23   Julieth Anderson APRN   predniSONE (DELTASONE) 10 MG tablet Take 4 tabs daily x 3 days, then take 3 tabs daily x 3 days, then take 2 tabs daily x 3 days, then take 1 tab daily x 3 days 7/31/23   Bernadette Sparks APRN   Testosterone (Axiron) 30 MG/ACT solution Apply to underarms daily 1/3/23   Howie Sosa MD   vardenafil (LEVITRA) 20 MG tablet Take 1 tablet by mouth Daily As Needed for Erectile Dysfunction.    ProviderGwendolyn MD       Social History     Socioeconomic History    Marital status:    Tobacco Use    Smoking status: Former     Packs/day: 1.00     Years: 38.00     Pack years: 38.00     Types: Cigarettes     Passive exposure: Past    Smokeless tobacco: Never   Vaping Use    Vaping Use: Never used   Substance and Sexual Activity    Alcohol use: No    Drug use: No    Sexual activity: Defer       Objective   Vital Signs:   /82   Pulse 73   Ht 177.8 cm (70\")   Wt 106 kg (233 lb 6.4 oz)   SpO2 95% Comment: RA  BMI 33.49 kg/mý     Physical Exam  Constitutional:       Appearance: He is obese.   HENT:      Head:      Comments: Hearing aids  Cardiovascular:      Rate and Rhythm: Normal rate and regular rhythm.      Heart sounds: No murmur heard.  Pulmonary:      " Effort: Pulmonary effort is normal.      Breath sounds: Normal breath sounds.   Musculoskeletal:      Right lower leg: No edema.      Left lower leg: No edema.      Comments: Right lower extremity amputee, prosthesis   Neurological:      Mental Status: He is alert and oriented to person, place, and time.      Result Review :    PFT Values          8/22/2022    10:45   Pre Drug PFT Results   FVC 64   FEV1 35   FEF 25-75% 11   FEV1/FVC 43   Other Tests PFT Results   DLCO 84   D/VAsb 105     My interpretation of the PFT : none    Results for orders placed in visit on 08/22/22    Pulmonary Function Test    Narrative  Pulmonary Function Test  Performed by: Brandy Ruth, RRT  Authorized by: Bernadette Sparks APRN    Pre Drug % Predicted  FVC: 64%  FEV1: 35%  FEF 25-75%: 11%  FEV1/FVC: 43%  DLCO: 84%  D/VAsb: 105%    Interpretation  Spirometry  Spirometry shows severe obstruction. There is reduced midflow suggesting small airway/airflow obstruction.  Review of FVL curve  There is a flattening of the expiratory curve, suggesting variable intrathoracic obstruction.  Diffusion Capacity  The patient's diffusion capacity is normal.  Diffusion capacity is normal when corrected for alveolar volume.      Results for orders placed during the hospital encounter of 09/11/19    Full Pulmonary Function Test With Bronchodilator    Narrative  [facility] - Pulmonary Function Test    [address] [place] [address]  [place]  [address]  [phone]    Patient : Roc Rees  MRN : [id number]  CSN : [id number]  Pulmonologist : Marv Graves MD  Date : 9/12/2019    ______________________________________________________________________    Interpretation :  1.  Spirometry is consistent with a severe obstructive ventilatory defect.  2.  Lung volumes reveal hyperinflation.  There is also a mild decrease in vital capacity second to obstruction and a mild decrease in inspiratory capacity.  3.  Diffusion capacity is within normal limits  particularly when corrected for alveolar volume.      Marv Graves MD    XR Chest 1 View (07/02/2023 17:22)     My interpretation of imaging: Bibasilar atelectasis    My interpretation of labs: none      Assessment and Plan {CC Problem List  Visit Diagnosis  ROS  Review (Popup)  Health Maintenance  Quality  BestPractice  Medications  SmartSets  SnapShot Encounters  Media : 23}    Diagnoses and all orders for this visit:    1. COPD, group C, by GOLD 2017 classification ([facility])  Comments:  Continue Breztri.  Albuterol as needed.  Refill sent to pharmacy.  PFTs with follow-up  Orders:  -     albuterol sulfate  (90 Base) MCG/ACT inhaler; Inhale 2 puffs Every 4 (Four) Hours As Needed for Wheezing or Shortness of Air for up to 30 days.  Dispense: 8 g; Refill: 5    2. Lung nodule  Comments:  Low-dose CT due with follow-up.  Order placed    3. Scarring of lung  Comments:  Low-dose CT due with follow-up. Order placed    4. Obstructive sleep apnea  Comments:  Continue 2 L of oxygen with sleep.  He is not interested in pursuing any sleep studies    5. Personal history of nicotine dependence  Comments:  Low-dose CT due with follow-up. Order placed  Orders:  -      CT Chest Low Dose Cancer Screening WO; Future    6. Occupational exposure to coal dust  Comments:  Repeat CT and breathing test due with follow-up    7. Environmental allergies  Comments:  Doing well on azelastine.  Refill sent to pharmacy  Orders:  -     azelastine (ASTELIN) 0.1 % nasal spray; 2 sprays into the nostril(s) as directed by provider 2 (Two) Times a Day for 30 days. Use in each nostril as directed  Dispense: 1 each; Refill: 5        Body mass index is 33.49 kg/mý. Educational material provided after visit summary about elevated BMI      Bernadette Sparks, GALINA  8/23/2023  09:22 CDT    Follow Up   Return in about 6 months (around 2/23/2024) for FVL with diffusion.    Patient was given instructions and counseling regarding his  condition or for health maintenance advice. Please see specific information pulled into the AVS if appropriate.

## 2023-08-02 NOTE — PROGRESS NOTES
Subjective    Mr. Rees is 67 y.o. male    Chief Complaint: 6 month follow up low testosterone     History of Present Illness  Patient is a 67-year-old gentleman who presents for 6-month follow-up with history of low testosterone.  Patient uses Axiron solution which he applies to his underarms.  His most recent testosterone was abnormal at 1166.  His testosterone was 510 his hemoglobin was normal at 17.2 with above normal hematocrit at 53.5.  Patient states he has had or was having worsening shortness of breath as well as itching.'s he does have a history of COPD.  However he thought some of his symptoms could be related to his abnormally high testosterone.  He is using the same strength and dose he has stopped the testosterone for now and believes his symptoms have improved.  Also states has had some mild worsening of his voiding symptoms.    The following portions of the patient's history were reviewed and updated as appropriate: allergies, current medications, past family history, past medical history, past social history, past surgical history and problem list.    Review of Systems   Genitourinary:  Positive for difficulty urinating.       Current Outpatient Medications:     albuterol sulfate  (90 Base) MCG/ACT inhaler, Inhale 2 puffs Every 4 (Four) Hours As Needed for Wheezing or Shortness of Air., Disp: 18 g, Rfl: 11    aspirin 81 MG EC tablet, Take 1 tablet by mouth Daily., Disp: , Rfl:     Azelastine HCl 137 MCG/SPRAY solution, 2 sprays into the nostril(s) as directed by provider 2 (Two) Times a Day., Disp: , Rfl:     Breztri Aerosphere 160-9-4.8 MCG/ACT aerosol inhaler, INHALE 2 PUFFS TWICE DAILY., Disp: 10.7 g, Rfl: 11    guaiFENesin (MUCINEX) 600 MG 12 hr tablet, Take 2 tablets by mouth Every 12 (Twelve) Hours., Disp: 20 tablet, Rfl: 0    hydroCHLOROthiazide (MICROZIDE) 12.5 MG capsule, Take 1 capsule by mouth Every Morning., Disp: , Rfl:     losartan (COZAAR) 100 MG tablet, Take 1 tablet by mouth  Daily., Disp: , Rfl:     montelukast (SINGULAIR) 10 MG tablet, Take 1 tablet by mouth Every Night., Disp: 30 tablet, Rfl: 0    predniSONE (DELTASONE) 10 MG tablet, Take 4 tabs daily x 3 days, then take 3 tabs daily x 3 days, then take 2 tabs daily x 3 days, then take 1 tab daily x 3 days, Disp: 31 tablet, Rfl: 0    Testosterone (Axiron) 30 MG/ACT solution, Apply to underarms daily, Disp: 90 mL, Rfl: 5    vardenafil (LEVITRA) 20 MG tablet, Take 1 tablet by mouth Daily As Needed for Erectile Dysfunction., Disp: , Rfl:     Past Medical History:   Diagnosis Date    Class 2 severe obesity due to excess calories with serious comorbidity and body mass index (BMI) of 37.0 to 37.9 in adult 8/10/2021    COPD, group C, by GOLD 2017 classification 8/10/2021    History of transfusion 1986    Hypertension     Lung nodule 8/10/2021    Right upper lobe, 2007, 4 mm    Nephrolithiasis     Obstructive sleep apnea 2/21/2022    Occupational exposure to coal dust 8/10/2021    Personal history of nicotine dependence 8/10/2021    Scarring of lung 8/10/2021    Shoulder pain     SOB (shortness of breath)        Past Surgical History:   Procedure Laterality Date    BELOW KNEE LEG AMPUTATION Right     LYMPH NODE BIOPSY      TOTAL SHOULDER ARTHROPLASTY W/ DISTAL CLAVICLE EXCISION Right 1/22/2019    Procedure: RIGHT  REVERSE TOTAL SHOULDER  ARTHROPLASTY;  Surgeon: Donnie Frias MD;  Location:  [facility] OR;  Service: Orthopedics    URETEROSCOPY LASER LITHOTRIPSY WITH STENT INSERTION Left 4/25/2019    Procedure: CYSTOSCOPY LEFT URETERAL BALLOON DILATION AND URETEROSCOPY LASER LITHOTRIPSY WITH LEFT STENT INSERTION;  Surgeon: Tyrel Hilario MD;  Location:  [facility];  Service: Urology    VEIN SURGERY Left 10/12/2020    Procedure: LEFT SAPHENOUS VEIN CLOSURE using Venaseal;  Surgeon: Edgardo Vargas DO;  Location:  [facility] OR 12;  Service: Vascular;  Laterality: Left;       Social History     Socioeconomic History    Marital  "status:    Tobacco Use    Smoking status: Former     Packs/day: 1.00     Years: 38.00     Pack years: 38.00     Types: Cigarettes     Passive exposure: Past    Smokeless tobacco: Never   Vaping Use    Vaping Use: Never used   Substance and Sexual Activity    Alcohol use: No    Drug use: No    Sexual activity: Defer       Family History   Problem Relation Age of Onset    Kidney cancer Father     No Known Problems Mother        Objective    Temp 98.1 øF (36.7 øC)   Ht 177.8 cm (70\")   Wt 107 kg (235 lb 12.8 oz)   BMI 33.83 kg/mý     Physical Exam  Vitals reviewed.   Constitutional:       Appearance: Normal appearance.   HENT:      Head: Normocephalic and atraumatic.   Pulmonary:      Effort: Pulmonary effort is normal.   Musculoskeletal:      Comments: Right below the knee amputee   Skin:     Coloration: Skin is not pale.   Neurological:      Mental Status: He is alert and oriented to person, place, and time.   Psychiatric:         Mood and Affect: Mood normal.         Behavior: Behavior normal.           Results for orders placed or performed in visit on 08/07/23   POC Urinalysis Dipstick, Multipro    Specimen: Urine   Result Value Ref Range    Color Yellow Yellow, Straw, Dark Yellow, Linda    Clarity, UA Clear Clear    Glucose, UA Negative Negative mg/dL    Bilirubin Negative Negative    Ketones, UA Negative Negative    Specific Gravity  1.020 1.005 - 1.030    Blood, UA Trace (A) Negative    pH, Urine 7.0 5.0 - 8.0    Protein, POC 30 mg/dL (A) Negative mg/dL    Urobilinogen, UA Normal Normal, 0.2 E.U./dL    Nitrite, UA Negative Negative    Leukocytes Negative Negative     Assessment and Plan    Diagnoses and all orders for this visit:    1. Low testosterone in male (Primary)  -     POC Urinalysis Dipstick, Multipro  -     Hemoglobin & Hematocrit, Blood; Future  -     Testosterone, Free, Total; Future    2. Impotence of organic origin    Patient was having side effects that he is not sure was related to his " testosterone being high or not however his testosterone was abnormally elevated at 1166.  Also heme hematocrit was above normal with a borderline high hemoglobin.  He had stopped the testosterone on his own which he thinks has helped slightly.  He has seen his PCP.  We had a long discussion regarding pros and cons of continuing to treat and possible side effects he is also having slightly worsening voiding symptoms which I explained can happen with testosterone replacement as it does cause a growth of the prostate.  For now I recommend he stay off the testosterone replacement and if he does not have any appreciably worsening low testosterone symptoms I explained there is no harm to him if we do not treat this.  Will repeat labs in 3 months.

## 2023-08-04 ENCOUNTER — TELEPHONE (OUTPATIENT)
Dept: UROLOGY | Facility: CLINIC | Age: 68
End: 2023-08-04
Payer: COMMERCIAL

## 2023-08-07 ENCOUNTER — OFFICE VISIT (OUTPATIENT)
Dept: UROLOGY | Facility: CLINIC | Age: 68
End: 2023-08-07
Payer: COMMERCIAL

## 2023-08-07 VITALS — HEIGHT: 70 IN | WEIGHT: 235.8 LBS | TEMPERATURE: 98.1 F | BODY MASS INDEX: 33.76 KG/M2

## 2023-08-07 DIAGNOSIS — N52.9 IMPOTENCE OF ORGANIC ORIGIN: ICD-10-CM

## 2023-08-07 DIAGNOSIS — R79.89 LOW TESTOSTERONE IN MALE: Primary | ICD-10-CM

## 2023-08-07 LAB
BILIRUB BLD-MCNC: NEGATIVE MG/DL
CLARITY, POC: CLEAR
COLOR UR: YELLOW
GLUCOSE UR STRIP-MCNC: NEGATIVE MG/DL
KETONES UR QL: NEGATIVE
LEUKOCYTE EST, POC: NEGATIVE
NITRITE UR-MCNC: NEGATIVE MG/ML
PH UR: 7 [PH] (ref 5–8)
PROT UR STRIP-MCNC: ABNORMAL MG/DL
RBC # UR STRIP: ABNORMAL /UL
SP GR UR: 1.02 (ref 1–1.03)
UROBILINOGEN UR QL: NORMAL

## 2023-08-07 PROCEDURE — 99214 OFFICE O/P EST MOD 30 MIN: CPT | Performed by: PHYSICIAN ASSISTANT

## 2023-08-07 PROCEDURE — 81001 URINALYSIS AUTO W/SCOPE: CPT | Performed by: PHYSICIAN ASSISTANT

## 2023-08-23 ENCOUNTER — OFFICE VISIT (OUTPATIENT)
Dept: PULMONOLOGY | Facility: CLINIC | Age: 68
End: 2023-08-23
Payer: COMMERCIAL

## 2023-08-23 VITALS
HEIGHT: 70 IN | SYSTOLIC BLOOD PRESSURE: 140 MMHG | OXYGEN SATURATION: 95 % | WEIGHT: 233.4 LBS | HEART RATE: 73 BPM | DIASTOLIC BLOOD PRESSURE: 82 MMHG | BODY MASS INDEX: 33.41 KG/M2

## 2023-08-23 DIAGNOSIS — J98.4 SCARRING OF LUNG: Chronic | ICD-10-CM

## 2023-08-23 DIAGNOSIS — J44.9 COPD, GROUP C, BY GOLD 2017 CLASSIFICATION: Primary | Chronic | ICD-10-CM

## 2023-08-23 DIAGNOSIS — G47.33 OBSTRUCTIVE SLEEP APNEA: Chronic | ICD-10-CM

## 2023-08-23 DIAGNOSIS — Z57.2 OCCUPATIONAL EXPOSURE TO COAL DUST: Chronic | ICD-10-CM

## 2023-08-23 DIAGNOSIS — Z91.09 ENVIRONMENTAL ALLERGIES: ICD-10-CM

## 2023-08-23 DIAGNOSIS — Z87.891 PERSONAL HISTORY OF NICOTINE DEPENDENCE: Chronic | ICD-10-CM

## 2023-08-23 DIAGNOSIS — R91.1 LUNG NODULE: Chronic | ICD-10-CM

## 2023-08-23 PROCEDURE — 99214 OFFICE O/P EST MOD 30 MIN: CPT | Performed by: NURSE PRACTITIONER

## 2023-08-23 RX ORDER — ALBUTEROL SULFATE 90 UG/1
2 AEROSOL, METERED RESPIRATORY (INHALATION) EVERY 4 HOURS PRN
Qty: 8 G | Refills: 5 | Status: SHIPPED | OUTPATIENT
Start: 2023-08-23 | End: 2023-09-22

## 2023-08-23 RX ORDER — AZELASTINE 1 MG/ML
2 SPRAY, METERED NASAL 2 TIMES DAILY
Qty: 1 EACH | Refills: 5 | Status: SHIPPED | OUTPATIENT
Start: 2023-08-23 | End: 2023-09-22

## 2023-08-23 SDOH — HEALTH STABILITY - PHYSICAL HEALTH: OCCUPATIONAL EXPOSURE TO DUST: Z57.2

## 2023-11-07 ENCOUNTER — LAB (OUTPATIENT)
Dept: LAB | Facility: HOSPITAL | Age: 68
End: 2023-11-07
Payer: MEDICARE

## 2023-11-07 DIAGNOSIS — R79.89 LOW TESTOSTERONE IN MALE: ICD-10-CM

## 2023-11-07 LAB
HCT VFR BLD AUTO: 44.9 % (ref 37.5–51)
HGB BLD-MCNC: 14.8 G/DL (ref 13–17.7)

## 2023-11-07 PROCEDURE — 84403 ASSAY OF TOTAL TESTOSTERONE: CPT

## 2023-11-07 PROCEDURE — 85018 HEMOGLOBIN: CPT

## 2023-11-07 PROCEDURE — 84402 ASSAY OF FREE TESTOSTERONE: CPT

## 2023-11-07 PROCEDURE — 36415 COLL VENOUS BLD VENIPUNCTURE: CPT

## 2023-11-07 PROCEDURE — 85014 HEMATOCRIT: CPT

## 2023-11-10 LAB
TESTOST FREE SERPL-MCNC: 1.2 PG/ML (ref 6.6–18.1)
TESTOST SERPL-MCNC: 140 NG/DL (ref 264–916)

## 2023-11-21 ENCOUNTER — TELEPHONE (OUTPATIENT)
Dept: PULMONOLOGY | Facility: CLINIC | Age: 68
End: 2023-11-21
Payer: COMMERCIAL

## 2023-11-21 DIAGNOSIS — J44.1 COPD WITH ACUTE EXACERBATION: Primary | ICD-10-CM

## 2023-11-21 RX ORDER — PREDNISONE 10 MG/1
TABLET ORAL
Qty: 31 TABLET | Refills: 0 | Status: SHIPPED | OUTPATIENT
Start: 2023-11-21

## 2023-11-21 NOTE — TELEPHONE ENCOUNTER
Patient having increased shortness of breath, he states it seems to have changed when the weather changed.  He is not having any other symptoms, no cough, no fever, chills or body aches.  Patient is taking breztri as prescribed as well as albuterol hfa.  Patient uses [facility].   No known allergies.

## 2023-11-21 NOTE — TELEPHONE ENCOUNTER
Hub staff attempted to follow warm transfer process and was unsuccessful     Caller: Roc Rees    Relationship to patient: Self    Best call back number: [phone]    Patient is needing:WOULD LIKE A CALL BK FROM A NURSE REGARDING A PRESCRIPTION BEING CALLED IN. PLEASE CALL PT TO ADVISE.

## 2023-11-27 DIAGNOSIS — J44.1 COPD WITH ACUTE EXACERBATION: Primary | ICD-10-CM

## 2023-11-27 DIAGNOSIS — J44.9 COPD, GROUP C, BY GOLD 2017 CLASSIFICATION: ICD-10-CM

## 2023-11-27 RX ORDER — ALBUTEROL SULFATE 2.5 MG/3ML
2.5 SOLUTION RESPIRATORY (INHALATION) 4 TIMES DAILY PRN
Qty: 360 ML | Refills: 5 | Status: SHIPPED | OUTPATIENT
Start: 2023-11-27

## 2023-11-27 NOTE — TELEPHONE ENCOUNTER
Received a request from [facility] for refills on albuterol nebs.    Rx Refill Note  Requested Prescriptions     Pending Prescriptions Disp Refills    albuterol (PROVENTIL) (2.5 MG/3ML) 0.083% nebulizer solution 360 mL 5     Sig: Take 2.5 mg by nebulization 4 (Four) Times a Day As Needed for Wheezing. COPD J44.9      Last office visit with prescribing clinician: 8/23/2023   Last telemedicine visit with prescribing clinician: Visit date not found   Next office visit with prescribing clinician: 2/26/2024                         Would you like a call back once the refill request has been completed: [] Yes [] No    If the office needs to give you a call back, can they leave a voicemail: [] Yes [] No    Rabia Ledezma MA  11/27/23, 10:29 CST

## 2024-01-23 PROBLEM — J44.9 COPD, GROUP C, BY GOLD 2017 CLASSIFICATION: Chronic | Status: RESOLVED | Noted: 2021-08-10 | Resolved: 2024-01-23

## 2024-01-23 NOTE — PROGRESS NOTES
Chief Complaint  COPD    Subjective    History of Present Illness {CC  Problem List  Visit Diagnosis   Encounters  Notes  Medications  Labs  Result Review Imaging  Media: 23}    Roc Rees presents to [facility] for:    History of Present Illness  Management of COPD and lung scarring. His alpha-1 testing is normal. Most recent FEV1 was 35. He is obese. He is a former smoker. CT January 2023 showing a stable 4 mm nodule right upper lobe and stable scarring lingula, right middle lobe and right lower lobe. 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 He has not worked in the industry since.      He is on Breztri after failing Breo.  He has a nebulizer he can use when needed.  He does use it fairly regularly with good benefit.  He was started on 2 L of nocturnal oxygen after hospitalization.  He is uncertain if it is helping or not but he is compliant with it.  He did not want a sleep study.      Allergies are doing better on the azelastine.    He recently called in for worsening respiratory complaints.  The on-call provider ordered chest x-ray and viral testing.  Chest x-ray showing chronic changes.  Viral testing showed rhinovirus.  He did improve with the steroids.  He feels he is back to his baseline almost.        Prior to Admission medications    Medication Sig Start Date End Date Taking? Authorizing Provider   albuterol (PROVENTIL) (2.5 MG/3ML) 0.083% nebulizer solution Take 2.5 mg by nebulization 4 (Four) Times a Day As Needed for Wheezing. COPD J44.9 11/27/23   Bernadette Sparks APRN   albuterol sulfate  (90 Base) MCG/ACT inhaler Inhale 2 puffs Every 4 (Four) Hours As Needed for Wheezing or Shortness of Air. 5/17/21   Roc Morrison MD   aspirin 81 MG EC tablet Take 1 tablet by mouth Daily.    Provider,  "MD Alan Snow Aerosphere 160-9-4.8 MCG/ACT aerosol inhaler INHALE 2 PUFFS TWICE DAILY. 5/1/23   Bernadette Sparks APRN   guaiFENesin (MUCINEX) 600 MG 12 hr tablet Take 2 tablets by mouth Every 12 (Twelve) Hours. 7/5/23   Julieth Anderson APRN   hydroCHLOROthiazide (MICROZIDE) 12.5 MG capsule Take 1 capsule by mouth Every Morning. 6/26/21   ProviderGwendolyn MD   losartan (COZAAR) 100 MG tablet Take 1 tablet by mouth Daily. 7/23/21   Gwendolyn La MD   montelukast (SINGULAIR) 10 MG tablet Take 1 tablet by mouth Every Night. 7/5/23   Julieth Anderson APRN   predniSONE (DELTASONE) 10 MG tablet Take 4 tabs daily x 3 days, then take 3 tabs daily x 3 days, then take 2 tabs daily x 3 days, then take 1 tab daily x 3 days 11/21/23   Bernadette Sparks APRN   vardenafil (LEVITRA) 20 MG tablet Take 1 tablet by mouth Daily As Needed for Erectile Dysfunction.    Provider, MD Gwendolyn       Social History     Socioeconomic History    Marital status:    Tobacco Use    Smoking status: Former     Packs/day: 1.00     Years: 38.00     Additional pack years: 0.00     Total pack years: 38.00     Types: Cigarettes     Passive exposure: Past    Smokeless tobacco: Never   Vaping Use    Vaping Use: Never used   Substance and Sexual Activity    Alcohol use: No    Drug use: No    Sexual activity: Defer       Objective   Vital Signs:   /84   Pulse 68   Ht 175.3 cm (69\")   Wt 110 kg (243 lb)   SpO2 98% Comment: RA  BMI 35.88 kg/m²     Physical Exam  Constitutional:       Appearance: He is obese.   Cardiovascular:      Rate and Rhythm: Normal rate and regular rhythm.      Heart sounds: No murmur heard.  Pulmonary:      Effort: Pulmonary effort is normal.      Breath sounds: Normal breath sounds.   Musculoskeletal:      Right lower leg: No edema.      Left lower leg: No edema.      Comments: Right lower extremity amputee   Neurological:      Mental Status: He is alert and oriented to person, place, " and time.        Result Review :    PFT Values          8/22/2022    10:45 2/26/2024    09:15   Pre Drug PFT Results   FVC 64 61   FEV1 35 35   FEF 25-75% 11 12   FEV1/FVC 43 44   Other Tests PFT Results   DLCO 84 67   D/VAsb 105 91     My interpretation of the PFT : none    Results for orders placed in visit on 02/26/24    Spirometry with Diffusion Capacity    Narrative  Spirometry with Diffusion Capacity    Performed by: Brandy Ruth, RRT  Authorized by: Bernadette Sparks APRN  Pre Drug % Predicted  FVC: 61%  FEV1: 35%  FEF 25-75%: 12%  FEV1/FVC: 44%  DLCO: 67%  D/VAsb: 91%    Interpretation  Spirometry  Spirometry shows severe obstruction. There is reduced midflow suggesting small airway/airflow obstruction.  Diffusion Capacity  The patient's diffusion capacity is moderately reduced.  Diffusion capacity is normal when corrected for alveolar volume.      Results for orders placed in visit on 08/22/22    Pulmonary Function Test    Narrative  Pulmonary Function Test  Performed by: Brandy Ruth, RRT  Authorized by: Bernadette Sparks APRN    Pre Drug % Predicted  FVC: 64%  FEV1: 35%  FEF 25-75%: 11%  FEV1/FVC: 43%  DLCO: 84%  D/VAsb: 105%    Interpretation  Spirometry  Spirometry shows severe obstruction. There is reduced midflow suggesting small airway/airflow obstruction.  Review of FVL curve  There is a flattening of the expiratory curve, suggesting variable intrathoracic obstruction.  Diffusion Capacity  The patient's diffusion capacity is normal.  Diffusion capacity is normal when corrected for alveolar volume.      Results for orders placed during the hospital encounter of 09/11/19    Full Pulmonary Function Test With Bronchodilator    Narrative  [facility] - Pulmonary Function Test    [address] [place] [address]  [place]  [address]  [phone]    Patient : Roc Rees  MRN : [id number]  CSN : [id number]  Pulmonologist : Marv Graves MD  Date :  9/12/2019    ______________________________________________________________________    Interpretation :  1.  Spirometry is consistent with a severe obstructive ventilatory defect.  2.  Lung volumes reveal hyperinflation.  There is also a mild decrease in vital capacity second to obstruction and a mild decrease in inspiratory capacity.  3.  Diffusion capacity is within normal limits particularly when corrected for alveolar volume.      Marv Graves MD    XR Chest 2 View (02/12/2024 13:21) CT Chest Low Dose Cancer Screening WO (02/20/2024 13:51)     My interpretation of imaging: No pneumonia or suspicious nodules, stable minimal scarring    Respiratory Panel PCR w/COVID-19(SARS-CoV-2) [facility] In-House, NP Swab in UTM/VTM, 2 HR TAT - Swab, Nasopharynx (02/12/2024 13:17)     My interpretation of labs: Positive for rhinovirus      Assessment and Plan {CC Problem List  Visit Diagnosis  ROS  Review (Popup)  Health Maintenance  Quality  BestPractice  Medications  SmartSets  SnapShot Encounters  Media : 23}    Diagnoses and all orders for this visit:    1. Stage 3 severe COPD by GOLD classification (Primary)  Comments:  Continue Breztri and mucolytic's.  Use breathing treatments as needed.  PFTs stable  Orders:  -     Spirometry with Diffusion Capacity    2. Scarring of lung  Comments:  Stable on CT imaging.  Monitor.  PFTs stable    3. Obstructive sleep apnea  Comments:  He is on 2 L of oxygen with sleep.  He is compliant with this and benefiting from it.  Did not want titration    4. Personal history of nicotine dependence  Comments:  Continue to avoid smoking.  Low-dose CT stable.  Repeat February 2025 per guidelines    5. Occupational exposure to coal dust  Comments:  Annual imaging and PFTs already being arranged    6. Class 2 severe obesity due to excess calories with serious comorbidity and body mass index (BMI) of 35.0 to 35.9 in adult  Comments:  Contributes to underlying health  complaints.  Education material provided        Body mass index is 35.88 kg/m². Educational material provided after visit summary about elevated BMI      Bernadette Sparks, APRN  2/26/2024  09:50 CST    Follow Up   Return in about 6 months (around 8/26/2024).    Patient was given instructions and counseling regarding his condition or for health maintenance advice. Please see specific information pulled into the AVS if appropriate.

## 2024-02-12 ENCOUNTER — TELEPHONE (OUTPATIENT)
Dept: PULMONOLOGY | Facility: CLINIC | Age: 69
End: 2024-02-12
Payer: COMMERCIAL

## 2024-02-12 ENCOUNTER — LAB (OUTPATIENT)
Dept: LAB | Facility: HOSPITAL | Age: 69
End: 2024-02-12
Payer: MEDICARE

## 2024-02-12 ENCOUNTER — HOSPITAL ENCOUNTER (OUTPATIENT)
Dept: GENERAL RADIOLOGY | Facility: HOSPITAL | Age: 69
Discharge: HOME | End: 2024-02-12
Payer: COMMERCIAL

## 2024-02-12 DIAGNOSIS — J44.1 COPD WITH ACUTE EXACERBATION: Primary | ICD-10-CM

## 2024-02-12 DIAGNOSIS — J44.9 STAGE 3 SEVERE COPD BY GOLD CLASSIFICATION: Chronic | ICD-10-CM

## 2024-02-12 DIAGNOSIS — J44.1 COPD WITH ACUTE EXACERBATION: ICD-10-CM

## 2024-02-12 LAB
B PARAPERT DNA SPEC QL NAA+PROBE: NOT DETECTED
B PERT DNA SPEC QL NAA+PROBE: NOT DETECTED
C PNEUM DNA NPH QL NAA+NON-PROBE: NOT DETECTED
FLUAV SUBTYP SPEC NAA+PROBE: NOT DETECTED
FLUBV RNA ISLT QL NAA+PROBE: NOT DETECTED
HADV DNA SPEC NAA+PROBE: NOT DETECTED
HCOV 229E RNA SPEC QL NAA+PROBE: NOT DETECTED
HCOV HKU1 RNA SPEC QL NAA+PROBE: NOT DETECTED
HCOV NL63 RNA SPEC QL NAA+PROBE: NOT DETECTED
HCOV OC43 RNA SPEC QL NAA+PROBE: NOT DETECTED
HMPV RNA NPH QL NAA+NON-PROBE: NOT DETECTED
HPIV1 RNA ISLT QL NAA+PROBE: NOT DETECTED
HPIV2 RNA SPEC QL NAA+PROBE: NOT DETECTED
HPIV3 RNA NPH QL NAA+PROBE: NOT DETECTED
HPIV4 P GENE NPH QL NAA+PROBE: NOT DETECTED
M PNEUMO IGG SER IA-ACNC: NOT DETECTED
RHINOVIRUS RNA SPEC NAA+PROBE: DETECTED
RSV RNA NPH QL NAA+NON-PROBE: NOT DETECTED
SARS-COV-2 RNA NPH QL NAA+NON-PROBE: NOT DETECTED

## 2024-02-12 PROCEDURE — 71046 X-RAY EXAM CHEST 2 VIEWS: CPT

## 2024-02-12 PROCEDURE — 0202U NFCT DS 22 TRGT SARS-COV-2: CPT

## 2024-02-12 RX ORDER — CEFDINIR 300 MG/1
300 CAPSULE ORAL 2 TIMES DAILY
Qty: 14 CAPSULE | Refills: 0 | Status: SHIPPED | OUTPATIENT
Start: 2024-02-12 | End: 2024-02-19

## 2024-02-12 RX ORDER — PREDNISONE 10 MG/1
TABLET ORAL
Qty: 31 TABLET | Refills: 0 | Status: SHIPPED | OUTPATIENT
Start: 2024-02-12

## 2024-02-20 ENCOUNTER — HOSPITAL ENCOUNTER (OUTPATIENT)
Dept: CT IMAGING | Facility: HOSPITAL | Age: 69
Discharge: HOME | End: 2024-02-20
Admitting: NURSE PRACTITIONER
Payer: MEDICARE

## 2024-02-20 DIAGNOSIS — Z87.891 PERSONAL HISTORY OF NICOTINE DEPENDENCE: Chronic | ICD-10-CM

## 2024-02-20 PROCEDURE — 71271 CT THORAX LUNG CANCER SCR C-: CPT

## 2024-02-22 DIAGNOSIS — N52.9 ERECTILE DYSFUNCTION, UNSPECIFIED ERECTILE DYSFUNCTION TYPE: ICD-10-CM

## 2024-02-22 DIAGNOSIS — N52.9 IMPOTENCE OF ORGANIC ORIGIN: Primary | ICD-10-CM

## 2024-02-22 RX ORDER — VARDENAFIL HYDROCHLORIDE 20 MG/1
20 TABLET ORAL DAILY PRN
Qty: 30 TABLET | Refills: 5 | Status: SHIPPED | OUTPATIENT
Start: 2024-02-22

## 2024-02-26 ENCOUNTER — OFFICE VISIT (OUTPATIENT)
Dept: PULMONOLOGY | Facility: CLINIC | Age: 69
End: 2024-02-26
Payer: COMMERCIAL

## 2024-02-26 ENCOUNTER — PROCEDURE VISIT (OUTPATIENT)
Dept: PULMONOLOGY | Facility: CLINIC | Age: 69
End: 2024-02-26
Payer: COMMERCIAL

## 2024-02-26 VITALS
BODY MASS INDEX: 35.99 KG/M2 | WEIGHT: 243 LBS | DIASTOLIC BLOOD PRESSURE: 84 MMHG | SYSTOLIC BLOOD PRESSURE: 130 MMHG | HEART RATE: 68 BPM | HEIGHT: 69 IN | OXYGEN SATURATION: 98 %

## 2024-02-26 DIAGNOSIS — G47.33 OBSTRUCTIVE SLEEP APNEA: Chronic | ICD-10-CM

## 2024-02-26 DIAGNOSIS — Z57.2 OCCUPATIONAL EXPOSURE TO COAL DUST: Chronic | ICD-10-CM

## 2024-02-26 DIAGNOSIS — E66.01 CLASS 2 SEVERE OBESITY DUE TO EXCESS CALORIES WITH SERIOUS COMORBIDITY AND BODY MASS INDEX (BMI) OF 35.0 TO 35.9 IN ADULT: ICD-10-CM

## 2024-02-26 DIAGNOSIS — J44.9 STAGE 3 SEVERE COPD BY GOLD CLASSIFICATION: Primary | Chronic | ICD-10-CM

## 2024-02-26 DIAGNOSIS — J44.9 STAGE 3 SEVERE COPD BY GOLD CLASSIFICATION: Primary | ICD-10-CM

## 2024-02-26 DIAGNOSIS — Z87.891 PERSONAL HISTORY OF NICOTINE DEPENDENCE: Chronic | ICD-10-CM

## 2024-02-26 DIAGNOSIS — J98.4 SCARRING OF LUNG: Chronic | ICD-10-CM

## 2024-02-26 PROCEDURE — 94010 BREATHING CAPACITY TEST: CPT | Performed by: NURSE PRACTITIONER

## 2024-02-26 PROCEDURE — 94729 DIFFUSING CAPACITY: CPT | Performed by: NURSE PRACTITIONER

## 2024-02-26 PROCEDURE — 99214 OFFICE O/P EST MOD 30 MIN: CPT | Performed by: NURSE PRACTITIONER

## 2024-02-26 RX ORDER — ATORVASTATIN CALCIUM 10 MG/1
1 TABLET, FILM COATED ORAL DAILY
COMMUNITY
Start: 2024-01-18 | End: 2024-04-17

## 2024-02-26 RX ORDER — AMLODIPINE BESYLATE 2.5 MG/1
1 TABLET ORAL DAILY
COMMUNITY
Start: 2024-01-18

## 2024-02-26 SDOH — HEALTH STABILITY - PHYSICAL HEALTH: OCCUPATIONAL EXPOSURE TO DUST: Z57.2

## 2024-02-26 NOTE — PROCEDURES
Spirometry with Diffusion Capacity    Performed by: Brandy Ruth, RRT  Authorized by: Bernadette Sparks APRN     Pre Drug % Predicted    FVC: 61%   FEV1: 35%   FEF 25-75%: 12%   FEV1/FVC: 44%   DLCO: 67%   D/VAsb: 91%    Interpretation   Spirometry   Spirometry shows severe obstruction. There is reduced midflow suggesting small airway/airflow obstruction.   Diffusion Capacity  The patient's diffusion capacity is moderately reduced.  Diffusion capacity is normal when corrected for alveolar volume.

## 2024-03-01 ENCOUNTER — TELEPHONE (OUTPATIENT)
Dept: VASCULAR SURGERY | Facility: CLINIC | Age: 69
End: 2024-03-01
Payer: COMMERCIAL

## 2024-03-04 ENCOUNTER — HOSPITAL ENCOUNTER (OUTPATIENT)
Dept: ULTRASOUND IMAGING | Facility: HOSPITAL | Age: 69
Discharge: HOME | End: 2024-03-04
Payer: COMMERCIAL

## 2024-03-04 ENCOUNTER — OFFICE VISIT (OUTPATIENT)
Dept: VASCULAR SURGERY | Facility: CLINIC | Age: 69
End: 2024-03-04
Payer: COMMERCIAL

## 2024-03-04 VITALS
DIASTOLIC BLOOD PRESSURE: 78 MMHG | HEART RATE: 76 BPM | OXYGEN SATURATION: 97 % | SYSTOLIC BLOOD PRESSURE: 122 MMHG | WEIGHT: 240 LBS | BODY MASS INDEX: 34.36 KG/M2 | HEIGHT: 70 IN

## 2024-03-04 DIAGNOSIS — I73.9 PAD (PERIPHERAL ARTERY DISEASE): Primary | ICD-10-CM

## 2024-03-04 DIAGNOSIS — I10 ESSENTIAL HYPERTENSION: ICD-10-CM

## 2024-03-04 DIAGNOSIS — I65.23 BILATERAL CAROTID ARTERY STENOSIS: ICD-10-CM

## 2024-03-04 DIAGNOSIS — I87.322 CHRONIC VENOUS HYPERTENSION WITH INFLAMMATION INVOLVING LEFT SIDE: ICD-10-CM

## 2024-03-04 DIAGNOSIS — I73.9 PAD (PERIPHERAL ARTERY DISEASE): ICD-10-CM

## 2024-03-04 PROCEDURE — 99214 OFFICE O/P EST MOD 30 MIN: CPT | Performed by: NURSE PRACTITIONER

## 2024-03-04 PROCEDURE — 93923 UPR/LXTR ART STDY 3+ LVLS: CPT | Performed by: SURGERY

## 2024-03-04 PROCEDURE — 93880 EXTRACRANIAL BILAT STUDY: CPT

## 2024-03-04 PROCEDURE — 93880 EXTRACRANIAL BILAT STUDY: CPT | Performed by: SURGERY

## 2024-03-04 PROCEDURE — 93923 UPR/LXTR ART STDY 3+ LVLS: CPT

## 2024-03-04 NOTE — PROGRESS NOTES
"3/4/2024        Medhat Petty,    [address] [place] [address]    Roc Rees  1955    Chief Complaint   Patient presents with    Follow-up     1 year follow up w/ testing. Last seen 3/24/23. Patient denies any stroke type symptoms or changes in leg.        Dear Medhat Petty,         HPI  I had the pleasure of seeing your patient Roc Rees in the office today.  As you recall, Roc Rees is a 68 y.o.  male who we are following for venous insufficiency and carotid occlusive disease.  He does have a history of right below-knee amputation following trauma.   He has previously undergone greater saphenous vein stripped from the knee to the ankle in an effort to save his right lower extremity.  He did undergo endovenous ablation of the left lower extremity with venaseal on 10/12/2020.  Currently he is doing well and denies any strokelike symptoms or claudication to his lower extremities.  He is maintained on aspirin and Lipitor.  He did have noninvasive testing performed today, which showed did review in office.        Review of Systems   Constitutional: Negative.    HENT: Negative.     Eyes: Negative.    Respiratory: Negative.     Cardiovascular:  Positive for leg swelling.   Gastrointestinal: Negative.    Endocrine: Negative.    Genitourinary: Negative.    Musculoskeletal:  Positive for gait problem.   Skin:  Positive for color change.   Allergic/Immunologic: Negative.    Hematological: Negative.    Psychiatric/Behavioral: Negative.     All other systems reviewed and are negative.       /78   Pulse 76   Ht 177.8 cm (70\")   Wt 109 kg (240 lb)   SpO2 97%   BMI 34.44 kg/m²      Physical Exam  Vitals and nursing note reviewed.   Constitutional:       General: He is not in acute distress.     Appearance: Normal appearance. He is well-developed. He is obese. He is not diaphoretic.   HENT:      Head: Normocephalic and atraumatic.   Eyes:      Pupils: Pupils are equal, round, and reactive to light. "   Neck:      Vascular: No carotid bruit or JVD.   Cardiovascular:      Rate and Rhythm: Normal rate and regular rhythm.      Pulses:           Femoral pulses are 2+ on the right side and 2+ on the left side.       Popliteal pulses are 2+ on the right side and 2+ on the left side.        Dorsalis pedis pulses are 2+ on the left side.        Posterior tibial pulses are 2+ on the left side.      Heart sounds: Normal heart sounds, S1 normal and S2 normal. No murmur heard.     No friction rub. No gallop.      Comments: Right BKA, varicose veins of the left lower extremity  Pulmonary:      Effort: Pulmonary effort is normal.      Breath sounds: Normal breath sounds.   Abdominal:      General: Bowel sounds are normal. There is no abdominal bruit.      Palpations: Abdomen is soft.      Tenderness: There is no abdominal tenderness.   Musculoskeletal:         General: Deformity (right BKA) present. Normal range of motion.      Right Lower Extremity: Right leg is amputated below knee.   Skin:     General: Skin is warm and dry.      Findings: Rash present.   Neurological:      General: No focal deficit present.      Mental Status: He is alert and oriented to person, place, and time.      Cranial Nerves: No cranial nerve deficit.   Psychiatric:         Mood and Affect: Mood normal.         Behavior: Behavior normal.         Thought Content: Thought content normal.         Judgment: Judgment normal.     Diagnostic data:  Noninvasive testing including ABIs show ISABELA of 1.26 indicating no arterial insufficiency to his left lower extremity.  Carotid duplex shows less than 50% carotid stenosis bilaterally with bilateral antegrade vertebral flow.       Patient Active Problem List   Diagnosis    BPH (benign prostatic hypertrophy) with urinary obstruction    Impotence due to erectile dysfunction    Hypogonadism in male    Erectile dysfunction    Benign prostatic hyperplasia with lower urinary tract symptoms    Rotator cuff arthropathy of  right shoulder    Primary osteoarthritis of right shoulder    Microscopic hematuria    Renal stone    Hydronephrosis with renal and ureteral calculus obstruction    Ureteral stone    Renal cyst    Ureteral stone with hydronephrosis    Essential hypertension    Chronic venous hypertension with inflammation involving left side    Stage 3 severe COPD by GOLD classification    Personal history of nicotine dependence    Scarring of lung    Occupational exposure to coal dust    Class 2 severe obesity due to excess calories with serious comorbidity and body mass index (BMI) of 37.0 to 37.9 in adult    Lung nodule    Colorectal polyps    Obstructive sleep apnea    Elevated troponin level not due myocardial infarction         ICD-10-CM ICD-9-CM   1. PAD (peripheral artery disease)  I73.9 443.9   2. Bilateral carotid artery stenosis  I65.23 433.10     433.30   3. Chronic venous hypertension with inflammation involving left side  I87.322 459.32   4. Essential hypertension  I10 401.9           Plan: After thoroughly evaluating Roc Rees, I believe the best course of action is to remain conservative from vascular surgery standpoint.  Currently he is doing well and denies any claudication to his left lower extremity.  He is having no problems with his right below-knee amputation.  I did review his testing which shows no arterial insufficiency to his left lower extremity and carotid duplex shows less than 50% stenosis bilaterally.  We will see him back in 1 year with repeat noninvasive testing including ABIs and a carotid duplex.  I did discuss vascular risk factors as they pertain to the progression of vascular disease including controlling his hypertension.   His blood pressure stable on his current medications.  This was all discussed in full with complete understanding.    Thank you for allowing me to participate in the care of your patient.  Please do not hesitate with any questions or concerns.  I will keep you aware of  any further encounters with Roc Rees.        Sincerely yours,         GALINA Jenkins

## 2024-03-04 NOTE — LETTER
March 4, 2024     Medhat Petty DO  [address] [place] [address]    Patient: Roc Rees   YOB: 1955   Date of Visit: 3/4/2024     Dear Medhat Petty DO:       Thank you for referring Roc Rees to me for evaluation. Below are the relevant portions of my assessment and plan of care.    If you have questions, please do not hesitate to call me. I look forward to following Roc along with you.         Sincerely,        GALINA Jenkins        CC: No Recipients    Felicia Tinoco APRN  03/04/24 1034  Sign when Signing Visit  3/4/2024        Medhat Petty DO   [address] [place] [address]    Roc Rees  1955    Chief Complaint   Patient presents with   • Follow-up     1 year follow up w/ testing. Last seen 3/24/23. Patient denies any stroke type symptoms or changes in leg.        Dear Medhat Petty DO        HPI  I had the pleasure of seeing your patient Roc Rees in the office today.  As you recall, Roc Rees is a 68 y.o.  male who we are following for venous insufficiency and carotid occlusive disease.  He does have a history of right below-knee amputation following trauma.   He has previously undergone greater saphenous vein stripped from the knee to the ankle in an effort to save his right lower extremity.  He did undergo endovenous ablation of the left lower extremity with venaseal on 10/12/2020.  Currently he is doing well and denies any strokelike symptoms or claudication to his lower extremities.  He is maintained on aspirin and Lipitor.  He did have noninvasive testing performed today, which showed did review in office.        Review of Systems   Constitutional: Negative.    HENT: Negative.     Eyes: Negative.    Respiratory: Negative.     Cardiovascular:  Positive for leg swelling.   Gastrointestinal: Negative.    Endocrine: Negative.    Genitourinary: Negative.    Musculoskeletal:  Positive for gait problem.   Skin:  Positive for color change.  "  Allergic/Immunologic: Negative.    Hematological: Negative.    Psychiatric/Behavioral: Negative.     All other systems reviewed and are negative.       /78   Pulse 76   Ht 177.8 cm (70\")   Wt 109 kg (240 lb)   SpO2 97%   BMI 34.44 kg/m²      Physical Exam  Vitals and nursing note reviewed.   Constitutional:       General: He is not in acute distress.     Appearance: Normal appearance. He is well-developed. He is obese. He is not diaphoretic.   HENT:      Head: Normocephalic and atraumatic.   Eyes:      Pupils: Pupils are equal, round, and reactive to light.   Neck:      Vascular: No carotid bruit or JVD.   Cardiovascular:      Rate and Rhythm: Normal rate and regular rhythm.      Pulses:           Femoral pulses are 2+ on the right side and 2+ on the left side.       Popliteal pulses are 2+ on the right side and 2+ on the left side.        Dorsalis pedis pulses are 2+ on the left side.        Posterior tibial pulses are 2+ on the left side.      Heart sounds: Normal heart sounds, S1 normal and S2 normal. No murmur heard.     No friction rub. No gallop.      Comments: Right BKA, varicose veins of the left lower extremity  Pulmonary:      Effort: Pulmonary effort is normal.      Breath sounds: Normal breath sounds.   Abdominal:      General: Bowel sounds are normal. There is no abdominal bruit.      Palpations: Abdomen is soft.      Tenderness: There is no abdominal tenderness.   Musculoskeletal:         General: Deformity (right BKA) present. Normal range of motion.      Right Lower Extremity: Right leg is amputated below knee.   Skin:     General: Skin is warm and dry.      Findings: Rash present.   Neurological:      General: No focal deficit present.      Mental Status: He is alert and oriented to person, place, and time.      Cranial Nerves: No cranial nerve deficit.   Psychiatric:         Mood and Affect: Mood normal.         Behavior: Behavior normal.         Thought Content: Thought content " normal.         Judgment: Judgment normal.     Diagnostic data:  Noninvasive testing including ABIs show ISABELA of 1.26 indicating no arterial insufficiency to his left lower extremity.  Carotid duplex shows less than 50% carotid stenosis bilaterally with bilateral antegrade vertebral flow.       Patient Active Problem List   Diagnosis   • BPH (benign prostatic hypertrophy) with urinary obstruction   • Impotence due to erectile dysfunction   • Hypogonadism in male   • Erectile dysfunction   • Benign prostatic hyperplasia with lower urinary tract symptoms   • Rotator cuff arthropathy of right shoulder   • Primary osteoarthritis of right shoulder   • Microscopic hematuria   • Renal stone   • Hydronephrosis with renal and ureteral calculus obstruction   • Ureteral stone   • Renal cyst   • Ureteral stone with hydronephrosis   • Essential hypertension   • Chronic venous hypertension with inflammation involving left side   • Stage 3 severe COPD by GOLD classification   • Personal history of nicotine dependence   • Scarring of lung   • Occupational exposure to coal dust   • Class 2 severe obesity due to excess calories with serious comorbidity and body mass index (BMI) of 37.0 to 37.9 in adult   • Lung nodule   • Colorectal polyps   • Obstructive sleep apnea   • Elevated troponin level not due myocardial infarction         ICD-10-CM ICD-9-CM   1. PAD (peripheral artery disease)  I73.9 443.9   2. Bilateral carotid artery stenosis  I65.23 433.10     433.30   3. Chronic venous hypertension with inflammation involving left side  I87.322 459.32   4. Essential hypertension  I10 401.9           Plan: After thoroughly evaluating Roc Rees, I believe the best course of action is to remain conservative from vascular surgery standpoint.  Currently he is doing well and denies any claudication to his left lower extremity.  He is having no problems with his right below-knee amputation.  I did review his testing which shows no arterial  insufficiency to his left lower extremity and carotid duplex shows less than 50% stenosis bilaterally.  We will see him back in 1 year with repeat noninvasive testing including ABIs and a carotid duplex.  I did discuss vascular risk factors as they pertain to the progression of vascular disease including controlling his hypertension.   His blood pressure stable on his current medications.  This was all discussed in full with complete understanding.    Thank you for allowing me to participate in the care of your patient.  Please do not hesitate with any questions or concerns.  I will keep you aware of any further encounters with Roc Rees.        Sincerely yours,         GALINA Jenkins

## 2024-03-07 DIAGNOSIS — J44.9 STAGE 3 SEVERE COPD BY GOLD CLASSIFICATION: Primary | ICD-10-CM

## 2024-03-07 RX ORDER — ALBUTEROL SULFATE 90 UG/1
2 AEROSOL, METERED RESPIRATORY (INHALATION) EVERY 4 HOURS PRN
Qty: 18 G | Refills: 2 | Status: SHIPPED | OUTPATIENT
Start: 2024-03-07

## 2024-03-07 NOTE — TELEPHONE ENCOUNTER
Received a fax from [facility] for refills on albuterol HFA.    Per protocol no cosign is required, script sent to the pharmacy.    Rx Refill Note  Requested Prescriptions     Pending Prescriptions Disp Refills    albuterol sulfate  (90 Base) MCG/ACT inhaler 18 g 2     Sig: Inhale 2 puffs Every 4 (Four) Hours As Needed for Wheezing or Shortness of Air.      Last office visit with prescribing clinician: 2/26/2024   Last telemedicine visit with prescribing clinician: Visit date not found   Next office visit with prescribing clinician: 8/26/2024                         Would you like a call back once the refill request has been completed: [] Yes [] No    If the office needs to give you a call back, can they leave a voicemail: [] Yes [] No    Rabia Ledezma MA  03/07/24, 10:12 CST

## 2024-03-25 ENCOUNTER — TELEPHONE (OUTPATIENT)
Dept: PULMONOLOGY | Facility: CLINIC | Age: 69
End: 2024-03-25
Payer: COMMERCIAL

## 2024-03-25 DIAGNOSIS — J44.1 COPD WITH ACUTE EXACERBATION: Primary | ICD-10-CM

## 2024-03-25 RX ORDER — PREDNISONE 20 MG/1
40 TABLET ORAL DAILY
Qty: 10 TABLET | Refills: 0 | Status: SHIPPED | OUTPATIENT
Start: 2024-03-25 | End: 2024-03-30

## 2024-03-25 NOTE — TELEPHONE ENCOUNTER
Patient called stating that he is coughing and having some increased shortness of breath.  He is coughing up clear sputum.  He has not had a fever.  He is taking Breztri, abluterol HFA as needed and montelukast 10mg.  [facility] uses [facility].

## 2024-03-25 NOTE — TELEPHONE ENCOUNTER
Prednisone sent. He should consider starting another OTC antihistamine or allergy nasal spray until spring allergy season is over.

## 2024-05-13 RX ORDER — AZELASTINE 1 MG/ML
2 SPRAY, METERED NASAL 2 TIMES DAILY
Qty: 30 ML | Refills: 5 | Status: SHIPPED | OUTPATIENT
Start: 2024-05-13

## 2024-05-13 NOTE — TELEPHONE ENCOUNTER
Received a fax from [facility] requesting refills of astelin nasal spray.    Rx Refill Note  Requested Prescriptions     Pending Prescriptions Disp Refills    azelastine (ASTELIN) 0.1 % nasal spray 30 mL 5     Si sprays into the nostril(s) as directed by provider 2 (Two) Times a Day. Use in each nostril as directed      Last office visit with prescribing clinician: 2024   Last telemedicine visit with prescribing clinician: Visit date not found   Next office visit with prescribing clinician: 2024                         Would you like a call back once the refill request has been completed: [] Yes [] No    If the office needs to give you a call back, can they leave a voicemail: [] Yes [] No    Rabia Ledezma MA  24, 14:05 CDT

## 2024-05-15 DIAGNOSIS — J44.9 COPD, GROUP C, BY GOLD 2017 CLASSIFICATION: ICD-10-CM

## 2024-05-15 RX ORDER — BUDESONIDE, GLYCOPYRROLATE, AND FORMOTEROL FUMARATE 160; 9; 4.8 UG/1; UG/1; UG/1
2 AEROSOL, METERED RESPIRATORY (INHALATION) 2 TIMES DAILY
Qty: 10.7 G | Refills: 11 | Status: SHIPPED | OUTPATIENT
Start: 2024-05-15

## 2024-05-15 NOTE — TELEPHONE ENCOUNTER
We received a fax from [facility] for refills on Breztri.  The last few scripts we have sent for the patient went to [facility], so I called to double check with the patient.  He stated he does want this to go to [facility].    Rx Refill Note  Requested Prescriptions     Pending Prescriptions Disp Refills    Budeson-Glycopyrrol-Formoterol (Breztri Aerosphere) 160-9-4.8 MCG/ACT aerosol inhaler 10.7 g 11     Sig: Inhale 2 puffs 2 (Two) Times a Day.      Last office visit with prescribing clinician: 2/26/2024   Last telemedicine visit with prescribing clinician: Visit date not found   Next office visit with prescribing clinician: 8/26/2024                         Would you like a call back once the refill request has been completed: [] Yes [] No    If the office needs to give you a call back, can they leave a voicemail: [] Yes [] No    Rabia Ledezma MA  05/15/24, 10:38 CDT

## 2024-06-03 ENCOUNTER — TELEPHONE (OUTPATIENT)
Dept: PULMONOLOGY | Facility: CLINIC | Age: 69
End: 2024-06-03
Payer: COMMERCIAL

## 2024-06-03 DIAGNOSIS — J01.90 ACUTE NON-RECURRENT SINUSITIS, UNSPECIFIED LOCATION: ICD-10-CM

## 2024-06-03 DIAGNOSIS — J44.1 COPD WITH ACUTE EXACERBATION: Primary | ICD-10-CM

## 2024-06-03 RX ORDER — PREDNISONE 20 MG/1
40 TABLET ORAL DAILY
Qty: 10 TABLET | Refills: 0 | Status: SHIPPED | OUTPATIENT
Start: 2024-06-03 | End: 2024-06-08

## 2024-06-03 RX ORDER — AMOXICILLIN AND CLAVULANATE POTASSIUM 875; 125 MG/1; MG/1
1 TABLET, FILM COATED ORAL 2 TIMES DAILY
Qty: 20 TABLET | Refills: 0 | Status: SHIPPED | OUTPATIENT
Start: 2024-06-03 | End: 2024-06-13

## 2024-06-03 NOTE — TELEPHONE ENCOUNTER
Per patient he states he has not felt good since the middle of last week. He states he has had head and chest congestion. He is coughing up clear to green sputum. No fever or chills.  He is taking Breztri, Albuterol nebulizer treatments 2-3 times a day, full strength Mucinex and Astelin NS.  Please advise.

## 2024-07-03 ENCOUNTER — TRANSCRIBE ORDERS (OUTPATIENT)
Dept: ADMINISTRATIVE | Facility: HOSPITAL | Age: 69
End: 2024-07-03
Payer: COMMERCIAL

## 2024-07-03 ENCOUNTER — HOSPITAL ENCOUNTER (OUTPATIENT)
Dept: ULTRASOUND IMAGING | Facility: HOSPITAL | Age: 69
Discharge: HOME | End: 2024-07-03
Admitting: NURSE PRACTITIONER
Payer: MEDICARE

## 2024-07-03 DIAGNOSIS — M79.89 SWELLING OF LIMB: ICD-10-CM

## 2024-07-03 DIAGNOSIS — M79.89 SWELLING OF LIMB: Primary | ICD-10-CM

## 2024-07-03 PROCEDURE — 93971 EXTREMITY STUDY: CPT

## 2024-07-17 PROBLEM — R91.1 LUNG NODULE: Chronic | Status: RESOLVED | Noted: 2021-08-10 | Resolved: 2024-07-17

## 2024-07-17 PROBLEM — Z91.09 ENVIRONMENTAL ALLERGIES: Chronic | Status: ACTIVE | Noted: 2024-07-17

## 2024-07-17 PROBLEM — Z91.09 ENVIRONMENTAL ALLERGIES: Status: ACTIVE | Noted: 2024-07-17

## 2024-07-17 NOTE — PROGRESS NOTES
Chief Complaint  COPD    Subjective    History of Present Illness {  Problem List  Visit Diagnosis   Encounters  Notes  Medications  Labs  Result Review Imaging  Media: 23}    Roc Rees presents to [facility] for:    History of Present Illness  Management of COPD and lung scarring. His alpha-1 testing is normal. Most recent FEV1 was 35. He is obese. He is a former smoker. CT February 2024 showing lung scarring but no suspicious nodules.  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 He has not worked in the industry since.      He is on Breztri after failing Breo.  He has a nebulizer he can use when needed.  He is wondering if there is any new therapies for COPD.  He would like to avoid having to take steroids frequently.      He was started on 2 L of nocturnal oxygen after hospitalization.  He is compliant with this and benefiting from it.  He did not want a sleep study.       Allergies are doing better on the azelastine and Singulair.       Prior to Admission medications    Medication Sig Start Date End Date Taking? Authorizing Provider   albuterol (PROVENTIL) (2.5 MG/3ML) 0.083% nebulizer solution Take 2.5 mg by nebulization 4 (Four) Times a Day As Needed for Wheezing. COPD J44.9 11/27/23   Bernadette Sparks APRN   albuterol sulfate  (90 Base) MCG/ACT inhaler Inhale 2 puffs Every 4 (Four) Hours As Needed for Wheezing or Shortness of Air. 3/7/24   Bernadette Sparks APRN   amLODIPine (NORVASC) 2.5 MG tablet Take 1 tablet by mouth Daily. 1/18/24   ProviderGwendolyn MD   aspirin 81 MG EC tablet Take 1 tablet by mouth Daily.    ProviderGwendolyn MD   azelastine (ASTELIN) 0.1 % nasal spray 2 sprays into the nostril(s) as directed by provider 2 (Two) Times a Day. Use in each nostril as directed 5/13/24    "Bernadette Sparks APRN   Budeson-Glycopyrrol-Formoterol (Breztri Aerosphere) 160-9-4.8 MCG/ACT aerosol inhaler Inhale 2 puffs 2 (Two) Times a Day. 5/15/24   Bernadette Sparks APRN   guaiFENesin (MUCINEX) 600 MG 12 hr tablet Take 2 tablets by mouth Every 12 (Twelve) Hours. 23   Julieth Anderson APRN   hydroCHLOROthiazide (MICROZIDE) 12.5 MG capsule Take 1 capsule by mouth Every Morning. 21   Provider, MD Gwendolyn   losartan (COZAAR) 100 MG tablet Take 1 tablet by mouth Daily. 21   Provider, MD Gwendolyn   montelukast (SINGULAIR) 10 MG tablet Take 1 tablet by mouth Every Night. 23   Julieth Anderson APRN   vardenafil (LEVITRA) 20 MG tablet Take 1 tablet by mouth Daily As Needed for Erectile Dysfunction. 24   Roverto Dickens PA       Social History     Socioeconomic History    Marital status:    Tobacco Use    Smoking status: Former     Current packs/day: 0.00     Average packs/day: 1 pack/day for 38.0 years (38.0 ttl pk-yrs)     Types: Cigarettes     Start date: 1985     Quit date: 2023     Years since quittin.6     Passive exposure: Past    Smokeless tobacco: Never   Vaping Use    Vaping status: Never Used   Substance and Sexual Activity    Alcohol use: No    Drug use: No    Sexual activity: Defer       Objective   Vital Signs:   /76   Pulse 72   Ht 177.8 cm (70\")   Wt 106 kg (233 lb)   SpO2 94% Comment: RA  BMI 33.43 kg/m²     Physical Exam  Constitutional:       Appearance: He is obese.   HENT:      Head:      Comments: Hearing aids  Cardiovascular:      Rate and Rhythm: Normal rate and regular rhythm.      Heart sounds: No murmur heard.  Pulmonary:      Effort: Pulmonary effort is normal.      Breath sounds: Normal breath sounds.   Musculoskeletal:      Right lower leg: No edema.      Left lower leg: No edema.      Comments: Right lower extremity amputee   Neurological:      Mental Status: He is alert and oriented to person, place, and time.      "   Result Review :    PFT Values          2/26/2024    09:15   Pre Drug PFT Results   FVC 61   FEV1 35   FEF 25-75% 12   FEV1/FVC 44   Other Tests PFT Results   DLCO 67   D/VAsb 91     My interpretation of the PFT : none    Results for orders placed in visit on 02/26/24    Spirometry with Diffusion Capacity    Narrative  Spirometry with Diffusion Capacity    Performed by: Brandy Ruth, RRT  Authorized by: Bernadette Sparks APRN  Pre Drug % Predicted  FVC: 61%  FEV1: 35%  FEF 25-75%: 12%  FEV1/FVC: 44%  DLCO: 67%  D/VAsb: 91%    Interpretation  Spirometry  Spirometry shows severe obstruction. There is reduced midflow suggesting small airway/airflow obstruction.  Diffusion Capacity  The patient's diffusion capacity is moderately reduced.  Diffusion capacity is normal when corrected for alveolar volume.      Results for orders placed in visit on 08/22/22    Pulmonary Function Test    Narrative  Pulmonary Function Test  Performed by: Brandy Ruth, RRT  Authorized by: Bernadette Sparks APRN    Pre Drug % Predicted  FVC: 64%  FEV1: 35%  FEF 25-75%: 11%  FEV1/FVC: 43%  DLCO: 84%  D/VAsb: 105%    Interpretation  Spirometry  Spirometry shows severe obstruction. There is reduced midflow suggesting small airway/airflow obstruction.  Review of FVL curve  There is a flattening of the expiratory curve, suggesting variable intrathoracic obstruction.  Diffusion Capacity  The patient's diffusion capacity is normal.  Diffusion capacity is normal when corrected for alveolar volume.      Results for orders placed during the hospital encounter of 09/11/19    Full Pulmonary Function Test With Bronchodilator    Narrative  [facility] - Pulmonary Function Test    [address] [place] [address]  [place]  [address]  [phone]    Patient : Roc Rees  MRN : [id number]  CSN : [id number]  Pulmonologist : Marv Graves MD  Date :  9/12/2019    ______________________________________________________________________    Interpretation :  1.  Spirometry is consistent with a severe obstructive ventilatory defect.  2.  Lung volumes reveal hyperinflation.  There is also a mild decrease in vital capacity second to obstruction and a mild decrease in inspiratory capacity.  3.  Diffusion capacity is within normal limits particularly when corrected for alveolar volume.      Marv Graves MD      My interpretation of imaging:  none    My interpretation of labs: none      Assessment and Plan {CC Problem List  Visit Diagnosis  ROS  Review (Popup)  Health Maintenance  Quality  BestPractice  Medications  SmartSets  SnapShot Encounters  Media : 23}    Diagnoses and all orders for this visit:    1. Stage 3 severe COPD by GOLD classification (Primary)  Comments:  Continue Breztri.  Use breathing treatments when needed.  Trial of Ohtuvayre, new inhaled PDE 3/4 inhibitor.  Will send forms to specialty pharmacy    2. Class 2 severe obesity due to excess calories with serious comorbidity and body mass index (BMI) of 37.0 to 37.9 in adult  Comments:  Can contribute to shortness of breath.  Education material provided.  Exercise limited    3. Scarring of lung  Comments:  Contributes to shortness of breath.  CT due with follow-up.  Order placed    4. Obstructive sleep apnea  Comments:  Not interested in a sleep study.  Continue 2 L of oxygen with sleep.  He is compliant with this and benefiting from    5. Personal history of nicotine dependence  Comments:  Continue to avoid smoking.  Low-dose CT due with follow-up.  Order placed  Orders:  -      CT Chest Low Dose Cancer Screening WO; Future    6. Occupational exposure to coal dust  Comments:  Contributes to shortness of breath and lung scarring.  CT and PFTs due with follow-up    7. Environmental allergies  Comments:  Contributes to cough and congestion.  Continue azelastine and Singulair        Body  mass index is 33.43 kg/m².    Bernadette Sparks, APRN  8/26/2024  10:29 CDT    Follow Up   Return in about 6 months (around 2/26/2025) for ct, FVL with diffusion.    Patient was given instructions and counseling regarding his condition or for health maintenance advice. Please see specific information pulled into the AVS if appropriate.

## 2024-08-06 DIAGNOSIS — J01.90 ACUTE NON-RECURRENT SINUSITIS, UNSPECIFIED LOCATION: Primary | ICD-10-CM

## 2024-08-06 RX ORDER — AZELASTINE 1 MG/ML
2 SPRAY, METERED NASAL 2 TIMES DAILY
Qty: 30 ML | Refills: 5 | Status: SHIPPED | OUTPATIENT
Start: 2024-08-06

## 2024-08-06 NOTE — TELEPHONE ENCOUNTER
Received a fax from [facility] requesting refills on astelin nasal spray.    Rx Refill Note  Requested Prescriptions     Pending Prescriptions Disp Refills    azelastine (ASTELIN) 0.1 % nasal spray 30 mL 5     Si sprays into the nostril(s) as directed by provider 2 (Two) Times a Day. Use in each nostril as directed      Last office visit with prescribing clinician: 2024   Last telemedicine visit with prescribing clinician: Visit date not found   Next office visit with prescribing clinician: 2024                         Would you like a call back once the refill request has been completed: [] Yes [] No    If the office needs to give you a call back, can they leave a voicemail: [] Yes [] No    Rabia Ledezma MA  24, 10:51 CDT

## 2024-08-16 DIAGNOSIS — J44.9 STAGE 3 SEVERE COPD BY GOLD CLASSIFICATION: ICD-10-CM

## 2024-08-16 RX ORDER — ALBUTEROL SULFATE 90 UG/1
2 AEROSOL, METERED RESPIRATORY (INHALATION) EVERY 4 HOURS PRN
Qty: 18 G | Refills: 2 | Status: SHIPPED | OUTPATIENT
Start: 2024-08-16

## 2024-08-16 NOTE — TELEPHONE ENCOUNTER
Fax received from the pharmacy for a refill on Albuterol hfa. Refill request passed protocol and sent to the pharmacy.  Rx Refill Note  Requested Prescriptions     Pending Prescriptions Disp Refills    albuterol sulfate  (90 Base) MCG/ACT inhaler 18 g 2     Sig: Inhale 2 puffs Every 4 (Four) Hours As Needed for Wheezing or Shortness of Air.      Last office visit with prescribing clinician: 2/26/2024   Last telemedicine visit with prescribing clinician: Visit date not found   Next office visit with prescribing clinician: 8/26/2024                         Would you like a call back once the refill request has been completed: [] Yes [] No    If the office needs to give you a call back, can they leave a voicemail: [] Yes [] No    Ajay Stephens CMA  08/16/24, 14:28 CDT

## 2024-08-26 ENCOUNTER — OFFICE VISIT (OUTPATIENT)
Dept: PULMONOLOGY | Facility: CLINIC | Age: 69
End: 2024-08-26
Payer: COMMERCIAL

## 2024-08-26 VITALS
HEIGHT: 70 IN | HEART RATE: 72 BPM | DIASTOLIC BLOOD PRESSURE: 76 MMHG | WEIGHT: 233 LBS | BODY MASS INDEX: 33.36 KG/M2 | SYSTOLIC BLOOD PRESSURE: 130 MMHG | OXYGEN SATURATION: 94 %

## 2024-08-26 DIAGNOSIS — G47.33 OBSTRUCTIVE SLEEP APNEA: Chronic | ICD-10-CM

## 2024-08-26 DIAGNOSIS — J44.9 STAGE 3 SEVERE COPD BY GOLD CLASSIFICATION: Primary | Chronic | ICD-10-CM

## 2024-08-26 DIAGNOSIS — J98.4 SCARRING OF LUNG: Chronic | ICD-10-CM

## 2024-08-26 DIAGNOSIS — E66.01 CLASS 2 SEVERE OBESITY DUE TO EXCESS CALORIES WITH SERIOUS COMORBIDITY AND BODY MASS INDEX (BMI) OF 37.0 TO 37.9 IN ADULT: Chronic | ICD-10-CM

## 2024-08-26 DIAGNOSIS — Z57.2 OCCUPATIONAL EXPOSURE TO COAL DUST: Chronic | ICD-10-CM

## 2024-08-26 DIAGNOSIS — Z87.891 PERSONAL HISTORY OF NICOTINE DEPENDENCE: Chronic | ICD-10-CM

## 2024-08-26 DIAGNOSIS — Z91.09 ENVIRONMENTAL ALLERGIES: Chronic | ICD-10-CM

## 2024-08-26 PROCEDURE — 99214 OFFICE O/P EST MOD 30 MIN: CPT | Performed by: NURSE PRACTITIONER

## 2024-08-26 SDOH — HEALTH STABILITY - PHYSICAL HEALTH: OCCUPATIONAL EXPOSURE TO DUST: Z57.2

## 2024-11-01 DIAGNOSIS — J44.9 COPD, GROUP C, BY GOLD 2017 CLASSIFICATION: ICD-10-CM

## 2024-11-01 RX ORDER — ALBUTEROL SULFATE 0.83 MG/ML
2.5 SOLUTION RESPIRATORY (INHALATION) 4 TIMES DAILY PRN
Qty: 360 ML | Refills: 5 | Status: SHIPPED | OUTPATIENT
Start: 2024-11-01

## 2024-11-01 NOTE — TELEPHONE ENCOUNTER
Received a fax from [facility] requesting refills on albuterol nebs.    Rx Refill Note  Requested Prescriptions     Pending Prescriptions Disp Refills    albuterol (PROVENTIL) (2.5 MG/3ML) 0.083% nebulizer solution 360 mL 5     Sig: Take 2.5 mg by nebulization 4 (Four) Times a Day As Needed for Wheezing. COPD J44.9      Last office visit with prescribing clinician: 8/26/2024   Last telemedicine visit with prescribing clinician: Visit date not found   Next office visit with prescribing clinician: 2/27/2025                         Would you like a call back once the refill request has been completed: [] Yes [] No    If the office needs to give you a call back, can they leave a voicemail: [] Yes [] No    Rabia Ledezma MA  11/01/24, 15:21 CDT

## 2024-11-07 NOTE — PROGRESS NOTES
Subjective    Mr. Rees is 69 y.o. male    Chief Complaint: Low testosterone    History of Present Illness  Patient I last saw 08/07/2023 for history of low testosterone as well as erectile dysfunction.  He has not been on any testosterone replacement for several months.  He cannot remember why but he somehow missed his follow-up appointment.  He has not got any recent labs.  He has been using Axiron testosterone solution.  In the past he has not noticed any appreciable improvement in symptoms although he has noticed his libido and desire have decreased which I told him could be related to testosterone.    He is on Levitra for his erectile dysfunction which he states works okay but he would like to know if there is anything different or better he could try.    The following portions of the patient's history were reviewed and updated as appropriate: allergies, current medications, past family history, past medical history, past social history, past surgical history and problem list.    Review of Systems      Current Outpatient Medications:     albuterol (PROVENTIL) (2.5 MG/3ML) 0.083% nebulizer solution, Take 2.5 mg by nebulization 4 (Four) Times a Day As Needed for Wheezing. COPD J44.9, Disp: 360 mL, Rfl: 5    albuterol sulfate  (90 Base) MCG/ACT inhaler, Inhale 2 puffs Every 4 (Four) Hours As Needed for Wheezing or Shortness of Air., Disp: 18 g, Rfl: 2    amLODIPine (NORVASC) 2.5 MG tablet, Take 1 tablet by mouth Daily., Disp: , Rfl:     aspirin 81 MG EC tablet, Take 1 tablet by mouth Daily., Disp: , Rfl:     azelastine (ASTELIN) 0.1 % nasal spray, 2 sprays into the nostril(s) as directed by provider 2 (Two) Times a Day. Use in each nostril as directed, Disp: 30 mL, Rfl: 5    Budeson-Glycopyrrol-Formoterol (Breztri Aerosphere) 160-9-4.8 MCG/ACT aerosol inhaler, Inhale 2 puffs 2 (Two) Times a Day., Disp: 10.7 g, Rfl: 11    guaiFENesin (MUCINEX) 600 MG 12 hr tablet, Take 2 tablets by mouth Every 12 (Twelve)  Hours., Disp: 20 tablet, Rfl: 0    hydroCHLOROthiazide (MICROZIDE) 12.5 MG capsule, Take 1 capsule by mouth Every Morning., Disp: , Rfl:     losartan (COZAAR) 100 MG tablet, Take 1 tablet by mouth Daily., Disp: , Rfl:     montelukast (SINGULAIR) 10 MG tablet, Take 1 tablet by mouth Every Night., Disp: 30 tablet, Rfl: 0    vardenafil (LEVITRA) 20 MG tablet, Take 1 tablet by mouth Daily As Needed for Erectile Dysfunction., Disp: 30 tablet, Rfl: 5    vardenafil (Levitra) 20 MG tablet, Take 1 tablet by mouth As Needed for Erectile Dysfunction for up to 180 days., Disp: 30 tablet, Rfl: 5    Past Medical History:   Diagnosis Date    Class 2 severe obesity due to excess calories with serious comorbidity and body mass index (BMI) of 37.0 to 37.9 in adult 8/10/2021    COPD, group C, by GOLD 2017 classification 8/10/2021    History of transfusion 1986    Hypertension     Lung nodule 8/10/2021    Right upper lobe, 2007, 4 mm    Nephrolithiasis     Obstructive sleep apnea 2/21/2022    Occupational exposure to coal dust 8/10/2021    Personal history of nicotine dependence 8/10/2021    Scarring of lung 8/10/2021    Shoulder pain     SOB (shortness of breath)        Past Surgical History:   Procedure Laterality Date    BELOW KNEE LEG AMPUTATION Right     LYMPH NODE BIOPSY      TOTAL SHOULDER ARTHROPLASTY W/ DISTAL CLAVICLE EXCISION Right 1/22/2019    Procedure: RIGHT  REVERSE TOTAL SHOULDER  ARTHROPLASTY;  Surgeon: Donnie Frias MD;  Location:  [facility];  Service: Orthopedics    URETEROSCOPY LASER LITHOTRIPSY WITH STENT INSERTION Left 4/25/2019    Procedure: CYSTOSCOPY LEFT URETERAL BALLOON DILATION AND URETEROSCOPY LASER LITHOTRIPSY WITH LEFT STENT INSERTION;  Surgeon: Tyrel Hilario MD;  Location:  [facility];  Service: Urology    VEIN SURGERY Left 10/12/2020    Procedure: LEFT SAPHENOUS VEIN CLOSURE using Venaseal;  Surgeon: Edgardo Vargas DO;  Location:  [facility];  Service: Vascular;  Laterality:  "Left;       Social History     Socioeconomic History    Marital status:    Tobacco Use    Smoking status: Former     Current packs/day: 0.00     Average packs/day: 1 pack/day for 38.0 years (38.0 ttl pk-yrs)     Types: Cigarettes     Start date: 1985     Quit date: 2023     Years since quittin.8     Passive exposure: Past    Smokeless tobacco: Never   Vaping Use    Vaping status: Never Used   Substance and Sexual Activity    Alcohol use: No    Drug use: No    Sexual activity: Defer       Family History   Problem Relation Age of Onset    Kidney cancer Father     No Known Problems Mother        Objective    Temp 96.9 °F (36.1 °C)   Ht 177.8 cm (70\")   Wt 109 kg (240 lb)   BMI 34.44 kg/m²     Physical Exam  Vitals reviewed.   Constitutional:       Appearance: Normal appearance.   HENT:      Head: Normocephalic and atraumatic.   Pulmonary:      Effort: Pulmonary effort is normal.   Skin:     Coloration: Skin is not pale.   Neurological:      Mental Status: He is alert.   Psychiatric:         Mood and Affect: Mood normal.         Behavior: Behavior normal.             Results for orders placed or performed in visit on 24   POC Urinalysis Dipstick, Multipro    Collection Time: 24 10:19 AM    Specimen: Urine   Result Value Ref Range    Color Yellow Yellow, Straw, Dark Yellow, Ilnda    Clarity, UA Clear Clear    Glucose, UA Negative Negative mg/dL    Bilirubin Negative Negative    Ketones, UA Negative Negative    Specific Gravity  1.020 1.005 - 1.030    Blood, UA Trace (A) Negative    pH, Urine 6.0 5.0 - 8.0    Protein, POC Negative Negative mg/dL    Urobilinogen, UA Normal Normal, 0.2 E.U./dL    Nitrite, UA Negative Negative    Leukocytes Negative Negative     Assessment and Plan    Diagnoses and all orders for this visit:    1. Low testosterone (Primary)  -     POC Urinalysis Dipstick, Multipro  -     vardenafil (Levitra) 20 MG tablet; Take 1 tablet by mouth As Needed for Erectile " Dysfunction for up to 180 days.  Dispense: 30 tablet; Refill: 5  -     Testosterone; Future  -     Hemoglobin & Hematocrit, Blood; Future  -     PSA DIAGNOSTIC; Future    2. Erectile dysfunction, unspecified erectile dysfunction type  -     vardenafil (Levitra) 20 MG tablet; Take 1 tablet by mouth As Needed for Erectile Dysfunction for up to 180 days.  Dispense: 30 tablet; Refill: 5        Regarding his low testosterone will get updated labs with testosterone hemoglobin hematocrit PSA.  He has not been using his Axiron for some time he had missed a follow-up appointment.  Will go ahead and send in refill request for the Axiron.    Also sent prescription to [facility] pharmacy for the combination Cialis Viagra gummy we had discussed other treatment options or medication that could work better than Levitra.    Plan on 3-month follow-up labs today.

## 2024-11-08 ENCOUNTER — OFFICE VISIT (OUTPATIENT)
Dept: UROLOGY | Facility: CLINIC | Age: 69
End: 2024-11-08
Payer: COMMERCIAL

## 2024-11-08 VITALS — TEMPERATURE: 96.9 F | BODY MASS INDEX: 34.36 KG/M2 | WEIGHT: 240 LBS | HEIGHT: 70 IN

## 2024-11-08 DIAGNOSIS — R79.89 LOW TESTOSTERONE: Primary | ICD-10-CM

## 2024-11-08 DIAGNOSIS — N52.9 ERECTILE DYSFUNCTION, UNSPECIFIED ERECTILE DYSFUNCTION TYPE: ICD-10-CM

## 2024-11-08 LAB
BILIRUB BLD-MCNC: NEGATIVE MG/DL
CLARITY, POC: CLEAR
COLOR UR: YELLOW
GLUCOSE UR STRIP-MCNC: NEGATIVE MG/DL
KETONES UR QL: NEGATIVE
LEUKOCYTE EST, POC: NEGATIVE
NITRITE UR-MCNC: NEGATIVE MG/ML
PH UR: 6 [PH] (ref 5–8)
PROT UR STRIP-MCNC: NEGATIVE MG/DL
RBC # UR STRIP: ABNORMAL /UL
SP GR UR: 1.02 (ref 1–1.03)
UROBILINOGEN UR QL: NORMAL

## 2024-11-08 RX ORDER — VARDENAFIL HYDROCHLORIDE 20 MG/1
20 TABLET ORAL AS NEEDED
Qty: 30 TABLET | Refills: 5 | Status: SHIPPED | OUTPATIENT
Start: 2024-11-08 | End: 2025-05-07

## 2024-12-02 ENCOUNTER — TELEPHONE (OUTPATIENT)
Dept: PULMONOLOGY | Facility: CLINIC | Age: 69
End: 2024-12-02
Payer: COMMERCIAL

## 2024-12-02 NOTE — TELEPHONE ENCOUNTER
"Per patient he states he saw his PCP about one week ago and was prescribed \"erythromycin and steroids\" which he did complete. His PCP did a viral swab on him and it was \"negative\".  He now has a \"dry cough\" and was wondering if there is anything over the counter that might help?  He is on Breztri, Full strength Mucinex and Azelastine. He has breathing treatments and has not been taking them.  He is scheduled to have \"ear surgery next week\".    Please advise.  "
He can try Robitussin DM or Delsym  
Message relayed and patient voiced understanding.   
no

## 2024-12-03 DIAGNOSIS — J44.9 STAGE 3 SEVERE COPD BY GOLD CLASSIFICATION: ICD-10-CM

## 2024-12-03 RX ORDER — ALBUTEROL SULFATE 90 UG/1
2 INHALANT RESPIRATORY (INHALATION) EVERY 4 HOURS PRN
Qty: 18 G | Refills: 2 | Status: SHIPPED | OUTPATIENT
Start: 2024-12-03

## 2024-12-03 NOTE — TELEPHONE ENCOUNTER
Refill request received by fax from [facility].    Rx Refill Note  Requested Prescriptions     Pending Prescriptions Disp Refills    albuterol sulfate  (90 Base) MCG/ACT inhaler 18 g 2     Sig: Inhale 2 puffs Every 4 (Four) Hours As Needed for Wheezing or Shortness of Air.      Last office visit with prescribing clinician: 8/26/2024   Last telemedicine visit with prescribing clinician: Visit date not found   Next office visit with prescribing clinician: 2/27/2025                         Would you like a call back once the refill request has been completed: [] Yes [] No    If the office needs to give you a call back, can they leave a voicemail: [] Yes [] No    Leda Ramos MA  12/03/24, 10:38 CST

## 2025-01-27 NOTE — PROGRESS NOTES
Subjective    Mr. Rees is 69 y.o. male    Chief Complaint: Low Testosterone    History of Present Illness  Patient presents for history of low testosterone he is also on testosterone replacement therapy.  He has been using Axiron gel which is applied to the armpit.  He uses 1 application per armpit daily.  His pharmacy did not stock Axiron they were not able to get it so he has not been taking the testosterone for the past 2 months.  He can tell there is a decrease in energy and decrease in libido since his testosterone has decreased.  His recent labs show a testosterone of 193 hemoglobin hematocrit hemoglobin is 12.1 which is slightly below normal and hematocrit is 38.8.  PSA is 0.48.  Will need hepatic function panel today.  Patient also signed a controlled substance agreement which is in his chart Tomas was done.    The following portions of the patient's history were reviewed and updated as appropriate: allergies, current medications, past family history, past medical history, past social history, past surgical history and problem list.    Review of Systems   Constitutional:  Positive for fatigue.   Genitourinary:         Decreased libido         Current Outpatient Medications:     albuterol (PROVENTIL) (2.5 MG/3ML) 0.083% nebulizer solution, Take 2.5 mg by nebulization 4 (Four) Times a Day As Needed for Wheezing. COPD J44.9, Disp: 360 mL, Rfl: 5    albuterol sulfate  (90 Base) MCG/ACT inhaler, Inhale 2 puffs Every 4 (Four) Hours As Needed for Wheezing or Shortness of Air., Disp: 18 g, Rfl: 2    amLODIPine (NORVASC) 2.5 MG tablet, Take 1 tablet by mouth Daily., Disp: , Rfl:     aspirin 81 MG EC tablet, Take 1 tablet by mouth Daily., Disp: , Rfl:     atorvastatin (LIPITOR) 10 MG tablet, Take 1 tablet by mouth Daily., Disp: , Rfl:     azelastine (ASTELIN) 0.1 % nasal spray, 2 sprays into the nostril(s) as directed by provider 2 (Two) Times a Day. Use in each nostril as directed, Disp: 30 mL, Rfl: 5     Budeson-Glycopyrrol-Formoterol (Breztri Aerosphere) 160-9-4.8 MCG/ACT aerosol inhaler, Inhale 2 puffs 2 (Two) Times a Day., Disp: 10.7 g, Rfl: 11    cetirizine (zyrTEC) 10 MG tablet, Take 1 tablet by mouth Daily., Disp: , Rfl:     clindamycin 1 % gel, APPLY A THIN LAYER TWICE A DAY TO THE AFFECTED AREAS OF THE RIGHT LEG AS NEEDED FOR FLARES., Disp: , Rfl:     guaiFENesin (MUCINEX) 600 MG 12 hr tablet, Take 2 tablets by mouth Every 12 (Twelve) Hours., Disp: 20 tablet, Rfl: 0    hydroCHLOROthiazide (MICROZIDE) 12.5 MG capsule, Take 1 capsule by mouth Every Morning., Disp: , Rfl:     losartan (COZAAR) 100 MG tablet, Take 1 tablet by mouth Daily., Disp: , Rfl:     losartan-hydrochlorothiazide (HYZAAR) 100-25 MG per tablet, Take 1 tablet by mouth Daily., Disp: , Rfl:     metoprolol succinate XL (TOPROL-XL) 25 MG 24 hr tablet, Take 1 tablet by mouth Daily., Disp: , Rfl:     montelukast (SINGULAIR) 10 MG tablet, Take 1 tablet by mouth Every Night., Disp: 30 tablet, Rfl: 0    vardenafil (LEVITRA) 20 MG tablet, Take 1 tablet by mouth Daily As Needed for Erectile Dysfunction., Disp: 30 tablet, Rfl: 5    vardenafil (Levitra) 20 MG tablet, Take 1 tablet by mouth As Needed for Erectile Dysfunction for up to 180 days., Disp: 30 tablet, Rfl: 5    vardenafil (Levitra) 20 MG tablet, Take 1 tablet by mouth As Needed for Erectile Dysfunction for up to 180 days., Disp: 30 tablet, Rfl: 5    Past Medical History:   Diagnosis Date    Class 2 severe obesity due to excess calories with serious comorbidity and body mass index (BMI) of 37.0 to 37.9 in adult 8/10/2021    COPD, group C, by GOLD 2017 classification 8/10/2021    History of transfusion 1986    Hypertension     Lung nodule 8/10/2021    Right upper lobe, 2007, 4 mm    Nephrolithiasis     Obstructive sleep apnea 2/21/2022    Occupational exposure to coal dust 8/10/2021    Personal history of nicotine dependence 8/10/2021    Scarring of lung 8/10/2021    Shoulder pain     SOB  "(shortness of breath)        Past Surgical History:   Procedure Laterality Date    BELOW KNEE LEG AMPUTATION Right     COCHLEAR IMPLANT REVISION      LYMPH NODE BIOPSY      TOTAL SHOULDER ARTHROPLASTY W/ DISTAL CLAVICLE EXCISION Right 2019    Procedure: RIGHT  REVERSE TOTAL SHOULDER  ARTHROPLASTY;  Surgeon: Donnie Frias MD;  Location:  [facility];  Service: Orthopedics    URETEROSCOPY LASER LITHOTRIPSY WITH STENT INSERTION Left 2019    Procedure: CYSTOSCOPY LEFT URETERAL BALLOON DILATION AND URETEROSCOPY LASER LITHOTRIPSY WITH LEFT STENT INSERTION;  Surgeon: Tyrel Hilario MD;  Location:  [facility];  Service: Urology    VEIN SURGERY Left 10/12/2020    Procedure: LEFT SAPHENOUS VEIN CLOSURE using Venaseal;  Surgeon: Edgardo Vargas DO;  Location:  [facility];  Service: Vascular;  Laterality: Left;       Social History     Socioeconomic History    Marital status:    Tobacco Use    Smoking status: Former     Current packs/day: 0.00     Average packs/day: 1 pack/day for 38.0 years (38.0 ttl pk-yrs)     Types: Cigarettes     Start date: 1985     Quit date: 2023     Years since quittin.1     Passive exposure: Past    Smokeless tobacco: Never   Vaping Use    Vaping status: Never Used   Substance and Sexual Activity    Alcohol use: No    Drug use: No    Sexual activity: Defer       Family History   Problem Relation Age of Onset    Kidney cancer Father     No Known Problems Mother        Objective    Temp 97.8 °F (36.6 °C) (Infrared)   Ht 177.8 cm (70\")   Wt 110 kg (243 lb)   BMI 34.87 kg/m²     Physical Exam  Vitals reviewed.   Constitutional:       Appearance: Normal appearance.   HENT:      Head: Normocephalic and atraumatic.   Pulmonary:      Effort: Pulmonary effort is normal.   Neurological:      Mental Status: He is alert.   Psychiatric:         Mood and Affect: Mood normal.         Behavior: Behavior normal.             Results for orders placed or performed " in visit on 02/10/25   Urine Drug Screen - Urine, Clean Catch    Collection Time: 02/10/25 10:14 AM    Specimen: Urine, Clean Catch   Result Value Ref Range    THC, Screen, Urine Negative Negative    Phencyclidine (PCP), Urine Negative Negative    Cocaine Screen, Urine Negative Negative    Methamphetamine, Ur Negative Negative    Opiate Screen Negative Negative    Amphetamine Screen, Urine Negative Negative    Benzodiazepine Screen, Urine Negative Negative    Tricyclic Antidepressants Screen Negative Negative    Methadone Screen, Urine Negative Negative    Barbiturates Screen, Urine Negative Negative    Oxycodone Screen, Urine Negative Negative    Buprenorphine, Screen, Urine Negative Negative   Fentanyl, Urine - Urine, Clean Catch    Collection Time: 02/10/25 10:14 AM    Specimen: Urine, Clean Catch   Result Value Ref Range    Fentanyl, Urine Negative Negative   IPSS Questionnaire (AUA-7):  Incomplete emptying  Over the past month, how often have you had a sensation of not emptying your bladder completely after you finished urinating?: About half the time (02/10/25 0922)  Frequency  Over the past month, how often have you had to urinate again less than two hours after you finishied urinating ?: About half the time (02/10/25 0922)  Intermittency  Over the past month, how often have you found you stopped and started again several time when you urinated ?: Less than half the time (02/10/25 0922)  Urgency  Over the last month, how often have you found it difficult  have you found it difficult to postpone urination ?: Less than half the time (02/10/25 0922)  Weak Stream  Over the past month, how often have you had a weak urinary stream ?: Almost always (02/10/25 0922)  Straining  Over the past month, how often have you had to push or strain to begin urination ?: Almost always (02/10/25 0922)  Nocturia  Over the past month, how many times did you most typically get up to urinate from the time you went to bed until the  time you got up in the morning ?: 1 time (02/10/25 0922)  Quality of life due to urinary symptoms  If you were to spend the rest of your life with your urinary condition the way it is now, how would feel about that?: Mixed - about equally satisfied (02/10/25 0922)    Scores  Total IPSS Score: (!) 21 (02/10/25 0922)  Total Score = Symptomatic Level: Severely symptomatic: 20-35 (02/10/25 0922)       Assessment and Plan    Diagnoses and all orders for this visit:    1. Low testosterone (Primary)  -     POC Urinalysis Dipstick, Multipro  -     Urine Drug Screen - Urine, Clean Catch  -     Hepatic Function Panel; Future  -     vardenafil (Levitra) 20 MG tablet; Take 1 tablet by mouth As Needed for Erectile Dysfunction for up to 180 days.  Dispense: 30 tablet; Refill: 5  -     Fentanyl, Urine - Urine, Clean Catch      Patient signed the controlled substance agreement.  He needs refills however [facility] is not his normal pharmacy we also called [facility] another pharmacy would like to try they also did not stock Axiron.  He would be willing to try AndroGel.  Testosterone was 198 which is not surprising as he has not been on the testosterone replacement hemoglobin hematocrit hematocrit was within normal range hemoglobin slightly below normal.  PSA was 0.4.    Hepatic function panel done today is normal.    3 months for checkup with no labs prior

## 2025-02-03 ENCOUNTER — LAB (OUTPATIENT)
Dept: LAB | Facility: HOSPITAL | Age: 70
End: 2025-02-03
Payer: COMMERCIAL

## 2025-02-03 ENCOUNTER — LAB (OUTPATIENT)
Dept: LAB | Facility: HOSPITAL | Age: 70
End: 2025-02-03

## 2025-02-03 ENCOUNTER — TELEPHONE (OUTPATIENT)
Dept: UROLOGY | Facility: CLINIC | Age: 70
End: 2025-02-03
Payer: COMMERCIAL

## 2025-02-03 DIAGNOSIS — R97.20 ELEVATED PROSTATE SPECIFIC ANTIGEN (PSA): ICD-10-CM

## 2025-02-03 DIAGNOSIS — R97.20 ELEVATED PROSTATE SPECIFIC ANTIGEN (PSA): Primary | ICD-10-CM

## 2025-02-03 DIAGNOSIS — R79.89 LOW TESTOSTERONE: ICD-10-CM

## 2025-02-03 LAB
HCT VFR BLD AUTO: 38.8 % (ref 37.5–51)
HGB BLD-MCNC: 12.1 G/DL (ref 13–17.7)
PSA SERPL-MCNC: 0.49 NG/ML (ref 0–4)
TESTOST SERPL-MCNC: 193 NG/DL (ref 193–740)

## 2025-02-03 PROCEDURE — 85018 HEMOGLOBIN: CPT

## 2025-02-03 PROCEDURE — 85014 HEMATOCRIT: CPT

## 2025-02-03 PROCEDURE — 84403 ASSAY OF TOTAL TESTOSTERONE: CPT

## 2025-02-03 PROCEDURE — 84153 ASSAY OF PSA TOTAL: CPT

## 2025-02-03 PROCEDURE — 36415 COLL VENOUS BLD VENIPUNCTURE: CPT

## 2025-02-03 NOTE — PROGRESS NOTES
Orthopaedic Clinic Note - Established Patient    NAME:  Farooq Jean   : 1955  MRN: [id number]    2025    CHIEF COMPLAINT:  follow up left knee pain, repeat injection    HISTORY OF PRESENT ILLNESS:   The patient is a 69 y.o. male who returns today for follow up of left knee pain, requesting repeat injection.  Last injection was in 2024.  Patient denies any recent trauma, fall, or other other injury. Reports recent flare up of increased pain over last couple of weeks.    Past Medical History:        Diagnosis Date    History of blood transfusion     Hypertension        Past Surgical History:        Procedure Laterality Date    COLONOSCOPY      COLONOSCOPY N/A 2019    Dr JULIO Calvert-Diverticular disease-Tubular AP (-) dysplasia x 2    JOINT REPLACEMENT Right     shoulder    LEG AMPUTATION BELOW KNEE Right 1986    SKIN GRAFT Right     leg       Current Medications:   Prior to Admission medications    Medication Sig Start Date End Date Taking? Authorizing Provider   amLODIPine (NORVASC) 2.5 MG tablet TAKE 1 TABLET BY MOUTH EVERY DAY FOR 30 DAYS for 90   Yes ProviderShana MD   atorvastatin (LIPITOR) 10 MG tablet Take 1 tablet by mouth daily   Yes ProviderShana MD   azelastine (ASTEPRO) 137 MCG/SPRAY nasal spray 1 puff in each nostril Nasally Twice a day for 30 day(s) 24  Yes ProviderShana MD   cetirizine (ZYRTEC) 10 MG tablet Take 1 tablet by mouth daily   Yes ProviderShana MD   clindamycin 1 % gel APPLY A THIN LAYER TWICE A DAY TO THE AFFECTED AREAS OF THE RIGHT LEG AS NEEDED FOR FLARES. 25  Yes ProviderShana MD   OHTUVAYRE 3 MG/2.5ML SUSP  24  Yes ProviderShana MD   losartan-hydroCHLOROthiazide (HYZAAR) 100-25 MG per tablet Take 1 tablet by mouth daily 24  Yes Shana Ortiz MD   metoprolol succinate (TOPROL XL) 25 MG extended release tablet Take 1 tablet by mouth daily   Yes Shana Ortiz MD   vardenafil (LEVITRA) 20 MG

## 2025-02-05 ENCOUNTER — OFFICE VISIT (OUTPATIENT)
Age: 70
End: 2025-02-05
Payer: MEDICARE

## 2025-02-05 VITALS — BODY MASS INDEX: 34.36 KG/M2 | HEIGHT: 70 IN | WEIGHT: 240 LBS

## 2025-02-05 DIAGNOSIS — M17.12 PRIMARY OSTEOARTHRITIS OF LEFT KNEE: Primary | ICD-10-CM

## 2025-02-05 PROCEDURE — 20610 DRAIN/INJ JOINT/BURSA W/O US: CPT

## 2025-02-05 RX ORDER — METOPROLOL SUCCINATE 25 MG/1
25 TABLET, EXTENDED RELEASE ORAL DAILY
COMMUNITY

## 2025-02-05 RX ORDER — AZELASTINE HYDROCHLORIDE 137 UG/1
SPRAY, METERED NASAL
COMMUNITY
Start: 2024-08-06

## 2025-02-05 RX ORDER — ATORVASTATIN CALCIUM 10 MG/1
10 TABLET, FILM COATED ORAL DAILY
COMMUNITY

## 2025-02-05 RX ORDER — CETIRIZINE HYDROCHLORIDE 10 MG/1
10 TABLET ORAL DAILY
COMMUNITY

## 2025-02-05 RX ORDER — ENSIFENTRINE 3 MG/2.5ML
SUSPENSION RESPIRATORY (INHALATION)
COMMUNITY
Start: 2024-12-29

## 2025-02-05 RX ORDER — VARDENAFIL HYDROCHLORIDE 20 MG/1
20 TABLET ORAL PRN
COMMUNITY

## 2025-02-05 RX ORDER — AMLODIPINE BESYLATE 2.5 MG/1
TABLET ORAL
COMMUNITY

## 2025-02-05 RX ORDER — TRIAMCINOLONE ACETONIDE 40 MG/ML
40 INJECTION, SUSPENSION INTRA-ARTICULAR; INTRAMUSCULAR ONCE
Status: COMPLETED | OUTPATIENT
Start: 2025-02-05 | End: 2025-02-05

## 2025-02-05 RX ORDER — CLINDAMYCIN PHOSPHATE 10 MG/G
GEL TOPICAL
COMMUNITY
Start: 2025-01-29

## 2025-02-05 RX ORDER — BUPIVACAINE HYDROCHLORIDE 2.5 MG/ML
2 INJECTION, SOLUTION INFILTRATION; PERINEURAL ONCE
Status: COMPLETED | OUTPATIENT
Start: 2025-02-05 | End: 2025-02-05

## 2025-02-05 RX ORDER — LOSARTAN POTASSIUM AND HYDROCHLOROTHIAZIDE 25; 100 MG/1; MG/1
1 TABLET ORAL DAILY
COMMUNITY
Start: 2024-12-12

## 2025-02-05 RX ORDER — LIDOCAINE HYDROCHLORIDE 10 MG/ML
2 INJECTION, SOLUTION INFILTRATION; PERINEURAL ONCE
Status: COMPLETED | OUTPATIENT
Start: 2025-02-05 | End: 2025-02-05

## 2025-02-05 RX ADMIN — TRIAMCINOLONE ACETONIDE 40 MG: 40 INJECTION, SUSPENSION INTRA-ARTICULAR; INTRAMUSCULAR at 08:47

## 2025-02-05 RX ADMIN — BUPIVACAINE HYDROCHLORIDE 5 MG: 2.5 INJECTION, SOLUTION INFILTRATION; PERINEURAL at 08:46

## 2025-02-05 RX ADMIN — LIDOCAINE HYDROCHLORIDE 2 ML: 10 INJECTION, SOLUTION INFILTRATION; PERINEURAL at 08:47

## 2025-02-10 ENCOUNTER — LAB (OUTPATIENT)
Dept: LAB | Facility: HOSPITAL | Age: 70
End: 2025-02-10
Payer: MEDICARE

## 2025-02-10 ENCOUNTER — OFFICE VISIT (OUTPATIENT)
Dept: UROLOGY | Facility: CLINIC | Age: 70
End: 2025-02-10
Payer: COMMERCIAL

## 2025-02-10 ENCOUNTER — TELEPHONE (OUTPATIENT)
Dept: UROLOGY | Facility: CLINIC | Age: 70
End: 2025-02-10
Payer: COMMERCIAL

## 2025-02-10 VITALS — TEMPERATURE: 97.8 F | HEIGHT: 70 IN | WEIGHT: 243 LBS | BODY MASS INDEX: 34.79 KG/M2

## 2025-02-10 DIAGNOSIS — R79.89 LOW TESTOSTERONE: ICD-10-CM

## 2025-02-10 DIAGNOSIS — R79.89 LOW TESTOSTERONE: Primary | ICD-10-CM

## 2025-02-10 LAB
ALBUMIN SERPL-MCNC: 4.2 G/DL (ref 3.5–5.2)
ALP SERPL-CCNC: 61 U/L (ref 39–117)
ALT SERPL W P-5'-P-CCNC: 16 U/L (ref 1–41)
AMPHET+METHAMPHET UR QL: NEGATIVE
AMPHETAMINES UR QL: NEGATIVE
AST SERPL-CCNC: 19 U/L (ref 1–40)
BARBITURATES UR QL SCN: NEGATIVE
BENZODIAZ UR QL SCN: NEGATIVE
BILIRUB CONJ SERPL-MCNC: 0.1 MG/DL (ref 0–0.3)
BILIRUB INDIRECT SERPL-MCNC: 0.2 MG/DL
BILIRUB SERPL-MCNC: 0.3 MG/DL (ref 0–1.2)
BUPRENORPHINE SERPL-MCNC: NEGATIVE NG/ML
CANNABINOIDS SERPL QL: NEGATIVE
COCAINE UR QL: NEGATIVE
FENTANYL UR-MCNC: NEGATIVE NG/ML
METHADONE UR QL SCN: NEGATIVE
OPIATES UR QL: NEGATIVE
OXYCODONE UR QL SCN: NEGATIVE
PCP UR QL SCN: NEGATIVE
PROT SERPL-MCNC: 7.1 G/DL (ref 6–8.5)
TRICYCLICS UR QL SCN: NEGATIVE

## 2025-02-10 PROCEDURE — 99214 OFFICE O/P EST MOD 30 MIN: CPT | Performed by: PHYSICIAN ASSISTANT

## 2025-02-10 PROCEDURE — 36415 COLL VENOUS BLD VENIPUNCTURE: CPT

## 2025-02-10 PROCEDURE — 80307 DRUG TEST PRSMV CHEM ANLYZR: CPT | Performed by: PHYSICIAN ASSISTANT

## 2025-02-10 PROCEDURE — 80076 HEPATIC FUNCTION PANEL: CPT

## 2025-02-10 RX ORDER — CLINDAMYCIN PHOSPHATE 10 MG/G
GEL TOPICAL
COMMUNITY
Start: 2025-01-29

## 2025-02-10 RX ORDER — METOPROLOL SUCCINATE 25 MG/1
1 TABLET, EXTENDED RELEASE ORAL DAILY
COMMUNITY

## 2025-02-10 RX ORDER — TESTOSTERONE 20.25 MG/1.25G
1 GEL TOPICAL 2 TIMES DAILY
Qty: 88 G | Refills: 5 | Status: SHIPPED | OUTPATIENT
Start: 2025-02-10

## 2025-02-10 RX ORDER — VARDENAFIL HYDROCHLORIDE 20 MG/1
20 TABLET ORAL AS NEEDED
Qty: 30 TABLET | Refills: 5 | Status: SHIPPED | OUTPATIENT
Start: 2025-02-10 | End: 2025-08-09

## 2025-02-10 RX ORDER — CETIRIZINE HYDROCHLORIDE 10 MG/1
1 TABLET ORAL DAILY
COMMUNITY

## 2025-02-10 RX ORDER — ATORVASTATIN CALCIUM 10 MG/1
1 TABLET, FILM COATED ORAL DAILY
COMMUNITY

## 2025-02-10 RX ORDER — LOSARTAN POTASSIUM AND HYDROCHLOROTHIAZIDE 25; 100 MG/1; MG/1
1 TABLET ORAL DAILY
COMMUNITY
Start: 2024-12-12

## 2025-02-10 RX ORDER — TESTOSTERONE 20.25 MG/1.25G
1 GEL TOPICAL 2 TIMES DAILY
Qty: 88 G | Refills: 5 | Status: CANCELLED | OUTPATIENT
Start: 2025-02-10

## 2025-02-10 NOTE — TELEPHONE ENCOUNTER
----- Message from Roverto Dickens sent at 2/10/2025 12:30 PM CST -----  Regarding: HFT  Function panel was normal  ----- Message -----  From: Lab, Background User  Sent: 2/10/2025  10:48 AM CST  To: ALPHONSO Cazares

## 2025-02-13 NOTE — PROGRESS NOTES
Chief Complaint  COPD    Subjective    History of Present Illness {  Problem List  Visit Diagnosis   Encounters  Notes  Medications  Labs  Result Review Imaging  Media: 23}    Roc Rees presents to [facility] for:    History of Present Illness  Management of COPD and lung scarring. His alpha-1 testing is normal. Most recent FEV1 was 35.  He is here today for repeat PFTs.      He is obese. He is a former smoker.  He quit in 2023.  1 pack/day for 38 years.  CT February 2024 showing lung scarring but no suspicious nodules.  He is here today for follow-up imaging.      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 He has not worked in the industry since.      He is on Breztri after failing Breo.  He has a nebulizer he can use when needed.  He uses it a couple of times per day.  Last office visit I prescribed the new PDE3/4 inhibitor, Ohtuvayre.  He is not certain it is helping.  He has been using his incentive device intermittently for mucus clearing.  It is not helping either.        He was started on 2 L of nocturnal oxygen after hospitalization.  He is compliant with this and benefiting from it.  He did not want a sleep study.       Allergies are doing better on the azelastine and Singulair.         Current Outpatient Medications:     albuterol (PROVENTIL) (2.5 MG/3ML) 0.083% nebulizer solution, Take 2.5 mg by nebulization 4 (Four) Times a Day As Needed for Wheezing. COPD J44.9, Disp: 360 mL, Rfl: 5    albuterol sulfate  (90 Base) MCG/ACT inhaler, Inhale 2 puffs Every 4 (Four) Hours As Needed for Wheezing or Shortness of Air., Disp: 18 g, Rfl: 11    amLODIPine (NORVASC) 2.5 MG tablet, Take 1 tablet by mouth Daily., Disp: , Rfl:     aspirin 81 MG EC tablet, Take 1 tablet by mouth Daily., Disp: , Rfl:     atorvastatin  (LIPITOR) 10 MG tablet, Take 1 tablet by mouth Daily., Disp: , Rfl:     azelastine (ASTELIN) 0.1 % nasal spray, 2 sprays into the nostril(s) as directed by provider 2 (Two) Times a Day. Use in each nostril as directed, Disp: 30 mL, Rfl: 5    Budeson-Glycopyrrol-Formoterol (Breztri Aerosphere) 160-9-4.8 MCG/ACT aerosol inhaler, Inhale 2 puffs 2 (Two) Times a Day., Disp: 10.7 g, Rfl: 11    cetirizine (zyrTEC) 10 MG tablet, Take 1 tablet by mouth Daily., Disp: , Rfl:     clindamycin 1 % gel, APPLY A THIN LAYER TWICE A DAY TO THE AFFECTED AREAS OF THE RIGHT LEG AS NEEDED FOR FLARES., Disp: , Rfl:     guaiFENesin (MUCINEX) 600 MG 12 hr tablet, Take 2 tablets by mouth Every 12 (Twelve) Hours., Disp: 20 tablet, Rfl: 0    hydroCHLOROthiazide (MICROZIDE) 12.5 MG capsule, Take 1 capsule by mouth Every Morning., Disp: , Rfl:     losartan (COZAAR) 100 MG tablet, Take 1 tablet by mouth Daily., Disp: , Rfl:     metoprolol succinate XL (TOPROL-XL) 25 MG 24 hr tablet, Take 1 tablet by mouth Daily., Disp: , Rfl:     montelukast (SINGULAIR) 10 MG tablet, Take 1 tablet by mouth Every Night., Disp: 30 tablet, Rfl: 0    Testosterone 20.25 MG/1.25GM (1.62%) gel, Place 1 Application on the skin as directed by provider 2 (Two) Times a Day., Disp: 88 g, Rfl: 5    vardenafil (LEVITRA) 20 MG tablet, Take 1 tablet by mouth Daily As Needed for Erectile Dysfunction., Disp: 30 tablet, Rfl: 5    vardenafil (Levitra) 20 MG tablet, Take 1 tablet by mouth As Needed for Erectile Dysfunction for up to 180 days., Disp: 30 tablet, Rfl: 5    vardenafil (Levitra) 20 MG tablet, Take 1 tablet by mouth As Needed for Erectile Dysfunction for up to 180 days., Disp: 30 tablet, Rfl: 5    Fluticasone-Umeclidin-Vilant (Trelegy Ellipta) 200-62.5-25 MCG/ACT inhaler, Inhale 1 puff Daily for 14 days., Disp: 1 each, Rfl: 0    predniSONE (DELTASONE) 10 MG tablet, Take 4 tabs daily x 3 days, then take 3 tabs daily x 3 days, then take 2 tabs daily x 3 days, then take 1 tab  "daily x 3 days, Disp: 31 tablet, Rfl: 0  No current facility-administered medications for this visit.    Social History     Socioeconomic History    Marital status:    Tobacco Use    Smoking status: Former     Current packs/day: 0.00     Average packs/day: 1 pack/day for 38.0 years (38.0 ttl pk-yrs)     Types: Cigarettes     Start date: 1985     Quit date: 2023     Years since quittin.1     Passive exposure: Past    Smokeless tobacco: Never   Vaping Use    Vaping status: Never Used   Substance and Sexual Activity    Alcohol use: No    Drug use: No    Sexual activity: Defer       Objective   Vital Signs:   /72   Pulse 70   Ht 175.3 cm (69\")   Wt 109 kg (240 lb)   SpO2 94% Comment: RA  BMI 35.44 kg/m²     Physical Exam  HENT:      Head:      Comments: Left hearing aid, right cochlear implant  Cardiovascular:      Rate and Rhythm: Normal rate and regular rhythm.      Heart sounds: No murmur heard.  Pulmonary:      Effort: Pulmonary effort is normal.      Breath sounds: Normal breath sounds.   Musculoskeletal:      Right lower leg: No edema.      Left lower leg: No edema.      Comments: Right above-the-knee amputee   Neurological:      Mental Status: He is alert and oriented to person, place, and time.        Result Review :    PFT Values          2024    09:15 2025    09:45   Pre Drug PFT Results   FVC 61 56   FEV1 35 22   FEF 25-75% 12 8   FEV1/FVC 44 31   Other Tests PFT Results   DLCO 67 52   D/VAsb 91 82     Results for orders placed in visit on 25    Spirometry with Diffusion Capacity    Narrative  Spirometry with Diffusion Capacity    Performed by: Brandy Ruth, RRT  Authorized by: Bernadette Sparks, APRN  Pre Drug % Predicted  FVC: 56%  FEV1: 22%  FEF 25-75%: 8%  FEV1/FVC: 31%  DLCO: 52%  D/VAsb: 82%    Interpretation  Spirometry  Spirometry shows very severe obstruction. There is reduced midflow suggesting small airway/airflow obstruction.      Results for " orders placed in visit on 02/26/24    Spirometry with Diffusion Capacity    Narrative  Spirometry with Diffusion Capacity    Performed by: Brandy Ruth, RRT  Authorized by: Bernadette Sparks APRN  Pre Drug % Predicted  FVC: 61%  FEV1: 35%  FEF 25-75%: 12%  FEV1/FVC: 44%  DLCO: 67%  D/VAsb: 91%    Interpretation  Spirometry  Spirometry shows severe obstruction. There is reduced midflow suggesting small airway/airflow obstruction.  Diffusion Capacity  The patient's diffusion capacity is moderately reduced.  Diffusion capacity is normal when corrected for alveolar volume.      Results for orders placed in visit on 08/22/22    Pulmonary Function Test    Narrative  Pulmonary Function Test  Performed by: Brandy Ruth, JOE  Authorized by: Bernadette Sparks APRN    Pre Drug % Predicted  FVC: 64%  FEV1: 35%  FEF 25-75%: 11%  FEV1/FVC: 43%  DLCO: 84%  D/VAsb: 105%    Interpretation  Spirometry  Spirometry shows severe obstruction. There is reduced midflow suggesting small airway/airflow obstruction.  Review of FVL curve  There is a flattening of the expiratory curve, suggesting variable intrathoracic obstruction.  Diffusion Capacity  The patient's diffusion capacity is normal.  Diffusion capacity is normal when corrected for alveolar volume.    CT Chest Low Dose Cancer Screening WO (02/26/2025 11:38)     My interpretation of imaging: Minimal secretions in the airway, left lower lobe, otherwise nothing suspicious    My interpretation of labs: none      Assessment and Plan {CC Problem List  Visit Diagnosis  ROS  Review (Popup)  Health Maintenance  Quality  BestPractice  Medications  SmartSets  SnapShot Encounters  Media : 23}    Diagnoses and all orders for this visit:    1. Scarring of lung (Primary)  Comments:  Contributes to shortness of breath.  Not progressive phenotype.  Will add flutter for mucus clearing  Orders:  -     Spirometry with Diffusion Capacity  -     OSCILLATING POSITIVE  EXIRATORY PRESSURE (OPEP); Future    2. Stage 3 severe COPD by GOLD classification  Comments:  Trelegy 200 in place of Breztri.  Continue Otuhvare.  Add flutter in addition to nebs.  PFTs worse  Orders:  -     OSCILLATING POSITIVE EXIRATORY PRESSURE (OPEP); Future  -     Fluticasone-Umeclidin-Vilant (Trelegy Ellipta) 200-62.5-25 MCG/ACT inhaler; Inhale 1 puff Daily for 14 days.  Dispense: 1 each; Refill: 0    3. Obstructive sleep apnea  Comments:  Intolerant to PAP therapy.  Continue 2 L of oxygen    4. Personal history of nicotine dependence  Comments:  Continue to avoid smoking.  Recent CT stable.  Repeat annual per Fleischner guidelines    5. Occupational exposure to coal dust  Comments:  Recommend annual imaging and PFTs    6. Class 2 severe obesity due to excess calories with serious comorbidity and body mass index (BMI) of 35.0 to 35.9 in adult  Comments:  Can contribute to shortness of breath    7. Environmental allergies  Comments:  Contributes to congestion.  Continue Singulair and azelastine    8. Atelectasis  Comments:  Will add flutter device in addition to nebulized bronchodilators 4 times daily  Orders:  -     OSCILLATING POSITIVE EXIRATORY PRESSURE (OPEP); Future    9. COPD with acute exacerbation  Comments:  Depo-Medrol IM.  Prednisone taper.  Add flutter device  Orders:  -     methylPREDNISolone acetate (DEPO-medrol) injection 80 mg  -     predniSONE (DELTASONE) 10 MG tablet; Take 4 tabs daily x 3 days, then take 3 tabs daily x 3 days, then take 2 tabs daily x 3 days, then take 1 tab daily x 3 days  Dispense: 31 tablet; Refill: 0      Roc Rees  reports that he quit smoking about 2 years ago. His smoking use included cigarettes. He started smoking about 40 years ago. He has a 38 pack-year smoking history. He has been exposed to tobacco smoke. He has never used smokeless tobacco.          Body mass index is 35.44 kg/m². Educational material provided after visit summary about elevated  BMI      Bernadette Sparks, APRN  2/27/2025  10:50 CST    Follow Up   Return in about 6 months (around 8/27/2025).    Patient was given instructions and counseling regarding his condition or for health maintenance advice. Please see specific information pulled into the AVS if appropriate.

## 2025-02-25 ENCOUNTER — TELEPHONE (OUTPATIENT)
Dept: VASCULAR SURGERY | Facility: CLINIC | Age: 70
End: 2025-02-25
Payer: COMMERCIAL

## 2025-02-25 NOTE — TELEPHONE ENCOUNTER
SPOKE WITH PT AS A REMINDER OF 0830 ARRIVAL FOR 0900 [facility] THEN TO SEE NIDHI AT 1015

## 2025-02-26 ENCOUNTER — OFFICE VISIT (OUTPATIENT)
Dept: VASCULAR SURGERY | Facility: CLINIC | Age: 70
End: 2025-02-26
Payer: COMMERCIAL

## 2025-02-26 ENCOUNTER — HOSPITAL ENCOUNTER (OUTPATIENT)
Dept: CT IMAGING | Facility: HOSPITAL | Age: 70
Discharge: HOME | End: 2025-02-26
Payer: MEDICARE

## 2025-02-26 ENCOUNTER — HOSPITAL ENCOUNTER (OUTPATIENT)
Dept: ULTRASOUND IMAGING | Facility: HOSPITAL | Age: 70
Discharge: HOME | End: 2025-02-26
Payer: MEDICARE

## 2025-02-26 VITALS
SYSTOLIC BLOOD PRESSURE: 124 MMHG | WEIGHT: 236 LBS | HEART RATE: 68 BPM | DIASTOLIC BLOOD PRESSURE: 72 MMHG | HEIGHT: 70 IN | BODY MASS INDEX: 33.79 KG/M2 | OXYGEN SATURATION: 98 %

## 2025-02-26 DIAGNOSIS — I73.9 PAD (PERIPHERAL ARTERY DISEASE): Primary | ICD-10-CM

## 2025-02-26 DIAGNOSIS — E66.01 CLASS 2 SEVERE OBESITY DUE TO EXCESS CALORIES WITH SERIOUS COMORBIDITY AND BODY MASS INDEX (BMI) OF 35.0 TO 35.9 IN ADULT: ICD-10-CM

## 2025-02-26 DIAGNOSIS — I73.9 PAD (PERIPHERAL ARTERY DISEASE): ICD-10-CM

## 2025-02-26 DIAGNOSIS — I87.322 CHRONIC VENOUS HYPERTENSION WITH INFLAMMATION INVOLVING LEFT SIDE: ICD-10-CM

## 2025-02-26 DIAGNOSIS — I65.23 BILATERAL CAROTID ARTERY STENOSIS: ICD-10-CM

## 2025-02-26 DIAGNOSIS — Z87.891 PERSONAL HISTORY OF NICOTINE DEPENDENCE: Chronic | ICD-10-CM

## 2025-02-26 DIAGNOSIS — I10 ESSENTIAL HYPERTENSION: ICD-10-CM

## 2025-02-26 DIAGNOSIS — J44.9 STAGE 3 SEVERE COPD BY GOLD CLASSIFICATION: ICD-10-CM

## 2025-02-26 DIAGNOSIS — E66.812 CLASS 2 SEVERE OBESITY DUE TO EXCESS CALORIES WITH SERIOUS COMORBIDITY AND BODY MASS INDEX (BMI) OF 35.0 TO 35.9 IN ADULT: ICD-10-CM

## 2025-02-26 PROCEDURE — 93880 EXTRACRANIAL BILAT STUDY: CPT

## 2025-02-26 PROCEDURE — 99214 OFFICE O/P EST MOD 30 MIN: CPT | Performed by: NURSE PRACTITIONER

## 2025-02-26 PROCEDURE — 71271 CT THORAX LUNG CANCER SCR C-: CPT

## 2025-02-26 PROCEDURE — 93923 UPR/LXTR ART STDY 3+ LVLS: CPT

## 2025-02-26 RX ORDER — ALBUTEROL SULFATE 90 UG/1
2 INHALANT RESPIRATORY (INHALATION) EVERY 4 HOURS PRN
Qty: 18 G | Refills: 11 | Status: SHIPPED | OUTPATIENT
Start: 2025-02-26

## 2025-02-26 NOTE — LETTER
March 7, 2025     Medhat Petty DO  [address] [place] [address]    Patient: Roc Rees   YOB: 1955   Date of Visit: 2/26/2025     Dear Medhat Petty DO:       Thank you for referring Roc Rees to me for evaluation. Below are the relevant portions of my assessment and plan of care.    If you have questions, please do not hesitate to call me. I look forward to following Roc along with you.         Sincerely,        GALINA Jenkins        CC: No Recipients    Felicia Tinoco APRN  03/07/25 1304  Sign when Signing Visit  02/26/2025        Medhat Petty DO   [address] [place] [address]    Roc Rees  1955    Chief Complaint   Patient presents with   • Follow-up     1 year follow up w/ testing. Last seen 3/4/24. Patient denies any stroke type symptoms or issues with leg.        Dear Medhat Petty DO        HPI  I had the pleasure of seeing your patient Roc Rees in the office today.  As you recall, Roc Rees is a 69 y.o.  male who we are following for venous insufficiency and carotid occlusive disease.  He does have a history of right below-knee amputation following trauma.   He has previously undergone greater saphenous vein stripped from the knee to the ankle in an effort to save his right lower extremity.  He did undergo endovenous ablation of the left lower extremity with venaseal on 10/12/2020.  Currently he is doing well and denies any strokelike symptoms or claudication to his lower extremities.  He is maintained on aspirin and Lipitor.  He did have noninvasive testing performed today, which I did review in office.         Review of Systems   Constitutional: Negative.    HENT: Negative.     Eyes: Negative.    Respiratory: Negative.     Cardiovascular:  Positive for leg swelling.   Gastrointestinal: Negative.    Endocrine: Negative.    Genitourinary: Negative.    Musculoskeletal:  Positive for gait problem.   Skin:  Positive for color change.  "  Allergic/Immunologic: Negative.    Hematological: Negative.    Psychiatric/Behavioral: Negative.     All other systems reviewed and are negative.       /72   Pulse 68   Ht 177.8 cm (70\")   Wt 107 kg (236 lb)   SpO2 98%   BMI 33.86 kg/m²      Physical Exam  Vitals and nursing note reviewed.   Constitutional:       General: He is not in acute distress.     Appearance: Normal appearance. He is well-developed. He is obese. He is not diaphoretic.   HENT:      Head: Normocephalic and atraumatic.   Eyes:      Pupils: Pupils are equal, round, and reactive to light.   Neck:      Vascular: No carotid bruit or JVD.   Cardiovascular:      Rate and Rhythm: Normal rate and regular rhythm.      Pulses:           Femoral pulses are 2+ on the right side and 2+ on the left side.       Popliteal pulses are 2+ on the right side and 2+ on the left side.        Dorsalis pedis pulses are 2+ on the left side.        Posterior tibial pulses are 2+ on the left side.      Heart sounds: Normal heart sounds, S1 normal and S2 normal. No murmur heard.     No friction rub. No gallop.      Comments: Right BKA, varicose veins of the left lower extremity  Pulmonary:      Effort: Pulmonary effort is normal.      Breath sounds: Normal breath sounds.   Abdominal:      General: Bowel sounds are normal. There is no abdominal bruit.      Palpations: Abdomen is soft.      Tenderness: There is no abdominal tenderness.   Musculoskeletal:         General: Deformity (right BKA) present. Normal range of motion.      Right Lower Extremity: Right leg is amputated below knee.   Skin:     General: Skin is warm and dry.      Findings: Rash present.   Neurological:      General: No focal deficit present.      Mental Status: He is alert and oriented to person, place, and time.      Cranial Nerves: No cranial nerve deficit.   Psychiatric:         Mood and Affect: Mood normal.         Behavior: Behavior normal.         Thought Content: Thought content " normal.         Judgment: Judgment normal.         Diagnostic data:  Narrative & Impression      History: PAD     Comments: Left lower extremity arterial with multi-level pulse volume  recordings and segmental pressures were performed at rest and stress.     The patient has a right BKA.     The left ankle/brachial index is 1.17. The waveforms are triphasic  without dampening. These findings are consistent with no significant  arterial insufficiency of the left lower extremity at rest.     IMPRESSION:  Impression:  1. No significant arterial insufficiency of the left lower extremity at  rest.              This report was signed and finalized on 2/26/2025 3:25 PM by Dr. Edgardo Vargas MD.       History: Carotid occlusive disease     IMPRESSION:  Impression:  1. There is less than 50% stenosis of the right internal carotid artery.  2. There is less than 50% stenosis of the left internal carotid artery.  3. Antegrade flow is demonstrated in bilateral vertebral arteries.     Comments: Bilateral carotid vertebral arterial duplex scan was  performed.     Grayscale imaging shows intimal thickening and calcified elements at the  carotid bifurcation. The right internal carotid artery peak systolic  velocity is 80.7 cm/sec. The end-diastolic velocity is 13.3 cm/sec. The  right ICA/CCA ratio is approximately 0.8. These findings correlate with  less than 50% stenosis of the right internal carotid artery.     Grayscale imaging shows intimal thickening and calcified elements at the  carotid bifurcation. The left internal carotid artery peak systolic  velocity is 66.8 cm/sec. The end-diastolic velocity is 20.3 cm/sec. The  left ICA/CCA ratio is approximately 0.7. These findings correlate with  less than 50% stenosis of the left internal carotid artery.     Antegrade flow is demonstrated in bilateral vertebral arteries.        This report was signed and finalized on 2/26/2025 3:29 PM by Dr. Edgardo Vargas MD.     Patient  Active Problem List   Diagnosis   • BPH (benign prostatic hypertrophy) with urinary obstruction   • Impotence due to erectile dysfunction   • Hypogonadism in male   • Erectile dysfunction   • Benign prostatic hyperplasia with lower urinary tract symptoms   • Rotator cuff arthropathy of right shoulder   • Primary osteoarthritis of right shoulder   • Microscopic hematuria   • Renal stone   • Hydronephrosis with renal and ureteral calculus obstruction   • Ureteral stone   • Renal cyst   • Ureteral stone with hydronephrosis   • Essential hypertension   • Chronic venous hypertension with inflammation involving left side   • Stage 3 severe COPD by GOLD classification   • Personal history of nicotine dependence   • Scarring of lung   • Occupational exposure to coal dust   • Class 2 severe obesity due to excess calories with serious comorbidity and body mass index (BMI) of 35.0 to 35.9 in adult   • Colorectal polyps   • Obstructive sleep apnea   • Elevated troponin level not due myocardial infarction   • Environmental allergies         ICD-10-CM ICD-9-CM   1. PAD (peripheral artery disease)  I73.9 443.9   2. Bilateral carotid artery stenosis  I65.23 433.10     433.30   3. Chronic venous hypertension with inflammation involving left side  I87.322 459.32   4. Essential hypertension  I10 401.9   5. Class 2 severe obesity due to excess calories with serious comorbidity and body mass index (BMI) of 35.0 to 35.9 in adult  E66.812 278.01    E66.01 V85.35    Z68.35              Plan: After thoroughly evaluating Roc Rees, I believe the best course of action is to remain conservative from vascular surgery standpoint.  Currently he is doing well and denies any strokelike symptoms or claudication of his left lower extremity.  He is having no problems with his right BKA.  I did review his testing which shows no arterial insufficiency to his left lower extremity and less than 5050% carotid stenosis bilaterally.  We will see him back  in 1 year with repeat noninvasive testing including ISABELA and a carotid duplex.  I did discuss vascular risk factors as they pertain to the progression of vascular disease including controlling his hypertension which is stable on his current medications.  He should continue his aspirin 81 mg daily and Lipitor 10 mg daily in addition to his other medications.  Body mass index is 33.86 kg/m². Class 2 Severe Obesity (BMI >=35 and <=39.9). Obesity-related health conditions include the following: hypertension, dyslipidemias, and peripheral vascular disease. Obesity is unchanged. BMI is is above average; BMI management plan is completed. We discussed portion control and increasing exercise.  This was all discussed in full with complete understanding.    Thank you for allowing me to participate in the care of your patient.  Please do not hesitate with any questions or concerns.  I will keep you aware of any further encounters with Roc Rees.        Sincerely yours,         GALINA Jenkins

## 2025-02-26 NOTE — TELEPHONE ENCOUNTER
Rx Refill Note  Requested Prescriptions     Pending Prescriptions Disp Refills    albuterol sulfate  (90 Base) MCG/ACT inhaler 18 g 2     Sig: Inhale 2 puffs Every 4 (Four) Hours As Needed for Wheezing or Shortness of Air.      Last office visit with prescribing clinician: 8/26/2024   Last telemedicine visit with prescribing clinician: Visit date not found   Next office visit with prescribing clinician: 2/27/2025                         Would you like a call back once the refill request has been completed: [] Yes [] No    If the office needs to give you a call back, can they leave a voicemail: [] Yes [] No    Brandy Holm CMA  02/26/25, 14:56 CST

## 2025-02-26 NOTE — PROGRESS NOTES
"02/26/2025        Medhat Petty,    [address] [place] [address]    Roc Rees  1955    Chief Complaint   Patient presents with    Follow-up     1 year follow up w/ testing. Last seen 3/4/24. Patient denies any stroke type symptoms or issues with leg.        Dear Medhat Petty,         HPI  I had the pleasure of seeing your patient Roc Rees in the office today.  As you recall, Roc Rees is a 69 y.o.  male who we are following for venous insufficiency and carotid occlusive disease.  He does have a history of right below-knee amputation following trauma.   He has previously undergone greater saphenous vein stripped from the knee to the ankle in an effort to save his right lower extremity.  He did undergo endovenous ablation of the left lower extremity with venaseal on 10/12/2020.  Currently he is doing well and denies any strokelike symptoms or claudication to his lower extremities.  He is maintained on aspirin and Lipitor.  He did have noninvasive testing performed today, which I did review in office.         Review of Systems   Constitutional: Negative.    HENT: Negative.     Eyes: Negative.    Respiratory: Negative.     Cardiovascular:  Positive for leg swelling.   Gastrointestinal: Negative.    Endocrine: Negative.    Genitourinary: Negative.    Musculoskeletal:  Positive for gait problem.   Skin:  Positive for color change.   Allergic/Immunologic: Negative.    Hematological: Negative.    Psychiatric/Behavioral: Negative.     All other systems reviewed and are negative.       /72   Pulse 68   Ht 177.8 cm (70\")   Wt 107 kg (236 lb)   SpO2 98%   BMI 33.86 kg/m²      Physical Exam  Vitals and nursing note reviewed.   Constitutional:       General: He is not in acute distress.     Appearance: Normal appearance. He is well-developed. He is obese. He is not diaphoretic.   HENT:      Head: Normocephalic and atraumatic.   Eyes:      Pupils: Pupils are equal, round, and reactive to light. "   Neck:      Vascular: No carotid bruit or JVD.   Cardiovascular:      Rate and Rhythm: Normal rate and regular rhythm.      Pulses:           Femoral pulses are 2+ on the right side and 2+ on the left side.       Popliteal pulses are 2+ on the right side and 2+ on the left side.        Dorsalis pedis pulses are 2+ on the left side.        Posterior tibial pulses are 2+ on the left side.      Heart sounds: Normal heart sounds, S1 normal and S2 normal. No murmur heard.     No friction rub. No gallop.      Comments: Right BKA, varicose veins of the left lower extremity  Pulmonary:      Effort: Pulmonary effort is normal.      Breath sounds: Normal breath sounds.   Abdominal:      General: Bowel sounds are normal. There is no abdominal bruit.      Palpations: Abdomen is soft.      Tenderness: There is no abdominal tenderness.   Musculoskeletal:         General: Deformity (right BKA) present. Normal range of motion.      Right Lower Extremity: Right leg is amputated below knee.   Skin:     General: Skin is warm and dry.      Findings: Rash present.   Neurological:      General: No focal deficit present.      Mental Status: He is alert and oriented to person, place, and time.      Cranial Nerves: No cranial nerve deficit.   Psychiatric:         Mood and Affect: Mood normal.         Behavior: Behavior normal.         Thought Content: Thought content normal.         Judgment: Judgment normal.         Diagnostic data:  Narrative & Impression      History: [facility]     Comments: Left lower extremity arterial with multi-level pulse volume  recordings and segmental pressures were performed at rest and stress.     The patient has a right BKA.     The left ankle/brachial index is 1.17. The waveforms are triphasic  without dampening. These findings are consistent with no significant  arterial insufficiency of the left lower extremity at rest.     IMPRESSION:  Impression:  1. No significant arterial insufficiency of the left lower  extremity at  rest.              This report was signed and finalized on 2/26/2025 3:25 PM by Dr. Edgardo Vargas MD.       History: Carotid occlusive disease     IMPRESSION:  Impression:  1. There is less than 50% stenosis of the right internal carotid artery.  2. There is less than 50% stenosis of the left internal carotid artery.  3. Antegrade flow is demonstrated in bilateral vertebral arteries.     Comments: Bilateral carotid vertebral arterial duplex scan was  performed.     Grayscale imaging shows intimal thickening and calcified elements at the  carotid bifurcation. The right internal carotid artery peak systolic  velocity is 80.7 cm/sec. The end-diastolic velocity is 13.3 cm/sec. The  right ICA/CCA ratio is approximately 0.8. These findings correlate with  less than 50% stenosis of the right internal carotid artery.     Grayscale imaging shows intimal thickening and calcified elements at the  carotid bifurcation. The left internal carotid artery peak systolic  velocity is 66.8 cm/sec. The end-diastolic velocity is 20.3 cm/sec. The  left ICA/CCA ratio is approximately 0.7. These findings correlate with  less than 50% stenosis of the left internal carotid artery.     Antegrade flow is demonstrated in bilateral vertebral arteries.        This report was signed and finalized on 2/26/2025 3:29 PM by Dr. Edgardo Vargas MD.     Patient Active Problem List   Diagnosis    BPH (benign prostatic hypertrophy) with urinary obstruction    Impotence due to erectile dysfunction    Hypogonadism in male    Erectile dysfunction    Benign prostatic hyperplasia with lower urinary tract symptoms    Rotator cuff arthropathy of right shoulder    Primary osteoarthritis of right shoulder    Microscopic hematuria    Renal stone    Hydronephrosis with renal and ureteral calculus obstruction    Ureteral stone    Renal cyst    Ureteral stone with hydronephrosis    Essential hypertension    Chronic venous hypertension with  inflammation involving left side    Stage 3 severe COPD by GOLD classification    Personal history of nicotine dependence    Scarring of lung    Occupational exposure to coal dust    Class 2 severe obesity due to excess calories with serious comorbidity and body mass index (BMI) of 35.0 to 35.9 in adult    Colorectal polyps    Obstructive sleep apnea    Elevated troponin level not due myocardial infarction    Environmental allergies         ICD-10-CM ICD-9-CM   1. PAD (peripheral artery disease)  I73.9 443.9   2. Bilateral carotid artery stenosis  I65.23 433.10     433.30   3. Chronic venous hypertension with inflammation involving left side  I87.322 459.32   4. Essential hypertension  I10 401.9   5. Class 2 severe obesity due to excess calories with serious comorbidity and body mass index (BMI) of 35.0 to 35.9 in adult  E66.812 278.01    E66.01 V85.35    Z68.35              Plan: After thoroughly evaluating Roc Rees, I believe the best course of action is to remain conservative from vascular surgery standpoint.  Currently he is doing well and denies any strokelike symptoms or claudication of his left lower extremity.  He is having no problems with his right BKA.  I did review his testing which shows no arterial insufficiency to his left lower extremity and less than 5050% carotid stenosis bilaterally.  We will see him back in 1 year with repeat noninvasive testing including ISABELA and a carotid duplex.  I did discuss vascular risk factors as they pertain to the progression of vascular disease including controlling his hypertension which is stable on his current medications.  He should continue his aspirin 81 mg daily and Lipitor 10 mg daily in addition to his other medications.  Body mass index is 33.86 kg/m². Class 2 Severe Obesity (BMI >=35 and <=39.9). Obesity-related health conditions include the following: hypertension, dyslipidemias, and peripheral vascular disease. Obesity is unchanged. BMI is is above  average; BMI management plan is completed. We discussed portion control and increasing exercise.  This was all discussed in full with complete understanding.    Thank you for allowing me to participate in the care of your patient.  Please do not hesitate with any questions or concerns.  I will keep you aware of any further encounters with Roc Rees.        Sincerely yours,         GALINA Jenkins

## 2025-02-27 ENCOUNTER — PROCEDURE VISIT (OUTPATIENT)
Dept: PULMONOLOGY | Facility: CLINIC | Age: 70
End: 2025-02-27
Payer: COMMERCIAL

## 2025-02-27 ENCOUNTER — OFFICE VISIT (OUTPATIENT)
Dept: PULMONOLOGY | Facility: CLINIC | Age: 70
End: 2025-02-27
Payer: COMMERCIAL

## 2025-02-27 ENCOUNTER — TELEPHONE (OUTPATIENT)
Dept: PULMONOLOGY | Facility: CLINIC | Age: 70
End: 2025-02-27

## 2025-02-27 VITALS
HEIGHT: 69 IN | BODY MASS INDEX: 35.55 KG/M2 | WEIGHT: 240 LBS | HEART RATE: 70 BPM | OXYGEN SATURATION: 94 % | SYSTOLIC BLOOD PRESSURE: 126 MMHG | DIASTOLIC BLOOD PRESSURE: 72 MMHG

## 2025-02-27 DIAGNOSIS — Z57.2 OCCUPATIONAL EXPOSURE TO COAL DUST: Chronic | ICD-10-CM

## 2025-02-27 DIAGNOSIS — Z91.09 ENVIRONMENTAL ALLERGIES: Chronic | ICD-10-CM

## 2025-02-27 DIAGNOSIS — J44.1 COPD WITH ACUTE EXACERBATION: ICD-10-CM

## 2025-02-27 DIAGNOSIS — E66.812 CLASS 2 SEVERE OBESITY DUE TO EXCESS CALORIES WITH SERIOUS COMORBIDITY AND BODY MASS INDEX (BMI) OF 35.0 TO 35.9 IN ADULT: ICD-10-CM

## 2025-02-27 DIAGNOSIS — J44.9 STAGE 3 SEVERE COPD BY GOLD CLASSIFICATION: Primary | ICD-10-CM

## 2025-02-27 DIAGNOSIS — J98.4 SCARRING OF LUNG: Primary | Chronic | ICD-10-CM

## 2025-02-27 DIAGNOSIS — G47.33 OBSTRUCTIVE SLEEP APNEA: Chronic | ICD-10-CM

## 2025-02-27 DIAGNOSIS — J44.9 STAGE 3 SEVERE COPD BY GOLD CLASSIFICATION: Chronic | ICD-10-CM

## 2025-02-27 DIAGNOSIS — E66.01 CLASS 2 SEVERE OBESITY DUE TO EXCESS CALORIES WITH SERIOUS COMORBIDITY AND BODY MASS INDEX (BMI) OF 35.0 TO 35.9 IN ADULT: ICD-10-CM

## 2025-02-27 DIAGNOSIS — Z87.891 PERSONAL HISTORY OF NICOTINE DEPENDENCE: Chronic | ICD-10-CM

## 2025-02-27 DIAGNOSIS — J98.11 ATELECTASIS: ICD-10-CM

## 2025-02-27 RX ORDER — METHYLPREDNISOLONE ACETATE 40 MG/ML
80 INJECTION, SUSPENSION INTRA-ARTICULAR; INTRALESIONAL; INTRAMUSCULAR; SOFT TISSUE ONCE
Status: COMPLETED | OUTPATIENT
Start: 2025-02-27 | End: 2025-02-27

## 2025-02-27 RX ORDER — FLUTICASONE FUROATE, UMECLIDINIUM BROMIDE AND VILANTEROL TRIFENATATE 200; 62.5; 25 UG/1; UG/1; UG/1
1 POWDER RESPIRATORY (INHALATION) DAILY
Qty: 1 EACH | Refills: 0 | COMMUNITY
Start: 2025-02-27 | End: 2025-03-13

## 2025-02-27 RX ORDER — PREDNISONE 10 MG/1
TABLET ORAL
Qty: 31 TABLET | Refills: 0 | Status: SHIPPED | OUTPATIENT
Start: 2025-02-27

## 2025-02-27 RX ADMIN — METHYLPREDNISOLONE ACETATE 80 MG: 40 INJECTION, SUSPENSION INTRA-ARTICULAR; INTRALESIONAL; INTRAMUSCULAR; SOFT TISSUE at 10:19

## 2025-02-27 SDOH — HEALTH STABILITY - PHYSICAL HEALTH: OCCUPATIONAL EXPOSURE TO DUST: Z57.2

## 2025-02-27 NOTE — TELEPHONE ENCOUNTER
----- Message from Bernadette Sparks sent at 2/26/2025 12:04 PM CST -----  Some mucus noted in the small airways on the left otherwise nothing new or concerning.  Keep follow-up as scheduled

## 2025-02-27 NOTE — PROCEDURES
Spirometry with Diffusion Capacity    Performed by: Brandy Ruth, RRT  Authorized by: Bernadette Sparks APRN     Pre Drug % Predicted    FVC: 56%   FEV1: 22%   FEF 25-75%: 8%   FEV1/FVC: 31%   DLCO: 52%   D/VAsb: 82%    Interpretation   Spirometry   Spirometry shows very severe obstruction. There is reduced midflow suggesting small airway/airflow obstruction.

## 2025-04-18 DIAGNOSIS — J44.9 COPD, GROUP C, BY GOLD 2017 CLASSIFICATION: ICD-10-CM

## 2025-04-18 RX ORDER — BUDESONIDE, GLYCOPYRROLATE, AND FORMOTEROL FUMARATE 160; 9; 4.8 UG/1; UG/1; UG/1
2 AEROSOL, METERED RESPIRATORY (INHALATION) 2 TIMES DAILY
Qty: 10.7 G | Refills: 0 | Status: SHIPPED | OUTPATIENT
Start: 2025-04-18

## 2025-04-18 NOTE — PROGRESS NOTES
Patient of Dr. Hilario states he is here today to get his stent removed SP Ureteroscopy Laser Litho with stent placement on 4/25/19. Patient denies any fever, chills, N&V or hematuria. The tape was removed and using the string stent was pulled with no complications. The patient was advised to continue any medications prescribed in the hospital and to call if he has any questions or concerns. The patient verbalized understanding. Follow up with Debra  in 6 weeks with Renal US prior. Dr. Sosa was in the office for this procedure.    None

## 2025-04-18 NOTE — TELEPHONE ENCOUNTER
Patient has finished the Trelegy sample that was gave to him and he wants to go back on the breztri.    Please refill.   Bernadette is out of office.     Rx Refill Note  Requested Prescriptions     Pending Prescriptions Disp Refills    Budeson-Glycopyrrol-Formoterol (Breztri Aerosphere) 160-9-4.8 MCG/ACT aerosol inhaler 10.7 g 11     Sig: Inhale 2 puffs 2 (Two) Times a Day.      Last office visit with prescribing clinician: 02/27/2025  Last telemedicine visit with prescribing clinician: Visit date not found   Next office visit with prescribing clinician: 08/27/2025                        Would you like a call back once the refill request has been completed: [] Yes [] No    If the office needs to give you a call back, can they leave a voicemail: [] Yes [] No    Brandy Holm CMA  04/18/25, 09:36 CDT

## 2025-05-01 NOTE — PROGRESS NOTES
Subjective    Mr. Rees is 69 y.o. male    Chief Complaint: Low testosterone     History of Present Illness  Patient is a 69-year-old gentleman who presents for follow-up with a history of low testosterone.  Patient completed appropriate lab work urine drug screen and Tomas was completed that revealed no inappropriate prescribing.  Patient is currently on testosterone 1.62% gel 1 pump or 1 application to each side.  His current testosterone is 290 compared to 193 last visit.  Hemoglobin hematocrit are not elevated hemoglobin is borderline or slightly anemic at 12.1 hematocrit normal 39.4 PSA is 2.08.  Liver enzymes liver function panel were all within normal range.    Regarding his erectile dysfunction he is Levitra and has had good response.    The following portions of the patient's history were reviewed and updated as appropriate: allergies, current medications, past family history, past medical history, past social history, past surgical history and problem list.    Review of Systems   Constitutional: Negative.    Genitourinary: Negative.          Current Outpatient Medications:     albuterol (PROVENTIL) (2.5 MG/3ML) 0.083% nebulizer solution, Take 2.5 mg by nebulization 4 (Four) Times a Day As Needed for Wheezing. COPD J44.9, Disp: 360 mL, Rfl: 5    albuterol sulfate  (90 Base) MCG/ACT inhaler, Inhale 2 puffs Every 4 (Four) Hours As Needed for Wheezing or Shortness of Air., Disp: 18 g, Rfl: 11    amLODIPine (NORVASC) 2.5 MG tablet, Take 1 tablet by mouth Daily., Disp: , Rfl:     aspirin 81 MG EC tablet, Take 1 tablet by mouth Daily., Disp: , Rfl:     atorvastatin (LIPITOR) 10 MG tablet, Take 1 tablet by mouth Daily., Disp: , Rfl:     azelastine (ASTELIN) 0.1 % nasal spray, 2 sprays into the nostril(s) as directed by provider 2 (Two) Times a Day. Use in each nostril as directed, Disp: 30 mL, Rfl: 5    Budeson-Glycopyrrol-Formoterol (Breztri Aerosphere) 160-9-4.8 MCG/ACT aerosol inhaler, Inhale 2 puffs 2  (Two) Times a Day., Disp: 10.7 g, Rfl: 0    cetirizine (zyrTEC) 10 MG tablet, Take 1 tablet by mouth Daily., Disp: , Rfl:     clindamycin 1 % gel, APPLY A THIN LAYER TWICE A DAY TO THE AFFECTED AREAS OF THE RIGHT LEG AS NEEDED FOR FLARES., Disp: , Rfl:     guaiFENesin (MUCINEX) 600 MG 12 hr tablet, Take 2 tablets by mouth Every 12 (Twelve) Hours., Disp: 20 tablet, Rfl: 0    hydroCHLOROthiazide (MICROZIDE) 12.5 MG capsule, Take 1 capsule by mouth Every Morning., Disp: , Rfl:     losartan (COZAAR) 100 MG tablet, Take 1 tablet by mouth Daily., Disp: , Rfl:     metoprolol succinate XL (TOPROL-XL) 25 MG 24 hr tablet, Take 1 tablet by mouth Daily., Disp: , Rfl:     montelukast (SINGULAIR) 10 MG tablet, Take 1 tablet by mouth Every Night., Disp: 30 tablet, Rfl: 0    predniSONE (DELTASONE) 10 MG tablet, Take 4 tabs daily x 3 days, then take 3 tabs daily x 3 days, then take 2 tabs daily x 3 days, then take 1 tab daily x 3 days, Disp: 31 tablet, Rfl: 0    Testosterone 20.25 MG/1.25GM (1.62%) gel, Place 1 Application on the skin as directed by provider 2 (Two) Times a Day., Disp: 88 g, Rfl: 5    vardenafil (LEVITRA) 20 MG tablet, Take 1 tablet by mouth Daily As Needed for Erectile Dysfunction., Disp: 30 tablet, Rfl: 5    vardenafil (Levitra) 20 MG tablet, Take 1 tablet by mouth As Needed for Erectile Dysfunction for up to 180 days., Disp: 30 tablet, Rfl: 5    vardenafil (Levitra) 20 MG tablet, Take 1 tablet by mouth As Needed for Erectile Dysfunction for up to 180 days., Disp: 30 tablet, Rfl: 5    Past Medical History:   Diagnosis Date    Class 2 severe obesity due to excess calories with serious comorbidity and body mass index (BMI) of 37.0 to 37.9 in adult 8/10/2021    COPD, group C, by GOLD 2017 classification 8/10/2021    History of transfusion 1986    Hypertension     Lung nodule 8/10/2021    Right upper lobe, 2007, 4 mm    Nephrolithiasis     Obstructive sleep apnea 2/21/2022    Occupational exposure to coal dust  "8/10/2021    Personal history of nicotine dependence 8/10/2021    Scarring of lung 8/10/2021    Shoulder pain     SOB (shortness of breath)        Past Surgical History:   Procedure Laterality Date    BELOW KNEE LEG AMPUTATION Right     COCHLEAR IMPLANT REVISION  2024    [place]    LYMPH NODE BIOPSY      TOTAL SHOULDER ARTHROPLASTY W/ DISTAL CLAVICLE EXCISION Right 2019    Procedure: RIGHT  REVERSE TOTAL SHOULDER  ARTHROPLASTY;  Surgeon: Donnie Frias MD;  Location:  [facility] OR;  Service: Orthopedics    URETEROSCOPY LASER LITHOTRIPSY WITH STENT INSERTION Left 2019    Procedure: CYSTOSCOPY LEFT URETERAL BALLOON DILATION AND URETEROSCOPY LASER LITHOTRIPSY WITH LEFT STENT INSERTION;  Surgeon: Tyrel Hilario MD;  Location:  [facility] OR;  Service: Urology    VEIN SURGERY Left 10/12/2020    Procedure: LEFT SAPHENOUS VEIN CLOSURE using Venaseal;  Surgeon: Edgardo Vargas DO;  Location:  [facility] HYBRID OR 12;  Service: Vascular;  Laterality: Left;       Social History     Socioeconomic History    Marital status:    Tobacco Use    Smoking status: Former     Current packs/day: 0.00     Average packs/day: 1 pack/day for 38.0 years (38.0 ttl pk-yrs)     Types: Cigarettes     Start date: 1985     Quit date: 2023     Years since quittin.3     Passive exposure: Past    Smokeless tobacco: Never   Vaping Use    Vaping status: Never Used   Substance and Sexual Activity    Alcohol use: No    Drug use: No    Sexual activity: Defer       Family History   Problem Relation Age of Onset    Kidney cancer Father     No Known Problems Mother        Objective    Temp 96.7 °F (35.9 °C)   Ht 177.8 cm (70\")   Wt 109 kg (240 lb 6.4 oz)   BMI 34.49 kg/m²     Physical Exam  Constitutional:       Appearance: Normal appearance.   HENT:      Head: Normocephalic and atraumatic.   Pulmonary:      Effort: Pulmonary effort is normal.   Skin:     Coloration: Skin is not pale.   Neurological:      Mental " Status: He is alert.   Psychiatric:         Mood and Affect: Mood normal.         Behavior: Behavior normal.         Assessment and Plan    Diagnoses and all orders for this visit:    1. Low testosterone (Primary)  -     Cancel: POC Urinalysis Dipstick, Multipro  -     Hepatic Function Panel; Future  -     Hemoglobin & Hematocrit, Blood; Future  -     Testosterone; Future  -     PSA Diagnostic; Future  -     Testosterone; Future  -     PSA DIAGNOSTIC; Future    2. Erectile dysfunction, unspecified erectile dysfunction type      His testosterone is modestly improved at 290 I think after discussion based on his mediocre symptom improvement would increase symptoms from the 2 applications daily to 3 applications daily so would be to on 1 side and 1 on the other.  Will go ahead and send in request for refill to be sent to his pharmacy.    Follow-up 3 months with labs.

## 2025-05-05 ENCOUNTER — LAB (OUTPATIENT)
Dept: LAB | Facility: HOSPITAL | Age: 70
End: 2025-05-05
Payer: COMMERCIAL

## 2025-05-05 DIAGNOSIS — R79.89 LOW TESTOSTERONE: ICD-10-CM

## 2025-05-05 LAB
ALBUMIN SERPL-MCNC: 4.4 G/DL (ref 3.5–5)
ALP SERPL-CCNC: 54 U/L (ref 24–120)
ALT SERPL W P-5'-P-CCNC: 21 U/L (ref 0–50)
AST SERPL-CCNC: 23 U/L (ref 7–45)
BILIRUB CONJ SERPL-MCNC: 0 MG/DL (ref 0–0.3)
BILIRUB INDIRECT SERPL-MCNC: 0.3 MG/DL (ref 0–1.1)
BILIRUB SERPL-MCNC: 0.3 MG/DL (ref 0.1–1)
HCT VFR BLD AUTO: 39.4 % (ref 37.5–51)
HGB BLD-MCNC: 12.1 G/DL (ref 13–17.7)
PROT SERPL-MCNC: 7.2 G/DL (ref 6.3–8.7)

## 2025-05-05 PROCEDURE — 85018 HEMOGLOBIN: CPT

## 2025-05-05 PROCEDURE — 36415 COLL VENOUS BLD VENIPUNCTURE: CPT

## 2025-05-05 PROCEDURE — 84403 ASSAY OF TOTAL TESTOSTERONE: CPT

## 2025-05-05 PROCEDURE — 84153 ASSAY OF PSA TOTAL: CPT

## 2025-05-05 PROCEDURE — 80076 HEPATIC FUNCTION PANEL: CPT

## 2025-05-05 PROCEDURE — 85014 HEMATOCRIT: CPT

## 2025-05-06 LAB
PSA SERPL-MCNC: 2.08 NG/ML (ref 0–4)
TESTOST SERPL-MCNC: 290 NG/DL (ref 193–740)

## 2025-05-12 ENCOUNTER — OFFICE VISIT (OUTPATIENT)
Dept: UROLOGY | Facility: CLINIC | Age: 70
End: 2025-05-12
Payer: COMMERCIAL

## 2025-05-12 VITALS — BODY MASS INDEX: 34.41 KG/M2 | HEIGHT: 70 IN | TEMPERATURE: 96.7 F | WEIGHT: 240.4 LBS

## 2025-05-12 DIAGNOSIS — N52.9 ERECTILE DYSFUNCTION, UNSPECIFIED ERECTILE DYSFUNCTION TYPE: ICD-10-CM

## 2025-05-12 DIAGNOSIS — R79.89 LOW TESTOSTERONE: Primary | ICD-10-CM

## 2025-05-12 PROCEDURE — 99214 OFFICE O/P EST MOD 30 MIN: CPT | Performed by: PHYSICIAN ASSISTANT

## 2025-05-13 DIAGNOSIS — R79.89 LOW TESTOSTERONE: ICD-10-CM

## 2025-05-13 RX ORDER — TESTOSTERONE 20.25 MG/1.25G
3 GEL TOPICAL 2 TIMES DAILY
Qty: 88 G | Refills: 5 | Status: SHIPPED | OUTPATIENT
Start: 2025-05-13

## 2025-06-17 DIAGNOSIS — J44.9 COPD, GROUP C, BY GOLD 2017 CLASSIFICATION: ICD-10-CM

## 2025-06-18 RX ORDER — BUDESONIDE, GLYCOPYRROLATE, AND FORMOTEROL FUMARATE 160; 9; 4.8 UG/1; UG/1; UG/1
2 AEROSOL, METERED RESPIRATORY (INHALATION) 2 TIMES DAILY
Qty: 10.7 G | Refills: 11 | Status: SHIPPED | OUTPATIENT
Start: 2025-06-18

## 2025-06-18 NOTE — TELEPHONE ENCOUNTER
Pharmacy requested refills on Breztri.    Rx Refill Note  Requested Prescriptions     Pending Prescriptions Disp Refills    Breztri Aerosphere 160-9-4.8 MCG/ACT aerosol inhaler [Pharmacy Med Name: BREZTRI AEROSPHERE 160MCG/ACT-4.8MCG/ACT-9MCG/ACT AEROSOL] 10.7 g 11     Sig: INHALE 2 PUFFS 2 (TWO) TIMES A DAY.      Last office visit with prescribing clinician: 2/27/2025   Last telemedicine visit with prescribing clinician: Visit date not found   Next office visit with prescribing clinician: 8/27/2025                         Would you like a call back once the refill request has been completed: [] Yes [] No    If the office needs to give you a call back, can they leave a voicemail: [] Yes [] No    Rabia Ledezma MA  06/18/25, 07:57 CDT

## 2025-06-25 ENCOUNTER — TELEPHONE (OUTPATIENT)
Dept: PULMONOLOGY | Facility: CLINIC | Age: 70
End: 2025-06-25
Payer: COMMERCIAL

## 2025-06-25 NOTE — TELEPHONE ENCOUNTER
Caller: Roc Rees    Relationship to Patient: Self    Phone Number:     [phone]        Reason for Call: THE PT WOULD LIKE TO SPEAK WITH SOMEONE ABOUT A BREATHING PROBLEM HE IS HAVING. PLEASE ADVISE    When was the patient last seen: 02/27/25

## 2025-06-25 NOTE — TELEPHONE ENCOUNTER
Spoke with patient. He is having worsening SOB. There are no other symptoms. He states this is mostly when he exerts himself. His oxygen readings when this is going on are 92-98%. He has not had any lower. He states he can walk a short distance and get SOB which is new for him. He is using his inhalers as prescribed.

## 2025-06-26 RX ORDER — AZITHROMYCIN 250 MG/1
TABLET, FILM COATED ORAL
Qty: 6 TABLET | Refills: 0 | Status: SHIPPED | OUTPATIENT
Start: 2025-06-26

## 2025-06-26 RX ORDER — PREDNISONE 20 MG/1
40 TABLET ORAL DAILY
Qty: 10 TABLET | Refills: 0 | Status: SHIPPED | OUTPATIENT
Start: 2025-06-26

## 2025-08-08 ENCOUNTER — TELEPHONE (OUTPATIENT)
Dept: UROLOGY | Facility: CLINIC | Age: 70
End: 2025-08-08
Payer: COMMERCIAL

## 2025-08-11 ENCOUNTER — LAB (OUTPATIENT)
Dept: LAB | Facility: HOSPITAL | Age: 70
End: 2025-08-11
Payer: MEDICARE

## 2025-08-11 DIAGNOSIS — R79.89 LOW TESTOSTERONE: ICD-10-CM

## 2025-08-11 PROCEDURE — 36415 COLL VENOUS BLD VENIPUNCTURE: CPT

## 2025-08-11 PROCEDURE — 84403 ASSAY OF TOTAL TESTOSTERONE: CPT

## 2025-08-12 LAB — TESTOST SERPL-MCNC: 675 NG/DL (ref 193–740)

## 2025-08-15 ENCOUNTER — TELEPHONE (OUTPATIENT)
Dept: UROLOGY | Facility: CLINIC | Age: 70
End: 2025-08-15
Payer: COMMERCIAL

## 2025-08-18 ENCOUNTER — LAB (OUTPATIENT)
Dept: LAB | Facility: HOSPITAL | Age: 70
End: 2025-08-18
Payer: COMMERCIAL

## 2025-08-18 ENCOUNTER — RESULTS FOLLOW-UP (OUTPATIENT)
Dept: UROLOGY | Facility: CLINIC | Age: 70
End: 2025-08-18
Payer: COMMERCIAL

## 2025-08-18 ENCOUNTER — OFFICE VISIT (OUTPATIENT)
Dept: UROLOGY | Facility: CLINIC | Age: 70
End: 2025-08-18
Payer: COMMERCIAL

## 2025-08-18 VITALS — WEIGHT: 241 LBS | HEIGHT: 70 IN | BODY MASS INDEX: 34.5 KG/M2 | TEMPERATURE: 97.8 F

## 2025-08-18 DIAGNOSIS — R79.89 LOW TESTOSTERONE: Primary | ICD-10-CM

## 2025-08-18 DIAGNOSIS — R79.89 LOW TESTOSTERONE: ICD-10-CM

## 2025-08-18 LAB
BILIRUB BLD-MCNC: NEGATIVE MG/DL
CLARITY, POC: CLEAR
COLOR UR: YELLOW
GLUCOSE UR STRIP-MCNC: NEGATIVE MG/DL
HCT VFR BLD AUTO: 42.1 % (ref 37.5–51)
HGB BLD-MCNC: 12.9 G/DL (ref 13–17.7)
KETONES UR QL: NEGATIVE
LEUKOCYTE EST, POC: NEGATIVE
NITRITE UR-MCNC: NEGATIVE MG/ML
PH UR: 5.5 [PH] (ref 5–8)
PROT UR STRIP-MCNC: ABNORMAL MG/DL
PSA SERPL-MCNC: 1.16 NG/ML (ref 0–4)
RBC # UR STRIP: NEGATIVE /UL
SP GR UR: 1.02 (ref 1–1.03)
TESTOST SERPL-MCNC: 376 NG/DL (ref 193–740)
UROBILINOGEN UR QL: ABNORMAL

## 2025-08-18 PROCEDURE — 81003 URINALYSIS AUTO W/O SCOPE: CPT | Performed by: PHYSICIAN ASSISTANT

## 2025-08-18 PROCEDURE — 85014 HEMATOCRIT: CPT

## 2025-08-18 PROCEDURE — 84153 ASSAY OF PSA TOTAL: CPT

## 2025-08-18 PROCEDURE — 85018 HEMOGLOBIN: CPT

## 2025-08-18 PROCEDURE — 84403 ASSAY OF TOTAL TESTOSTERONE: CPT

## 2025-08-18 PROCEDURE — 99214 OFFICE O/P EST MOD 30 MIN: CPT | Performed by: PHYSICIAN ASSISTANT

## 2025-08-18 PROCEDURE — 36415 COLL VENOUS BLD VENIPUNCTURE: CPT

## 2025-08-18 RX ORDER — LOSARTAN POTASSIUM AND HYDROCHLOROTHIAZIDE 25; 100 MG/1; MG/1
1 TABLET ORAL DAILY
COMMUNITY
Start: 2025-05-30

## 2025-08-18 RX ORDER — ENSIFENTRINE 3 MG/2.5ML
SUSPENSION RESPIRATORY (INHALATION)
COMMUNITY
Start: 2025-08-02 | End: 2025-08-20 | Stop reason: SDUPTHER

## 2025-08-20 DIAGNOSIS — J44.9 COPD, GROUP C, BY GOLD 2017 CLASSIFICATION: Primary | ICD-10-CM

## 2025-08-20 RX ORDER — ENSIFENTRINE 3 MG/2.5ML
3 SUSPENSION RESPIRATORY (INHALATION)
Qty: 150 ML | Refills: 11 | Status: SHIPPED | OUTPATIENT
Start: 2025-08-20

## 2025-08-27 ENCOUNTER — OFFICE VISIT (OUTPATIENT)
Dept: PULMONOLOGY | Facility: CLINIC | Age: 70
End: 2025-08-27
Payer: COMMERCIAL

## 2025-08-27 VITALS
HEIGHT: 70 IN | WEIGHT: 240 LBS | HEART RATE: 78 BPM | OXYGEN SATURATION: 94 % | BODY MASS INDEX: 34.36 KG/M2 | SYSTOLIC BLOOD PRESSURE: 128 MMHG | DIASTOLIC BLOOD PRESSURE: 82 MMHG

## 2025-08-27 DIAGNOSIS — E66.01 CLASS 2 SEVERE OBESITY DUE TO EXCESS CALORIES WITH SERIOUS COMORBIDITY AND BODY MASS INDEX (BMI) OF 35.0 TO 35.9 IN ADULT: Chronic | ICD-10-CM

## 2025-08-27 DIAGNOSIS — Z91.09 ENVIRONMENTAL ALLERGIES: Chronic | ICD-10-CM

## 2025-08-27 DIAGNOSIS — R53.83 OTHER FATIGUE: ICD-10-CM

## 2025-08-27 DIAGNOSIS — J44.9 STAGE 3 SEVERE COPD BY GOLD CLASSIFICATION: Chronic | ICD-10-CM

## 2025-08-27 DIAGNOSIS — J98.4 SCARRING OF LUNG: Primary | Chronic | ICD-10-CM

## 2025-08-27 DIAGNOSIS — Z87.891 PERSONAL HISTORY OF NICOTINE DEPENDENCE: Chronic | ICD-10-CM

## 2025-08-27 DIAGNOSIS — E66.812 CLASS 2 SEVERE OBESITY DUE TO EXCESS CALORIES WITH SERIOUS COMORBIDITY AND BODY MASS INDEX (BMI) OF 35.0 TO 35.9 IN ADULT: Chronic | ICD-10-CM

## 2025-08-27 DIAGNOSIS — G47.33 OBSTRUCTIVE SLEEP APNEA: Chronic | ICD-10-CM

## 2025-08-27 DIAGNOSIS — Z57.2 OCCUPATIONAL EXPOSURE TO COAL DUST: Chronic | ICD-10-CM

## 2025-08-27 PROCEDURE — 99214 OFFICE O/P EST MOD 30 MIN: CPT | Performed by: NURSE PRACTITIONER

## 2025-08-27 RX ORDER — ALBUTEROL SULFATE 0.83 MG/ML
2.5 SOLUTION RESPIRATORY (INHALATION) 4 TIMES DAILY PRN
Qty: 360 ML | Refills: 5 | Status: SHIPPED | OUTPATIENT
Start: 2025-08-27

## 2025-08-27 SDOH — HEALTH STABILITY - PHYSICAL HEALTH: OCCUPATIONAL EXPOSURE TO DUST: Z57.2

## 2025-08-28 ENCOUNTER — RESULTS FOLLOW-UP (OUTPATIENT)
Dept: PULMONOLOGY | Facility: CLINIC | Age: 70
End: 2025-08-28
Payer: COMMERCIAL

## 2025-08-28 DIAGNOSIS — J44.9 STAGE 3 SEVERE COPD BY GOLD CLASSIFICATION: Primary | ICD-10-CM

## 2025-08-28 LAB
BASOPHILS # BLD AUTO: 0.1 X10E3/UL (ref 0–0.2)
BASOPHILS NFR BLD AUTO: 2 %
EOSINOPHIL # BLD AUTO: 0.4 X10E3/UL (ref 0–0.4)
EOSINOPHIL NFR BLD AUTO: 5 %
ERYTHROCYTE [DISTWIDTH] IN BLOOD BY AUTOMATED COUNT: 14.9 % (ref 11.6–15.4)
HCT VFR BLD AUTO: 41.4 % (ref 37.5–51)
HGB BLD-MCNC: 12.6 G/DL (ref 13–17.7)
IMM GRANULOCYTES # BLD AUTO: 0 X10E3/UL (ref 0–0.1)
IMM GRANULOCYTES NFR BLD AUTO: 0 %
LYMPHOCYTES # BLD AUTO: 2.1 X10E3/UL (ref 0.7–3.1)
LYMPHOCYTES NFR BLD AUTO: 26 %
MCH RBC QN AUTO: 23.3 PG (ref 26.6–33)
MCHC RBC AUTO-ENTMCNC: 30.4 G/DL (ref 31.5–35.7)
MCV RBC AUTO: 77 FL (ref 79–97)
MONOCYTES # BLD AUTO: 0.9 X10E3/UL (ref 0.1–0.9)
MONOCYTES NFR BLD AUTO: 11 %
NEUTROPHILS # BLD AUTO: 4.4 X10E3/UL (ref 1.4–7)
NEUTROPHILS NFR BLD AUTO: 56 %
PLATELET # BLD AUTO: 450 X10E3/UL (ref 150–450)
RBC # BLD AUTO: 5.4 X10E6/UL (ref 4.14–5.8)
WBC # BLD AUTO: 7.8 X10E3/UL (ref 3.4–10.8)

## (undated) DEVICE — SPNG GZ STRL 2S 4X4 12PLY

## (undated) DEVICE — 3M™ STERI-DRAPE™ U-DRAPE 1015: Brand: STERI-DRAPE™

## (undated) DEVICE — APPL CHLORAPREP HI/LITE 26ML ORNG

## (undated) DEVICE — 3M™ IOBAN™ 2 ANTIMICROBIAL INCISE DRAPE 6651EZ: Brand: IOBAN™ 2

## (undated) DEVICE — GOWN,NON-REINFORCED,SIRUS,SET IN SLV,XL: Brand: MEDLINE

## (undated) DEVICE — SHEATH INTRO MICRO 7F 11CM

## (undated) DEVICE — GLV SURG SENSICARE W/ALOE PF LF 7.5 STRL

## (undated) DEVICE — SKIN AFFIX SURG ADHESIVE 72/CS 0.55ML: Brand: MEDLINE

## (undated) DEVICE — BNDG ADHS CURAD FLX/FABRC 1X3IN STRL LF

## (undated) DEVICE — GLV SURG BIOGEL LTX PF 8

## (undated) DEVICE — PK TURNOVER CYSTO RM

## (undated) DEVICE — BALLOON DILATATION CATHETER KIT: Brand: UROMAX ULTRA KIT

## (undated) DEVICE — SUT ETHIB 5 V37 30IN MB66G

## (undated) DEVICE — PK CYSTO 30

## (undated) DEVICE — SNARE POLYP SM W13MMXL240CM SHTH DIA2.4MM OVL FLX DISP

## (undated) DEVICE — BNDG ELAS W/CLIP 6IN 10YD LF STRL

## (undated) DEVICE — OPTIFOAM GENTLE SA, POSTOP, 4X8: Brand: MEDLINE

## (undated) DEVICE — ST PIN FIX TEMP UNIVERS REVERS W/OSTEO/GUIDE/PIN 2.4MM STRL

## (undated) DEVICE — GLV SURG TRIUMPH MICRO PF LTX 7.5 STRL

## (undated) DEVICE — SYR LL TP 10ML STRL

## (undated) DEVICE — PK SHLDR 30

## (undated) DEVICE — CVR UNIV C/ARM

## (undated) DEVICE — FIBR LASR FLEXIVA 365 HIPOWR 1P/U

## (undated) DEVICE — HANDPIECE SET WITH HIGH FLOW TIP AND SUCTION TUBE: Brand: INTERPULSE

## (undated) DEVICE — ANTIBACTERIAL UNDYED BRAIDED (POLYGLACTIN 910), SYNTHETIC ABSORBABLE SUTURE: Brand: COATED VICRYL

## (undated) DEVICE — BNDG GZ SOF-FORM CONFRM 2X75IN LF STRL

## (undated) DEVICE — GLV SURG TRIUMPH GREEN W/ALOE PF LTX 8 STRL

## (undated) DEVICE — CLTH CLENS READYCLEANSE PERI CARE PK/5

## (undated) DEVICE — BNDG ELAS ECON W/CLIP 4IN 5YD LF STRL

## (undated) DEVICE — GW SENSR DUALFLEX NITNL STR .038IN 3X150CM

## (undated) DEVICE — PAD MINOR UNIVERSAL: Brand: MEDLINE INDUSTRIES, INC.

## (undated) DEVICE — TBG DRN URINARY 9/32IN

## (undated) DEVICE — CONN FLX BREATHE CIRCT

## (undated) DEVICE — PK TURNOVER RM ADV

## (undated) DEVICE — BLD SAW ACRION AGGR 1.27X19X90MM STRL

## (undated) DEVICE — 1010 S-DRAPE TOWEL DRAPE 10/BX: Brand: STERI-DRAPE™

## (undated) DEVICE — 3M™ STERI-STRIP™ REINFORCED ADHESIVE SKIN CLOSURES, R1547, 1/2 IN X 4 IN (12 MM X 100 MM), 6 STRIPS/ENVELOPE: Brand: 3M™ STERI-STRIP™

## (undated) DEVICE — ENDOSCOPIC SEAL URO 1 SIZE FITS ALL: Brand: ENDOSCOPIC SEAL

## (undated) DEVICE — BIPOLAR SEALER 23-112-1 AQM 6.0: Brand: AQUAMANTYS™

## (undated) DEVICE — STERILE ULTRASOUND GEL, SAFEWRAP: Brand: ECOVUE

## (undated) DEVICE — INTENDED FOR TISSUE SEPARATION, AND OTHER PROCEDURES THAT REQUIRE A SHARP SURGICAL BLADE TO PUNCTURE OR CUT.: Brand: BARD-PARKER ® STAINLESS STEEL BLADES

## (undated) DEVICE — ENDO KIT,LOURDES HOSPITAL: Brand: MEDLINE INDUSTRIES, INC.

## (undated) DEVICE — CVR PROB GEN PURP W ISOSILK 6X48

## (undated) DEVICE — NITINOL STONE RETRIEVAL BASKET: Brand: ZERO TIP